# Patient Record
Sex: FEMALE | Race: WHITE | NOT HISPANIC OR LATINO | Employment: OTHER | URBAN - METROPOLITAN AREA
[De-identification: names, ages, dates, MRNs, and addresses within clinical notes are randomized per-mention and may not be internally consistent; named-entity substitution may affect disease eponyms.]

---

## 2017-06-20 ENCOUNTER — OFFICE VISIT - HEALTHEAST (OUTPATIENT)
Dept: FAMILY MEDICINE | Facility: CLINIC | Age: 67
End: 2017-06-20

## 2017-06-20 DIAGNOSIS — K21.9 GASTROESOPHAGEAL REFLUX DISEASE, ESOPHAGITIS PRESENCE NOT SPECIFIED: ICD-10-CM

## 2017-06-20 ASSESSMENT — MIFFLIN-ST. JEOR: SCORE: 1498.1

## 2017-07-10 ENCOUNTER — COMMUNICATION - HEALTHEAST (OUTPATIENT)
Dept: FAMILY MEDICINE | Facility: CLINIC | Age: 67
End: 2017-07-10

## 2017-08-20 ENCOUNTER — RECORDS - HEALTHEAST (OUTPATIENT)
Dept: ADMINISTRATIVE | Facility: OTHER | Age: 67
End: 2017-08-20

## 2017-09-07 ENCOUNTER — RECORDS - HEALTHEAST (OUTPATIENT)
Dept: ADMINISTRATIVE | Facility: OTHER | Age: 67
End: 2017-09-07

## 2017-10-11 ENCOUNTER — AMBULATORY - HEALTHEAST (OUTPATIENT)
Dept: NURSING | Facility: CLINIC | Age: 67
End: 2017-10-11

## 2017-10-11 ENCOUNTER — RECORDS - HEALTHEAST (OUTPATIENT)
Dept: ADMINISTRATIVE | Facility: OTHER | Age: 67
End: 2017-10-11

## 2017-10-11 DIAGNOSIS — Z23 IMMUNIZATION DUE: ICD-10-CM

## 2017-10-22 ENCOUNTER — COMMUNICATION - HEALTHEAST (OUTPATIENT)
Dept: FAMILY MEDICINE | Facility: CLINIC | Age: 67
End: 2017-10-22

## 2017-10-22 DIAGNOSIS — K21.9 GASTROESOPHAGEAL REFLUX DISEASE, ESOPHAGITIS PRESENCE NOT SPECIFIED: ICD-10-CM

## 2017-11-01 ENCOUNTER — OFFICE VISIT - HEALTHEAST (OUTPATIENT)
Dept: FAMILY MEDICINE | Facility: CLINIC | Age: 67
End: 2017-11-01

## 2017-11-01 DIAGNOSIS — J01.90 ACUTE SINUSITIS: ICD-10-CM

## 2017-11-01 DIAGNOSIS — H10.33 ACUTE CONJUNCTIVITIS, BILATERAL: ICD-10-CM

## 2017-11-20 ENCOUNTER — COMMUNICATION - HEALTHEAST (OUTPATIENT)
Dept: FAMILY MEDICINE | Facility: CLINIC | Age: 67
End: 2017-11-20

## 2017-11-20 ENCOUNTER — OFFICE VISIT - HEALTHEAST (OUTPATIENT)
Dept: FAMILY MEDICINE | Facility: CLINIC | Age: 67
End: 2017-11-20

## 2017-11-20 DIAGNOSIS — Z12.11 SCREEN FOR COLON CANCER: ICD-10-CM

## 2017-11-20 DIAGNOSIS — G47.30 SLEEP APNEA: ICD-10-CM

## 2017-11-20 DIAGNOSIS — J32.9 SINUSITIS: ICD-10-CM

## 2017-11-20 ASSESSMENT — MIFFLIN-ST. JEOR: SCORE: 1510.57

## 2017-12-21 ENCOUNTER — COMMUNICATION - HEALTHEAST (OUTPATIENT)
Dept: FAMILY MEDICINE | Facility: CLINIC | Age: 67
End: 2017-12-21

## 2017-12-21 DIAGNOSIS — K21.9 GASTROESOPHAGEAL REFLUX DISEASE, ESOPHAGITIS PRESENCE NOT SPECIFIED: ICD-10-CM

## 2018-01-11 ENCOUNTER — OFFICE VISIT - HEALTHEAST (OUTPATIENT)
Dept: SLEEP MEDICINE | Facility: CLINIC | Age: 68
End: 2018-01-11

## 2018-01-11 DIAGNOSIS — E66.9 OBESITY: ICD-10-CM

## 2018-01-11 DIAGNOSIS — R06.83 SNORING: ICD-10-CM

## 2018-01-11 DIAGNOSIS — G47.8 SLEEP DYSFUNCTION WITH SLEEP STAGE DISTURBANCE: ICD-10-CM

## 2018-01-11 DIAGNOSIS — G47.10 HYPERSOMNIA: ICD-10-CM

## 2018-01-11 ASSESSMENT — MIFFLIN-ST. JEOR: SCORE: 1503.83

## 2018-01-15 ENCOUNTER — RECORDS - HEALTHEAST (OUTPATIENT)
Dept: SLEEP MEDICINE | Facility: CLINIC | Age: 68
End: 2018-01-15

## 2018-01-15 ENCOUNTER — RECORDS - HEALTHEAST (OUTPATIENT)
Dept: ADMINISTRATIVE | Facility: OTHER | Age: 68
End: 2018-01-15

## 2018-01-15 DIAGNOSIS — R06.83 SNORING: ICD-10-CM

## 2018-01-15 DIAGNOSIS — G47.10 HYPERSOMNIA, UNSPECIFIED: ICD-10-CM

## 2018-01-15 DIAGNOSIS — E66.9 OBESITY, UNSPECIFIED: ICD-10-CM

## 2018-01-15 DIAGNOSIS — G47.8 OTHER SLEEP DISORDERS: ICD-10-CM

## 2018-01-16 ENCOUNTER — COMMUNICATION - HEALTHEAST (OUTPATIENT)
Dept: SLEEP MEDICINE | Facility: CLINIC | Age: 68
End: 2018-01-16

## 2018-01-16 DIAGNOSIS — G47.33 OBSTRUCTIVE SLEEP APNEA: ICD-10-CM

## 2018-01-16 DIAGNOSIS — G47.10 HYPERSOMNIA: ICD-10-CM

## 2018-01-17 ENCOUNTER — AMBULATORY - HEALTHEAST (OUTPATIENT)
Dept: SLEEP MEDICINE | Facility: CLINIC | Age: 68
End: 2018-01-17

## 2018-01-25 ENCOUNTER — AMBULATORY - HEALTHEAST (OUTPATIENT)
Dept: SLEEP MEDICINE | Facility: CLINIC | Age: 68
End: 2018-01-25

## 2018-03-26 ENCOUNTER — COMMUNICATION - HEALTHEAST (OUTPATIENT)
Dept: FAMILY MEDICINE | Facility: CLINIC | Age: 68
End: 2018-03-26

## 2018-03-26 DIAGNOSIS — K21.9 GASTROESOPHAGEAL REFLUX DISEASE, ESOPHAGITIS PRESENCE NOT SPECIFIED: ICD-10-CM

## 2018-04-09 ENCOUNTER — OFFICE VISIT - HEALTHEAST (OUTPATIENT)
Dept: SLEEP MEDICINE | Facility: CLINIC | Age: 68
End: 2018-04-09

## 2018-04-09 DIAGNOSIS — G47.33 OSA ON CPAP: ICD-10-CM

## 2018-04-09 DIAGNOSIS — G47.8 SLEEP DYSFUNCTION WITH SLEEP STAGE DISTURBANCE: ICD-10-CM

## 2018-04-09 ASSESSMENT — MIFFLIN-ST. JEOR: SCORE: 1500.2

## 2018-04-11 ENCOUNTER — DOCUMENTATION ONLY (OUTPATIENT)
Dept: SLEEP MEDICINE | Facility: CLINIC | Age: 68
End: 2018-04-11
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
## 2018-05-08 ENCOUNTER — COMMUNICATION - HEALTHEAST (OUTPATIENT)
Dept: FAMILY MEDICINE | Facility: CLINIC | Age: 68
End: 2018-05-08

## 2018-05-08 DIAGNOSIS — K21.9 GASTROESOPHAGEAL REFLUX DISEASE, ESOPHAGITIS PRESENCE NOT SPECIFIED: ICD-10-CM

## 2018-10-10 ENCOUNTER — AMBULATORY - HEALTHEAST (OUTPATIENT)
Dept: NURSING | Facility: CLINIC | Age: 68
End: 2018-10-10

## 2018-10-10 DIAGNOSIS — Z23 FLU VACCINE NEED: ICD-10-CM

## 2019-01-16 ENCOUNTER — COMMUNICATION - HEALTHEAST (OUTPATIENT)
Dept: FAMILY MEDICINE | Facility: CLINIC | Age: 69
End: 2019-01-16

## 2019-01-16 DIAGNOSIS — K21.9 GASTROESOPHAGEAL REFLUX DISEASE, ESOPHAGITIS PRESENCE NOT SPECIFIED: ICD-10-CM

## 2019-02-21 ENCOUNTER — OFFICE VISIT - HEALTHEAST (OUTPATIENT)
Dept: FAMILY MEDICINE | Facility: CLINIC | Age: 69
End: 2019-02-21

## 2019-02-21 DIAGNOSIS — M54.9 BACK PAIN: ICD-10-CM

## 2019-02-21 DIAGNOSIS — Z12.11 COLON CANCER SCREENING: ICD-10-CM

## 2019-02-21 DIAGNOSIS — Z12.31 VISIT FOR SCREENING MAMMOGRAM: ICD-10-CM

## 2019-02-21 LAB
ALBUMIN UR-MCNC: NEGATIVE MG/DL
APPEARANCE UR: CLEAR
BILIRUB UR QL STRIP: NEGATIVE
COLOR UR AUTO: YELLOW
GLUCOSE UR STRIP-MCNC: NEGATIVE MG/DL
HGB UR QL STRIP: NEGATIVE
KETONES UR STRIP-MCNC: NEGATIVE MG/DL
LEUKOCYTE ESTERASE UR QL STRIP: NEGATIVE
NITRATE UR QL: NEGATIVE
PH UR STRIP: 7 [PH] (ref 5–8)
SP GR UR STRIP: 1.02 (ref 1–1.03)
UROBILINOGEN UR STRIP-ACNC: NORMAL

## 2019-02-21 ASSESSMENT — MIFFLIN-ST. JEOR: SCORE: 1530.14

## 2019-02-22 ENCOUNTER — COMMUNICATION - HEALTHEAST (OUTPATIENT)
Dept: FAMILY MEDICINE | Facility: CLINIC | Age: 69
End: 2019-02-22

## 2019-02-22 DIAGNOSIS — K21.9 GASTROESOPHAGEAL REFLUX DISEASE, ESOPHAGITIS PRESENCE NOT SPECIFIED: ICD-10-CM

## 2019-03-07 ENCOUNTER — HOSPITAL ENCOUNTER (OUTPATIENT)
Dept: MAMMOGRAPHY | Facility: CLINIC | Age: 69
Discharge: HOME | End: 2019-03-07
Attending: FAMILY MEDICINE

## 2019-03-07 DIAGNOSIS — Z12.31 VISIT FOR SCREENING MAMMOGRAM: ICD-10-CM

## 2019-04-15 ENCOUNTER — COMMUNICATION - HEALTHEAST (OUTPATIENT)
Dept: FAMILY MEDICINE | Facility: CLINIC | Age: 69
End: 2019-04-15

## 2019-04-18 ENCOUNTER — OFFICE VISIT - HEALTHEAST (OUTPATIENT)
Dept: SLEEP MEDICINE | Facility: CLINIC | Age: 69
End: 2019-04-18

## 2019-04-18 DIAGNOSIS — G47.33 OBSTRUCTIVE SLEEP APNEA: ICD-10-CM

## 2019-04-18 DIAGNOSIS — G47.8 SLEEP DYSFUNCTION WITH SLEEP STAGE DISTURBANCE: ICD-10-CM

## 2019-04-18 ASSESSMENT — MIFFLIN-ST. JEOR: SCORE: 1548.73

## 2019-06-26 ENCOUNTER — OFFICE VISIT - HEALTHEAST (OUTPATIENT)
Dept: FAMILY MEDICINE | Facility: CLINIC | Age: 69
End: 2019-06-26

## 2019-06-26 DIAGNOSIS — M79.661 PAIN OF RIGHT LOWER LEG: ICD-10-CM

## 2019-06-26 ASSESSMENT — MIFFLIN-ST. JEOR: SCORE: 1556.22

## 2019-07-03 ENCOUNTER — COMMUNICATION - HEALTHEAST (OUTPATIENT)
Dept: FAMILY MEDICINE | Facility: CLINIC | Age: 69
End: 2019-07-03

## 2019-07-03 DIAGNOSIS — M54.50 CHRONIC RIGHT-SIDED LOW BACK PAIN WITHOUT SCIATICA: ICD-10-CM

## 2019-07-03 DIAGNOSIS — G89.29 CHRONIC RIGHT-SIDED LOW BACK PAIN WITHOUT SCIATICA: ICD-10-CM

## 2019-07-03 DIAGNOSIS — M79.661 PAIN OF RIGHT LOWER LEG: ICD-10-CM

## 2019-07-23 ENCOUNTER — HOSPITAL ENCOUNTER (OUTPATIENT)
Dept: PHYSICAL MEDICINE AND REHAB | Facility: CLINIC | Age: 69
Discharge: HOME | End: 2019-07-23
Attending: FAMILY MEDICINE

## 2019-07-23 DIAGNOSIS — M54.16 LUMBAR RADICULAR PAIN: ICD-10-CM

## 2019-07-23 DIAGNOSIS — F40.240 CLAUSTROPHOBIA: ICD-10-CM

## 2019-07-23 DIAGNOSIS — M47.816 LUMBAR FACET ARTHROPATHY: ICD-10-CM

## 2019-07-23 DIAGNOSIS — M43.16 SPONDYLOLISTHESIS OF LUMBAR REGION: ICD-10-CM

## 2019-07-30 ENCOUNTER — COMMUNICATION - HEALTHEAST (OUTPATIENT)
Dept: PHYSICAL MEDICINE AND REHAB | Facility: CLINIC | Age: 69
End: 2019-07-30

## 2019-07-31 ENCOUNTER — HOSPITAL ENCOUNTER (OUTPATIENT)
Dept: MRI IMAGING | Facility: HOSPITAL | Age: 69
Discharge: HOME | End: 2019-07-31
Attending: PHYSICIAN ASSISTANT

## 2019-07-31 DIAGNOSIS — M47.816 LUMBAR FACET ARTHROPATHY: ICD-10-CM

## 2019-07-31 DIAGNOSIS — M43.16 SPONDYLOLISTHESIS OF LUMBAR REGION: ICD-10-CM

## 2019-07-31 DIAGNOSIS — M54.16 LUMBAR RADICULAR PAIN: ICD-10-CM

## 2019-08-01 ENCOUNTER — COMMUNICATION - HEALTHEAST (OUTPATIENT)
Dept: PHYSICAL MEDICINE AND REHAB | Facility: CLINIC | Age: 69
End: 2019-08-01

## 2019-08-05 ENCOUNTER — OFFICE VISIT - HEALTHEAST (OUTPATIENT)
Dept: PHYSICAL THERAPY | Facility: REHABILITATION | Age: 69
End: 2019-08-05

## 2019-08-05 DIAGNOSIS — M54.16 RIGHT LUMBAR RADICULOPATHY: ICD-10-CM

## 2019-08-05 DIAGNOSIS — M62.81 GENERALIZED MUSCLE WEAKNESS: ICD-10-CM

## 2019-08-05 DIAGNOSIS — M43.16 SPONDYLOLISTHESIS OF LUMBAR REGION: ICD-10-CM

## 2019-08-14 ENCOUNTER — OFFICE VISIT - HEALTHEAST (OUTPATIENT)
Dept: PHYSICAL THERAPY | Facility: REHABILITATION | Age: 69
End: 2019-08-14

## 2019-08-14 DIAGNOSIS — M62.81 GENERALIZED MUSCLE WEAKNESS: ICD-10-CM

## 2019-08-14 DIAGNOSIS — M54.16 RIGHT LUMBAR RADICULOPATHY: ICD-10-CM

## 2019-08-14 DIAGNOSIS — M43.16 SPONDYLOLISTHESIS OF LUMBAR REGION: ICD-10-CM

## 2019-08-15 ENCOUNTER — COMMUNICATION - HEALTHEAST (OUTPATIENT)
Dept: PHYSICAL MEDICINE AND REHAB | Facility: CLINIC | Age: 69
End: 2019-08-15

## 2019-08-15 DIAGNOSIS — M54.16 LUMBAR RADICULAR PAIN: ICD-10-CM

## 2019-08-21 ENCOUNTER — OFFICE VISIT - HEALTHEAST (OUTPATIENT)
Dept: PHYSICAL THERAPY | Facility: REHABILITATION | Age: 69
End: 2019-08-21

## 2019-08-21 DIAGNOSIS — M54.16 RIGHT LUMBAR RADICULOPATHY: ICD-10-CM

## 2019-08-21 DIAGNOSIS — M62.81 GENERALIZED MUSCLE WEAKNESS: ICD-10-CM

## 2019-08-21 DIAGNOSIS — M43.16 SPONDYLOLISTHESIS OF LUMBAR REGION: ICD-10-CM

## 2019-09-04 ENCOUNTER — OFFICE VISIT - HEALTHEAST (OUTPATIENT)
Dept: PHYSICAL THERAPY | Facility: REHABILITATION | Age: 69
End: 2019-09-04

## 2019-09-04 DIAGNOSIS — M62.81 GENERALIZED MUSCLE WEAKNESS: ICD-10-CM

## 2019-09-04 DIAGNOSIS — M54.16 RIGHT LUMBAR RADICULOPATHY: ICD-10-CM

## 2019-09-04 DIAGNOSIS — M43.16 SPONDYLOLISTHESIS OF LUMBAR REGION: ICD-10-CM

## 2019-09-09 ENCOUNTER — HOSPITAL ENCOUNTER (OUTPATIENT)
Dept: PHYSICAL MEDICINE AND REHAB | Facility: CLINIC | Age: 69
Discharge: HOME | End: 2019-09-09
Attending: PHYSICIAN ASSISTANT

## 2019-09-09 DIAGNOSIS — M54.16 LUMBAR RADICULAR PAIN: ICD-10-CM

## 2019-09-09 DIAGNOSIS — M47.816 LUMBAR FACET ARTHROPATHY: ICD-10-CM

## 2019-09-09 DIAGNOSIS — M43.16 SPONDYLOLISTHESIS OF LUMBAR REGION: ICD-10-CM

## 2019-09-09 DIAGNOSIS — M16.12 PRIMARY OSTEOARTHRITIS OF LEFT HIP: ICD-10-CM

## 2019-10-02 ENCOUNTER — RECORDS - HEALTHEAST (OUTPATIENT)
Dept: ADMINISTRATIVE | Facility: OTHER | Age: 69
End: 2019-10-02

## 2019-10-03 ENCOUNTER — AMBULATORY - HEALTHEAST (OUTPATIENT)
Dept: NURSING | Facility: CLINIC | Age: 69
End: 2019-10-03

## 2019-10-03 DIAGNOSIS — Z23 FLU VACCINE NEED: ICD-10-CM

## 2019-10-16 ENCOUNTER — COMMUNICATION - HEALTHEAST (OUTPATIENT)
Dept: PHYSICAL MEDICINE AND REHAB | Facility: CLINIC | Age: 69
End: 2019-10-16

## 2019-10-17 ENCOUNTER — HOSPITAL ENCOUNTER (OUTPATIENT)
Dept: PHYSICAL MEDICINE AND REHAB | Facility: CLINIC | Age: 69
Discharge: HOME | End: 2019-10-17
Attending: PHYSICIAN ASSISTANT

## 2019-10-17 DIAGNOSIS — M25.552 HIP PAIN, LEFT: ICD-10-CM

## 2019-10-17 DIAGNOSIS — M43.16 SPONDYLOLISTHESIS OF LUMBAR REGION: ICD-10-CM

## 2019-10-17 DIAGNOSIS — M54.16 LUMBAR RADICULAR PAIN: ICD-10-CM

## 2019-10-17 ASSESSMENT — MIFFLIN-ST. JEOR: SCORE: 1550.55

## 2019-10-21 ENCOUNTER — COMMUNICATION - HEALTHEAST (OUTPATIENT)
Dept: PHYSICAL MEDICINE AND REHAB | Facility: CLINIC | Age: 69
End: 2019-10-21

## 2019-10-21 ENCOUNTER — RECORDS - HEALTHEAST (OUTPATIENT)
Dept: GENERAL RADIOLOGY | Facility: CLINIC | Age: 69
End: 2019-10-21

## 2019-10-21 DIAGNOSIS — M25.552 PAIN IN LEFT HIP: ICD-10-CM

## 2019-10-31 ENCOUNTER — HOSPITAL ENCOUNTER (OUTPATIENT)
Dept: PHYSICAL MEDICINE AND REHAB | Facility: CLINIC | Age: 69
Discharge: HOME | End: 2019-10-31
Attending: PHYSICIAN ASSISTANT

## 2019-10-31 DIAGNOSIS — M16.12 PRIMARY OSTEOARTHRITIS OF LEFT HIP: ICD-10-CM

## 2019-10-31 ASSESSMENT — MIFFLIN-ST. JEOR: SCORE: 1550.55

## 2019-11-01 ENCOUNTER — OFFICE VISIT - HEALTHEAST (OUTPATIENT)
Dept: PHYSICAL THERAPY | Facility: REHABILITATION | Age: 69
End: 2019-11-01

## 2019-11-01 DIAGNOSIS — M62.81 MUSCLE WEAKNESS (GENERALIZED): ICD-10-CM

## 2019-11-01 DIAGNOSIS — M25.552 ACUTE HIP PAIN, LEFT: ICD-10-CM

## 2019-11-05 ENCOUNTER — OFFICE VISIT - HEALTHEAST (OUTPATIENT)
Dept: PHYSICAL THERAPY | Facility: REHABILITATION | Age: 69
End: 2019-11-05

## 2019-11-05 DIAGNOSIS — M25.552 ACUTE HIP PAIN, LEFT: ICD-10-CM

## 2019-11-05 DIAGNOSIS — M62.81 MUSCLE WEAKNESS (GENERALIZED): ICD-10-CM

## 2019-11-11 ENCOUNTER — OFFICE VISIT - HEALTHEAST (OUTPATIENT)
Dept: FAMILY MEDICINE | Facility: CLINIC | Age: 69
End: 2019-11-11

## 2019-11-11 DIAGNOSIS — K21.9 GASTROESOPHAGEAL REFLUX DISEASE WITHOUT ESOPHAGITIS: ICD-10-CM

## 2019-11-11 DIAGNOSIS — Z23 IMMUNIZATION DUE: ICD-10-CM

## 2019-11-11 DIAGNOSIS — Z01.818 PREOPERATIVE EXAMINATION: ICD-10-CM

## 2019-11-11 DIAGNOSIS — I10 BENIGN ESSENTIAL HYPERTENSION: ICD-10-CM

## 2019-11-11 DIAGNOSIS — H25.9 AGE-RELATED CATARACT OF BOTH EYES, UNSPECIFIED AGE-RELATED CATARACT TYPE: ICD-10-CM

## 2019-11-11 LAB
ANION GAP SERPL CALCULATED.3IONS-SCNC: 10 MMOL/L (ref 5–18)
BUN SERPL-MCNC: 12 MG/DL (ref 8–22)
CALCIUM SERPL-MCNC: 8.7 MG/DL (ref 8.5–10.5)
CHLORIDE BLD-SCNC: 105 MMOL/L (ref 98–107)
CO2 SERPL-SCNC: 28 MMOL/L (ref 22–31)
CREAT SERPL-MCNC: 0.81 MG/DL (ref 0.6–1.1)
GFR SERPL CREATININE-BSD FRML MDRD: >60 ML/MIN/1.73M2
GLUCOSE BLD-MCNC: 120 MG/DL (ref 70–125)
POTASSIUM BLD-SCNC: 4.1 MMOL/L (ref 3.5–5)
SODIUM SERPL-SCNC: 143 MMOL/L (ref 136–145)

## 2019-11-12 ENCOUNTER — OFFICE VISIT - HEALTHEAST (OUTPATIENT)
Dept: PHYSICAL THERAPY | Facility: REHABILITATION | Age: 69
End: 2019-11-12

## 2019-11-12 DIAGNOSIS — M25.552 ACUTE HIP PAIN, LEFT: ICD-10-CM

## 2019-11-12 DIAGNOSIS — M62.81 MUSCLE WEAKNESS (GENERALIZED): ICD-10-CM

## 2019-11-14 ENCOUNTER — OFFICE VISIT - HEALTHEAST (OUTPATIENT)
Dept: PHYSICAL THERAPY | Facility: REHABILITATION | Age: 69
End: 2019-11-14

## 2019-11-14 DIAGNOSIS — M25.552 ACUTE HIP PAIN, LEFT: ICD-10-CM

## 2019-11-14 DIAGNOSIS — M62.81 MUSCLE WEAKNESS (GENERALIZED): ICD-10-CM

## 2019-12-19 ENCOUNTER — RECORDS - HEALTHEAST (OUTPATIENT)
Dept: ADMINISTRATIVE | Facility: OTHER | Age: 69
End: 2019-12-19

## 2020-01-02 ENCOUNTER — RECORDS - HEALTHEAST (OUTPATIENT)
Dept: ADMINISTRATIVE | Facility: OTHER | Age: 70
End: 2020-01-02

## 2020-01-29 ENCOUNTER — COMMUNICATION - HEALTHEAST (OUTPATIENT)
Dept: FAMILY MEDICINE | Facility: CLINIC | Age: 70
End: 2020-01-29

## 2020-03-06 ENCOUNTER — OFFICE VISIT - HEALTHEAST (OUTPATIENT)
Dept: FAMILY MEDICINE | Facility: CLINIC | Age: 70
End: 2020-03-06

## 2020-03-06 DIAGNOSIS — H01.001 BLEPHARITIS OF UPPER EYELIDS OF BOTH EYES, UNSPECIFIED TYPE: ICD-10-CM

## 2020-03-06 DIAGNOSIS — Z01.818 PREOPERATIVE EXAMINATION: ICD-10-CM

## 2020-03-06 DIAGNOSIS — H01.004 BLEPHARITIS OF UPPER EYELIDS OF BOTH EYES, UNSPECIFIED TYPE: ICD-10-CM

## 2020-03-06 ASSESSMENT — MIFFLIN-ST. JEOR: SCORE: 1532.4

## 2020-03-10 ENCOUNTER — COMMUNICATION - HEALTHEAST (OUTPATIENT)
Dept: FAMILY MEDICINE | Facility: CLINIC | Age: 70
End: 2020-03-10

## 2020-03-10 DIAGNOSIS — I50.9 CONGESTIVE HEART FAILURE, UNSPECIFIED HF CHRONICITY, UNSPECIFIED HEART FAILURE TYPE (H): ICD-10-CM

## 2020-06-25 ENCOUNTER — RECORDS - HEALTHEAST (OUTPATIENT)
Dept: ADMINISTRATIVE | Facility: OTHER | Age: 70
End: 2020-06-25

## 2020-09-01 ENCOUNTER — COMMUNICATION - HEALTHEAST (OUTPATIENT)
Dept: FAMILY MEDICINE | Facility: CLINIC | Age: 70
End: 2020-09-01

## 2020-09-01 DIAGNOSIS — I50.9 CONGESTIVE HEART FAILURE, UNSPECIFIED HF CHRONICITY, UNSPECIFIED HEART FAILURE TYPE (H): ICD-10-CM

## 2020-09-02 ENCOUNTER — RECORDS - HEALTHEAST (OUTPATIENT)
Dept: ADMINISTRATIVE | Facility: OTHER | Age: 70
End: 2020-09-02

## 2021-02-26 ENCOUNTER — COMMUNICATION - HEALTHEAST (OUTPATIENT)
Dept: FAMILY MEDICINE | Facility: CLINIC | Age: 71
End: 2021-02-26

## 2021-02-26 DIAGNOSIS — I50.9 CONGESTIVE HEART FAILURE, UNSPECIFIED HF CHRONICITY, UNSPECIFIED HEART FAILURE TYPE (H): ICD-10-CM

## 2021-04-21 ENCOUNTER — RECORDS - HEALTHEAST (OUTPATIENT)
Dept: ADMINISTRATIVE | Facility: OTHER | Age: 71
End: 2021-04-21

## 2021-04-21 ENCOUNTER — TRANSFERRED RECORDS (OUTPATIENT)
Dept: HEALTH INFORMATION MANAGEMENT | Facility: CLINIC | Age: 71
End: 2021-04-21

## 2021-04-30 ENCOUNTER — OFFICE VISIT (OUTPATIENT)
Dept: FAMILY MEDICINE | Facility: CLINIC | Age: 71
End: 2021-04-30
Payer: COMMERCIAL

## 2021-04-30 VITALS
HEIGHT: 63 IN | TEMPERATURE: 96.5 F | SYSTOLIC BLOOD PRESSURE: 110 MMHG | DIASTOLIC BLOOD PRESSURE: 68 MMHG | RESPIRATION RATE: 12 BRPM | HEART RATE: 68 BPM | BODY MASS INDEX: 38.85 KG/M2 | WEIGHT: 219.25 LBS

## 2021-04-30 DIAGNOSIS — E66.01 MORBID OBESITY (H): ICD-10-CM

## 2021-04-30 DIAGNOSIS — I42.9 CARDIOMYOPATHY, UNSPECIFIED TYPE (H): ICD-10-CM

## 2021-04-30 DIAGNOSIS — Z12.31 VISIT FOR SCREENING MAMMOGRAM: ICD-10-CM

## 2021-04-30 DIAGNOSIS — Z13.1 SCREENING FOR DIABETES MELLITUS: ICD-10-CM

## 2021-04-30 DIAGNOSIS — Z00.00 HEALTHCARE MAINTENANCE: Primary | ICD-10-CM

## 2021-04-30 DIAGNOSIS — E78.5 HYPERLIPIDEMIA, UNSPECIFIED HYPERLIPIDEMIA TYPE: ICD-10-CM

## 2021-04-30 LAB
ANION GAP SERPL CALCULATED.3IONS-SCNC: 4 MMOL/L (ref 3–14)
BUN SERPL-MCNC: 17 MG/DL (ref 7–30)
CALCIUM SERPL-MCNC: 8.7 MG/DL (ref 8.5–10.1)
CHLORIDE SERPL-SCNC: 106 MMOL/L (ref 94–109)
CHOLEST SERPL-MCNC: 233 MG/DL
CO2 SERPL-SCNC: 30 MMOL/L (ref 20–32)
CREAT SERPL-MCNC: 0.87 MG/DL (ref 0.52–1.04)
GFR SERPL CREATININE-BSD FRML MDRD: 67 ML/MIN/{1.73_M2}
GLUCOSE SERPL-MCNC: 94 MG/DL (ref 70–99)
HBA1C MFR BLD: 5.8 % (ref 0–5.6)
HDLC SERPL-MCNC: 64 MG/DL
LDLC SERPL CALC-MCNC: 142 MG/DL
NONHDLC SERPL-MCNC: 169 MG/DL
POTASSIUM SERPL-SCNC: 4.4 MMOL/L (ref 3.4–5.3)
SODIUM SERPL-SCNC: 140 MMOL/L (ref 133–144)
TRIGL SERPL-MCNC: 137 MG/DL

## 2021-04-30 PROCEDURE — 83036 HEMOGLOBIN GLYCOSYLATED A1C: CPT | Performed by: FAMILY MEDICINE

## 2021-04-30 PROCEDURE — 99214 OFFICE O/P EST MOD 30 MIN: CPT | Performed by: FAMILY MEDICINE

## 2021-04-30 PROCEDURE — 80061 LIPID PANEL: CPT | Performed by: FAMILY MEDICINE

## 2021-04-30 PROCEDURE — 80048 BASIC METABOLIC PNL TOTAL CA: CPT | Performed by: FAMILY MEDICINE

## 2021-04-30 PROCEDURE — 36415 COLL VENOUS BLD VENIPUNCTURE: CPT | Performed by: FAMILY MEDICINE

## 2021-04-30 RX ORDER — LISINOPRIL 5 MG/1
5 TABLET ORAL DAILY
Qty: 90 TABLET | Refills: 3 | Status: SHIPPED | OUTPATIENT
Start: 2021-04-30 | End: 2022-05-02

## 2021-04-30 RX ORDER — CARVEDILOL 3.12 MG/1
TABLET ORAL
Qty: 90 TABLET | Refills: 3 | Status: SHIPPED | OUTPATIENT
Start: 2021-04-30 | End: 2022-05-24

## 2021-04-30 RX ORDER — LISINOPRIL 5 MG/1
5 TABLET ORAL DAILY
COMMUNITY
Start: 2021-02-04 | End: 2021-04-30

## 2021-04-30 RX ORDER — CARVEDILOL 3.12 MG/1
TABLET ORAL
COMMUNITY
Start: 2021-02-26 | End: 2021-04-30

## 2021-04-30 ASSESSMENT — MIFFLIN-ST. JEOR: SCORE: 1483.64

## 2021-04-30 NOTE — PROGRESS NOTES
Assessment/Plan:    Heather Blakely is a 71 year old female presenting for:    Healthcare maintenance    Visit for screening mammogram  - *MA Screening Digital Bilateral    Cardiomyopathy, unspecified type (H)  Continue to follow with cardiology.  Will order labs here today.  Recent echocardiogram is stable.  - carvedilol (COREG) 3.125 MG tablet  Dispense: 90 tablet; Refill: 3  - lisinopril (ZESTRIL) 5 MG tablet  Dispense: 90 tablet; Refill: 3  - Basic metabolic panel    Screening for diabetes mellitus  - HEMOGLOBIN A1C    Hyperlipidemia, unspecified hyperlipidemia type  - Lipid panel reflex to direct LDL Fasting    Morbid obesity (H)  Discussed dietary and exercise.  Excited for the nicer weather.      Medications Discontinued During This Encounter   Medication Reason     LISINOPRIL PO      CARVEDILOL PO      SIMVASTATIN PO      lisinopril (ZESTRIL) 5 MG tablet Reorder     carvedilol (COREG) 3.125 MG tablet Reorder           Chief Complaint:  Recheck Medication, Refill Request, and Labs Only        Subjective:   Heather Blakely is a pleasant 71-year-old female with a past medical history significant for cardiomyopathy who follows with cardiology presenting to the clinic today for medication check.    Patient is currently on carvedilol 3.125 mg daily as well as lisinopril 5 mg daily.  She is doing well with the medications.  She had a phone visit with her cardiologist last May and an echocardiogram this past September which showed stability.    Otherwise, she is hopeful to get some fasting labs today.    She believes that she will need to have a left hip replacement at some point but thinks that this might not be until the fall.    She has had 2 doses of her COVID-19 vaccine    12 point review of systems completed and negative except for what has been described above    History   Smoking Status     Never Smoker   Smokeless Tobacco     Never Used         Current Outpatient Medications:      carvedilol (COREG) 3.125  "MG tablet, TAKE 1 TABLET (3.125 MG TOTAL) BY MOUTH DAILY., Disp: 90 tablet, Rfl: 3     Cholecalciferol (VITAMIN D3 PO), Take 2,000 Units by mouth daily, Disp: , Rfl:      lisinopril (ZESTRIL) 5 MG tablet, Take 1 tablet (5 mg) by mouth daily, Disp: 90 tablet, Rfl: 3      Objective:  Vitals:    04/30/21 0822   BP: 110/68   Pulse: 68   Resp: 12   Temp: 96.5  F (35.8  C)   TempSrc: Tympanic   Weight: 99.5 kg (219 lb 4 oz)   Height: 1.6 m (5' 3\")       Body mass index is 38.84 kg/m .    Vital signs reviewed and stable  General: No acute distress  Psych: Appropriate affect  HEENT: moist mucous membranes, pupils equal, round, reactive to light and accomodation, tympanic membranes are pearly grey bilaterally  Lymph: no cervical or supraclavicular lymphadenopathy  Cardiovascular: regular rate and rhythm with no murmur  Pulmonary: clear to auscultation bilaterally with no wheeze  Abdomen: soft, non tender, non distended with normo-active bowel sounds  Extremities: warm and well perfused with no edema  Skin: warm and dry with no rash         This note has been dictated and transcribed using voice recognition software.   Any errors in transcription are unintentional and inherent to the software.  "

## 2021-04-30 NOTE — PATIENT INSTRUCTIONS
I have ordered outside imaging for you.  You can call [phone] to get this scheduled.  This is the main scheduling number and they will be able to help you schedule anywhere within the [facility] system.

## 2021-05-03 ENCOUNTER — TELEPHONE (OUTPATIENT)
Dept: FAMILY MEDICINE | Facility: CLINIC | Age: 71
End: 2021-05-03

## 2021-05-03 NOTE — TELEPHONE ENCOUNTER
Can you please call over to the cologuard people and see if you can get her records for her Negative cologuard in early 2019 faxed?  Thanks!    EB

## 2021-05-03 NOTE — TELEPHONE ENCOUNTER
----- Message from Kristin Samayoa sent at 5/3/2021  9:29 AM CDT -----  Kwabena Butler    Thank you for your message below. We are unable to abstract Cologuard as patient reported. If we can get a copy of the paper report scanned into Epic, or get the test pulled on Care Everywhere which I am not finding right now, then we can abstract it for you. Please let us know if you have any questions about this.  Thank you,  Kristin ALEMAN, Abstracting Team     ----- Message -----  From: Carrie Delgado MD  Sent: 4/30/2021   8:42 AM CDT  To: Abstract Quality Initiatives    Please abstract the following tests found in Care Everywhere:    Cologuard - 3/18/19    Thank you,  Dr Delgado

## 2021-05-27 ENCOUNTER — RECORDS - HEALTHEAST (OUTPATIENT)
Dept: ADMINISTRATIVE | Facility: CLINIC | Age: 71
End: 2021-05-27

## 2021-05-27 NOTE — PROGRESS NOTES
"Dear Dr. Delgado, Carrie Beauchamp [place]  [id number]  [address] [address],    Thank you for the opportunity to participate in the care of Heather Blakely.     She is a 69 y.o. y/o female patient who comes to the sleep medicine clinic for follow up.  The patient states that since the last clinical visit with me she was able to increase her hours of usage with her CPAP more.  As a result her sleep quality and energy level have improved.  She is having some dry mouth use associated with CPAP.    Compliance Download data for 30 days:  Pressure setting: APAP 5.6-14 CWP  Residual AHI: 0.1 events per hour  Leak: Minimal  Compliance: 77%  Mask Tolerance: Good  Skin irritation: None    No past medical history on file.    Past Surgical History:   Procedure Laterality Date     [place] ARTHROPLASTY TIBIAL PLATEAU      Description: Knee Replacement;  Recorded: 06/25/2013;  Comments: 1969 = \"reconstruction\" per pt     [place] LIGATE FALLOPIAN TUBE      Description: Tubal Ligation;  Recorded: 06/25/2013;     [place] REMOVAL GALLBLADDER      Description: Cholecystectomy;  Proc Date: 01/01/2000;       Social History     Socioeconomic History     Marital status:      Spouse name: Not on file     Number of children: Not on file     Years of education: Not on file     Highest education level: Not on file   Occupational History     Not on file   Social Needs     Financial resource strain: Not on file     Food insecurity:     Worry: Not on file     Inability: Not on file     Transportation needs:     Medical: Not on file     Non-medical: Not on file   Tobacco Use     Smoking status: Former Smoker     Smokeless tobacco: Never Used     Tobacco comment: Quit in 1975   Substance and Sexual Activity     Alcohol use: No     Drug use: No     Sexual activity: Not on file   Lifestyle     Physical activity:     Days per week: Not on file     Minutes per session: Not on file     Stress: Not on file   Relationships     Social connections:     Talks " "on phone: Not on file     Gets together: Not on file     Attends [employer] service: Not on file     Active member of club or organization: Not on file     Attends meetings of clubs or organizations: Not on file     Relationship status: Not on file     Intimate partner violence:     Fear of current or ex partner: Not on file     Emotionally abused: Not on file     Physically abused: Not on file     Forced sexual activity: Not on file   Other Topics Concern     Not on file   Social History Narrative     Not on file       Current Outpatient Medications   Medication Sig Dispense Refill     carvedilol (COREG) 3.125 MG tablet Take 3.125 mg by mouth daily.        cholecalciferol, vitamin D3, 2,000 unit Tab Take 4,000 Units by mouth.       lisinopril (PRINIVIL,ZESTRIL) 2.5 MG tablet Take 2.5 mg by mouth daily.       ranitidine (ZANTAC) 150 MG tablet Take 1 tablet (150 mg total) by mouth 2 (two) times a day. 60 tablet 11     No current facility-administered medications for this visit.        Allergies   Allergen Reactions     Sulfa (Sulfonamide Antibiotics)      Since a child       Epworths Sleepiness Scale 1/11/2018 4/9/2018 4/18/2019   Sitting and reading 1 1 2   Watching TV 2 1 2   Sitting, inactive in a public place (e.g. a theatre or a meeting) 2 0 1   As a passenger in a car for an hour without a break 0 1 0   Lying down to rest in the afternoon when circumstances permit 3 3 3   Sitting and talking to someone - 0 0   Sitting quietly after a lunch without alcohol 2 1 1   In a car, while stopped for a few minutes in traffic 0 0 0   Total score - 7 9       Physical Exam:  /70   Pulse 88   Ht 5' 3\" (1.6 m)   Wt (!) 233 lb 9.6 oz (106 kg)   SpO2 97%   BMI 41.38 kg/m    BMI:Body mass index is 41.38 kg/m .   GEN: NAD, obese  Psych: normal mood, normal affect    Labs/Studies:     I reviewed the efficacy and compliance report from his device. Data summarized on the HPI and the CPAP compliance flow sheet.     Lab " Results   Component Value Date    WBC 6.9 05/10/2015    HGB 13.7 05/10/2015    HCT 42.1 05/10/2015    MCV 86 05/10/2015     05/10/2015         Chemistry        Component Value Date/Time     05/10/2015 0915    K 4.4 05/10/2015 0915     05/10/2015 0915    CO2 24 05/10/2015 0915    BUN 13 05/10/2015 0915    CREATININE 0.83 05/10/2015 0915    GLU 94 05/10/2015 0915        Component Value Date/Time    CALCIUM 9.5 05/10/2015 0915    ALKPHOS 67 05/10/2015 0915    AST 17 05/10/2015 0915    ALT 18 05/10/2015 0915    BILITOT 0.4 05/10/2015 0915            No results found for: FERRITIN         Assessment and Plan:  In summary Heather Blakely is a 69 y.o. year old female who is here for follow-up.    1.  Obstructive sleep apnea on CPAP  I congratulated patient on her excellent CPAP usage.  I will keep her on the same pressure settings as per her request.  I will also will direct her to follow-up with our durable medical  during this clinic visit to get supplies including a chinstrap.  2.  Other sleep disturbance     Patient verbalized understanding of these issues, agrees with the plan and all questions were answered today. Patient was given an opportuntity to voice any other symptoms or concerns not listed above. Patient did not have any other symptoms or concerns.      Osmel Benitez DO  Board Certified in Internal Medicine and Sleep Medicine  [facility].    (Note created with Dragon voice recognition and unintended spelling errors and word substitutions may occur)

## 2021-05-28 ENCOUNTER — RECORDS - HEALTHEAST (OUTPATIENT)
Dept: ADMINISTRATIVE | Facility: CLINIC | Age: 71
End: 2021-05-28

## 2021-05-30 ENCOUNTER — RECORDS - HEALTHEAST (OUTPATIENT)
Dept: ADMINISTRATIVE | Facility: CLINIC | Age: 71
End: 2021-05-30

## 2021-05-30 NOTE — TELEPHONE ENCOUNTER
Call placed to pt to discuss. Pt states she feels drowsy and dizzy when taking the medication. She is currently up to 1-0-1. Advised her to take 0-0-1 tomorrow and then stop. She verbalized understanding. She will call if she has any questions or concerns.

## 2021-05-30 NOTE — PROGRESS NOTES
ASSESSMENT: Heather Blakely is a 69 y.o. female with past medical history significant for vitamin D deficiency, hyperlipidemia, congestive heart failure who presents today for new patient evaluation of chronic low back pain with a 6-month history of pain radiating into the right lower extremity.  Patient has not had any recent advanced imaging of the lumbar spine.  CT of abdomen and pelvis from 2014 showed mild degenerative anterolisthesis L4-5 as well as severe disc degeneration L5-S1.  Leg symptoms do follow an L4/L5 distribution.  I am concerned that she may have right L4 and/or L5 neural impingement.  On exam, patient demonstrated a diminished right patellar reflex compared with the left.  Strength and sensation remain intact.  -Patient also has chronic left hip pain.  Patient states that she has been seen by orthopedics regarding her left hip pain and they told her she has osteoarthritis and may need a hip replacement surgery in the future.  She would like to focus on her right-sided back and leg pain today.  I do not evaluate the left hip further.    DAVID: [id number]  Who 5:25    PLAN:  A shared decision making model was used.  The patient's values and choices were respected.  The following represents what was discussed and decided upon by the physician assistant and the patient.      1.  DIAGNOSTIC TESTS: I ordered an MRI lumbar spine for further evaluation.    2.  PHYSICAL THERAPY:  Discussed the importance of core strengthening, ROM, stretching exercises with the patient and how each of these entities is important in decreasing pain.  Explained to the patient that the purpose of physical therapy is to teach the patient a home exercise program.  These exercises need to be performed every day in order to decrease pain and prevent future occurrences of pain.  Entered a referral for the patient begin physical therapy.    3.  MEDICATIONS:   -Gabapentin 300 mg as prescribed.  She was given the dosage titration chart.   "She may increase her dose up to 300 mg 3 times daily.  -Patient can continue using Tylenol as needed.  -Patient completed a 5-day course of prednisone which provided 100% relief of her pain almost instantly but pain returned within 24 hours of stopping the prednisone.    4.  INTERVENTIONS:  No interventions were ordered.  Patient may benefit from interventional pain management if she fails to improve with conservative treatment, depending on the results of her MRI.    5.  PATIENT EDUCATION: Patient is in agreement the above plan.  All questions were answered.    6.  FOLLOW-UP:   A nurse to call the patient with the results of her MRI lumbar spine.  At that time I will likely recommend that the patient continue with physical therapy and follow-up with me in 4 weeks.  If she has any questions or concerns in the meantime, she should not hesitate to call.      SUBJECTIVE:  Heather Blakely  Is a 69 y.o. female who presents today in consultation at the request of Dr. Delgado for new patient evaluation of chronic low back pain with a 6-month history of pain in the right lower extremity.  Patient denies any injury or event to cause the pain.  She is referred to our clinic for further evaluation and treatment.    Patient complains of chronic right low back pain.  Patient states this is been present for years.  Pain is located in the right lumbar region and extends into the right buttock.  This area of pain is aggravated with walking and alleviated with rest and sitting in a massage chair.  She denies any significant pain in the thigh.  However, she complains of pain which can be severe which begins in the right medial calf just distal to the knee.  The pain extends down the medial aspect of the calf, into the dorsal foot, and ends in the right second toe.  She states that she had a couple episodes of \"sciatica\" in the past, but she does not recall the pain being in this area and it has never been this intense.  She describes " the lower leg pain as an aching, throbbing pain.  It is worse at night.  Patient reports that she wakes up at 2 in the morning because of the pain.  Most nights she cannot get back to sleep after this.  The leg pain is also aggravated with prolonged standing.  She notices this when she is ironing and standing at her quilting table.  She denies any severe pain with sitting, unless she is sitting in her recliner and her cat walks on her legs.  She states that the pain is alleviated with taking Tylenol.  She denies any numbness, tingling, or weakness in the leg.  She denies any loss of bowel or bladder control.  Denies any recent fevers, chills, sweats.  Denies any swelling in the leg.  Denies color changes in the leg.  She states that she has chronic left hip pain for which she has seen orthopedics.  They told her she has osteoarthritis and may need a hip replacement surgery and future.  Otherwise, she denies any left-sided pain.    Patient has not had any recent treatments for her lower back.  She has not had any recent physical therapy or chiropractic treatment.  She has never had spine surgery or spine injections.  Patient completed a 5-day course of prednisone last month.  Patient reports that she had almost instant relief of her pain with the prednisone.  However, the pain returned within 24 hours of her last dose of prednisone.  She has been using Tylenol 1000 mg daily.    Current Outpatient Medications on File Prior to Visit   Medication Sig Dispense Refill     carvedilol (COREG) 3.125 MG tablet Take 3.125 mg by mouth daily.        cholecalciferol, vitamin D3, 2,000 unit Tab Take 4,000 Units by mouth.       lisinopril (PRINIVIL,ZESTRIL) 2.5 MG tablet Take 2.5 mg by mouth daily.       ranitidine (ZANTAC) 150 MG tablet Take 1 tablet (150 mg total) by mouth 2 (two) times a day. 60 tablet 11       Allergies   Allergen Reactions     Sulfa (Sulfonamide Antibiotics)      Since a child       Patient Active Problem List  "  Diagnosis     Chest Tightness Or Heavy Pressure     Vitamin D Deficiency     Hyperlipidemia     Left Bundle Branch Block     Congestive Heart Failure     Esophageal Reflux     Diffuse Joint Pains (Arthralgias)     Fatigue         Past Surgical History:   Procedure Laterality Date     [place] ARTHROPLASTY TIBIAL PLATEAU      Description: Knee Replacement;  Recorded: 06/25/2013;  Comments: 1969 = \"reconstruction\" per pt     [place] LIGATE FALLOPIAN TUBE      Description: Tubal Ligation;  Recorded: 06/25/2013;     [place] REMOVAL GALLBLADDER      Description: Cholecystectomy;  Proc Date: 01/01/2000;       Family History   Problem Relation Age of Onset     Sleep apnea Child      Heart disease Mother      Hypertension Mother      Diabetes Father      Stroke Maternal Grandmother      Breast cancer Neg Hx        Social history: Patient is .  She is retired.  She works as a /.  She denies tobacco use.  She drinks alcohol occasionally.  She denies illicit drug use.    ROS: Positive for reflux, joint pain, sciatica.  Specifically negative for bowel/bladder dysfunction, fevers,chills, appetite changes, unexplained weight loss.   Otherwise 13 systems reviewed are negative.  Please see the patient's intake questionnaire from today for details.      OBJECTIVE:  PHYSICAL EXAMINATION:    CONSTITUTIONAL:  Vital signs as above.  No acute distress.  The patient is well nourished and well groomed.  PSYCHIATRIC:  The patient is awake, alert, oriented to person, place, time and answering questions appropriately with clear speech.    HEENT: Normocephalic, atraumatic.  Sclera clear.  Neck is supple.  SKIN:  Skin over the face, bilateral lower extremities, and posterior torso is clean, dry, intact without rashes.    GAIT:  Gait is non-antalgic.  The patient is able to heel and toe walk without significant difficulty.    STANDING EXAMINATION: Tender to palpation right lower lumbar paraspinous muscles at L4-5 and " L5-S1.  Tender to palpation right sacroiliac joint.  Lumbar flexion intact.  Lumbar extension mildly restricted with reproduction of right low back pain.  Lumbar facet loading maneuvers reproduce low back pain on the right.  MUSCLE STRENGTH:  The patient has 5/5 strength for the bilateral hip flexors, knee flexors/extensors, ankle dorsiflexors/plantar flexors, great toe extensors, ankle evertors/invertors.  NEUROLOGICAL: Reflexes are 2+ left and 1+ right patellar, 2+ bilaterally Achilles.  Negative Babinski's bilaterally.  No ankle clonus bilaterally. Sensation to light touch is intact in the bilateral L4, L5, and S1 dermatomes.  VASCULAR:  2/4 dorsalis pedis pulses bilaterally.  Bilateral lower extremities are warm.  There is no pitting edema of the bilateral lower extremities.  ABDOMINAL:  Soft, non-distended, non-tender throughout all quadrants.  No pulsatile mass palpated in the left lower quadrant.  LYMPH NODES:  No palpable or tender inguinal lymph nodes.  MUSCULOSKELETAL: Straight leg raise on the right reproduces right low back and buttock pain.  Straight leg raise negative on the left.    RESULTS: I reviewed the CT abdomen and pelvis from June 2015.  This shows anterolisthesis of L4 on L5.  There is also severe disc degeneration L5-S1.

## 2021-05-30 NOTE — PROGRESS NOTES
Assessment/Plan:    Heather Blakely is a 69 y.o. female presenting for:    1. Pain of right lower leg  Etiology of her lower leg pain is unclear.  This is in the L4 dermatome so potentially a nerve issue.  I think less likely this would represent any sort of blood clot given that there is really no redness or warmth or swelling as well as the intermittent nature of this pain.  If it does not seem muscular given that she is not having any muscle spasms.  I have opted to put her on a short course of prednisone therapy to see if this is beneficial.  If not we could consider further imaging with an ultrasound to rule out blood clots as well as potentially consider an EMG.  She does have a history of low back issues and I think getting a low back MRI would be reasonable as well.  She will contact me in 5 days and let me know how she is doing.  - predniSONE (DELTASONE) 20 MG tablet; Take 20 mg by mouth 2 (two) times a day for 5 days.  Dispense: 10 tablet; Refill: 0    Otherwise, the patient follows with a cardiologist who refills her blood pressure medications.    There are no discontinued medications.        Chief Complaint:  Chief Complaint   Patient presents with     Leg Pain     R leg x 6 months- worse this past week- denies any known injury       Subjective:   Heather Blakely is a very pleasant 69-year-old female with a past medical history significant for nonischemic cardiomyopathy who is following with cardiology presenting to the clinic today with concerns over right-sided lower leg pain.  She notes that for the past 6 months sometimes at night her inner right lower calf will be painful.  This is an somewhat of a specific location.  She generally sleeps on her right side and will notice that this wakes her up sometimes at night with an achy pain.  She does not notice any muscular spasms.  Over the last 2 weeks it has become a bit painful throughout the day as well.  Again, she describes the pain as an ache.  She  "has not noticed any swelling.  Usually when she is up and moving around the seems a bit better but when she sits down it sometimes gets a bit worse.  She has had issues with sciatica and she notes that the ache feels similar although in a different location.    She does note that she has a \"bad back\" and need to get back into see her spine specialist.  She is unsure of exactly what her back issues have been in the past.    12 point review of systems completed and negative except for what has been described above    Social History     Tobacco Use   Smoking Status Former Smoker   Smokeless Tobacco Never Used   Tobacco Comment    Quit in 1975       Current Outpatient Medications   Medication Sig     carvedilol (COREG) 3.125 MG tablet Take 3.125 mg by mouth daily.      cholecalciferol, vitamin D3, 2,000 unit Tab Take 4,000 Units by mouth.     lisinopril (PRINIVIL,ZESTRIL) 2.5 MG tablet Take 2.5 mg by mouth daily.     ranitidine (ZANTAC) 150 MG tablet Take 1 tablet (150 mg total) by mouth 2 (two) times a day.     predniSONE (DELTASONE) 20 MG tablet Take 20 mg by mouth 2 (two) times a day for 5 days.         Objective:  Vitals:    06/26/19 1518   BP: 118/70   Pulse: 72   Resp: 16   Temp: 98.1  F (36.7  C)   TempSrc: Oral   Weight: (!) 235 lb 4 oz (106.7 kg)   Height: 5' 3\" (1.6 m)       Body mass index is 41.67 kg/m .    Vital signs reviewed and stable  General: No acute distress  Psych: Appropriate affect  HEENT: moist mucous membranes  Lymph: no cervical or supraclavicular lymphadenopathy  Cardiovascular: regular rate and rhythm with no murmur  Pulmonary: clear to auscultation bilaterally with no wheeze  Abdomen: soft, non tender, non distended with normo-active bowel sounds  Extremities: warm and well perfused with no edema  Skin: warm and dry with no rash  Musculoskeletal: Patient does have some mild tenderness palpation of the right inner calf but very mild.  Well-healed scar from knee replacement on the right.  " Normal range of motion and leg.  No swelling or warmth or redness.  Straight leg raise is negative.  Sensation in the area of pain is slightly decreased.       This note has been dictated and transcribed using voice recognition software.   Any errors in transcription are unintentional and inherent to the software.

## 2021-05-30 NOTE — TELEPHONE ENCOUNTER
See [employer] message from patient, please reply via [employer]    Plan from visit on 6/26 states:    1. Pain of right lower leg  Etiology of her lower leg pain is unclear.  This is in the L4 dermatome so potentially a nerve issue.  I think less likely this would represent any sort of blood clot given that there is really no redness or warmth or swelling as well as the intermittent nature of this pain.  If it does not seem muscular given that she is not having any muscle spasms.  I have opted to put her on a short course of prednisone therapy to see if this is beneficial.  If not we could consider further imaging with an ultrasound to rule out blood clots as well as potentially consider an EMG.  She does have a history of low back issues and I think getting a low back MRI would be reasonable as well.  She will contact me in 5 days and let me know how she is doing.  - predniSONE (DELTASONE) 20 MG tablet; Take 20 mg by mouth 2 (two) times a day for 5 days.  Dispense: 10 tablet; Refill: 0

## 2021-05-30 NOTE — PATIENT INSTRUCTIONS - HE
Gabapentin 300mg Dosing Chart    DATE  MORNING AFTERNOON BEDTIME    Day 1 0 0 1    Day 2 0 0 1    Day 3 0 0 1    Day 4 1 0 1    Day 5 1 0 1    Day 6 1 0 1    Day 7 1 1 1    Day 8 1 1 1    Day 9 1 1 1     Continue medication, taking 1 capsules three times daily    Please call if you have any questions regarding how to take your medication  Clinic Phone # [phone]

## 2021-05-31 VITALS — HEIGHT: 63 IN | WEIGHT: 222.44 LBS | BODY MASS INDEX: 39.41 KG/M2

## 2021-05-31 VITALS — HEIGHT: 63 IN | WEIGHT: 223.7 LBS | BODY MASS INDEX: 39.64 KG/M2

## 2021-05-31 VITALS — BODY MASS INDEX: 39.86 KG/M2 | WEIGHT: 225 LBS

## 2021-05-31 VITALS — HEIGHT: 63 IN | BODY MASS INDEX: 39.9 KG/M2 | WEIGHT: 225.19 LBS

## 2021-05-31 NOTE — PROGRESS NOTES
"[facility] Daily Progress     Patient Name: Heather Blakely  Date: 2019  Visit #: 2  PTA visit #:  -  Referral Diagnosis: spondylolisthesis of lumbar region  Referring provider: Nathaly Linda  Visit Diagnosis:     ICD-10-CM    1. Spondylolisthesis of lumbar region M43.16    2. Right lumbar radiculopathy M54.16    3. Generalized muscle weakness M62.81        Assessment:   Patient comes in for first f/u appt today. She demonstrates improvements in pain and decreased neural tension.     HEP/POC compliance is  good .  Patient demonstrates understanding/independence with home program.  Patient is benefitting from skilled physical therapy and is making steady progress toward functional goals.  Patient is appropriate to continue with skilled physical therapy intervention, as indicated by initial plan of care.    Goal Status:  Pt. will demonstrate/verbalize independence in self-management of condition in : 12 weeks  Pt. will be independent with home exercise program in : 6 weeks  Pt. will have improved quality of sleep: with less pain;waking less times/night;in 4 weeks  Patient will stand : 30 minutes;with less pain;with less difficultty;in 12 weeks;for home chores    Patient will decrease : DAVID score;by _ points;for improved quality of function;for improved quality of life;in 6 weeks  by ___ points: 6      Plan / Patient Education:     Continue with initial plan of care.  Progress with home program as tolerated.    Exercises:  Exercise #1: SKTC and piriformis stretch w/ belt 30\" Holds x2-3 B  Comment #1: supine nerve glides x20 on R  Exercise #2: clamshell 5\" holds until fatigue B  Comment #2: abd set x20 with 5\" holds    Plan for next visit: how are exercises going? Did she get a heel lift? Nu' step, abd set+single leg ext, standing hip abd/ext exs    Subjective:     Pain Ratin/10 into R calf    Feeling better. Feels stretch with her SKTC. Was achy this morning when she woke up but not having pain like " she was before. Sleeping is better since adding nerve glides before bed.       Objective:     Lumbar AROM:    Flexion/ext: pain-free, WFL   R SB- increase in R lower back pain   L SB- WFL, pain-free   R rot- WFL, increase in pain   L rot- WFL, pain-free    Slump: negative B  L LE shorter in standing and supine.   Gait: lateral flexion to the L w/ WBing on L      Treatment Today     TREATMENT MINUTES COMMENTS   Evaluation     Self-care/ Home management     Manual therapy     Neuromuscular Re-education     Therapeutic Activity     Therapeutic Exercises 26 Discussed progress. Objective measures taken. Progressed HEP- see flow sheet for details. Nu' step 5' L6. Discussed heel lift   Gait training     Modality__________________     Performance Test           Total 26    Blank areas are intentional and mean the treatment did not include these items.       Fox Duque, PT, DPT  8/14/2019

## 2021-05-31 NOTE — PROGRESS NOTES
"[facility] Daily Progress     Patient Name: Heather Blakely  Date: 8/21/2019  Visit #: 3  PTA visit #:  -  Referral Diagnosis: spondylolisthesis of lumbar region  Referring provider: Nathaly Linda  Visit Diagnosis:     ICD-10-CM    1. Spondylolisthesis of lumbar region M43.16    2. Right lumbar radiculopathy M54.16    3. Generalized muscle weakness M62.81        Assessment:   Patient reports feeling about 75% better since beginning PT. She is going to work on her HEP and return in 2 weeks for a follow up.    HEP/POC compliance is  good .  Patient demonstrates understanding/independence with home program.  Patient is benefitting from skilled physical therapy and is making steady progress toward functional goals.  Patient is appropriate to continue with skilled physical therapy intervention, as indicated by initial plan of care.    Goal Status:  Pt. will demonstrate/verbalize independence in self-management of condition in : 12 weeks  Pt. will be independent with home exercise program in : 6 weeks  Pt. will have improved quality of sleep: with less pain;waking less times/night;in 4 weeks  Patient will stand : 30 minutes;with less pain;with less difficultty;in 12 weeks;for home chores    Patient will decrease : DAVID score;by _ points;for improved quality of function;for improved quality of life;in 6 weeks  by ___ points: 6      Plan / Patient Education:     Continue with initial plan of care.  Progress with home program as tolerated.    Exercises:  Exercise #1: SKTC and piriformis stretch w/ belt 30\" Holds x2-3 B  Comment #1: supine nerve glides x20 on R  Exercise #2: clamshell 5\" holds until fatigue B  Comment #2: abd set +single leg extension x20 with 5\" holds  Exercise #3: bridge hold for 5\" holds, x10  Comment #3: standing hip ext     Plan for next visit: Did she get a heel lift? Nu' step, how are her exs going? Goals? D/C?    Subjective:     Pain Rating: \"not really\"    Back does feel better. Cleaning " around the toilets and in bathroom can increase pain. Feeling at least 75% better since beginning PT      Objective:     No increase in pain w/ today's session      Treatment Today     TREATMENT MINUTES COMMENTS   Evaluation     Self-care/ Home management     Manual therapy     Neuromuscular Re-education     Therapeutic Activity     Therapeutic Exercises 23 Discussed progress. Objective measures taken. Progressed HEP- see flow sheet for details. Nu' step 5' L5.    Gait training     Modality__________________     Performance Test           Total 23    Blank areas are intentional and mean the treatment did not include these items.       Fox Duque, PT, DPT  8/21/2019

## 2021-05-31 NOTE — TELEPHONE ENCOUNTER
Phone call to patient to review results and provider's recommendations. Results given and explained. Discussed treatment plan recommendation to continue with PT and then follow up with provider in 4 weeks. States she will be starting PT on Monday. She will call back to make the follow up when she knows her schedule.

## 2021-05-31 NOTE — TELEPHONE ENCOUNTER
----- Message from Nathaly Linda PA-C sent at 7/31/2019  4:17 PM CDT -----  Please call patient let her know I reviewed her MRI lumbar spine.  This does show some narrowing around the nerves at L4-5 which correlates with her pain.  I recommended the patient continue with physical therapy and follow-up with me in 4 weeks.

## 2021-05-31 NOTE — TELEPHONE ENCOUNTER
Pt last seen and filled on 7/23/19.3 LM, if patient calls back please verify dosing she is taking.

## 2021-05-31 NOTE — PROGRESS NOTES
[facility] Certification Request    August 5, 2019      Patient: Heather Blakely  MR Number: [id number]  YOB: 1950  Date of Visit: 8/5/2019      Dear Nathaly Linda,    Thank you for this referral.   We are seeing Heather Blakely for Physical Therapy of spondylolisthesis of lumbar region.    Medicare and/or Medicaid requires physician review and approval of the treatment plan. Please review the plan of care and verify that you agree with the therapy plan of care by co-signing this note.      Plan of Care  Authorization / Certification Start Date: 08/05/19  Authorization / Certification End Date: 11/05/19  Authorization / Certification Number of Visits: 12  Communication with: Referral Source  Patient Related Instruction: Nature of Condition;Next steps;Expected outcome;Treatment plan and rationale;Self Care instruction;Basis of treatment;Body mechanics;Posture;Precautions  Times per Week: 1-2  Number of Weeks: 12  Number of Visits: 12  Discharge Planning: when PT goals are met  Therapeutic Exercise: ROM;Stretching;Strengthening  Neuromuscular Reeducation: core;balance/proprioception;kinesio tape  Manual Therapy: soft tissue mobilization;myofascial release;joint mobilization;muscle energy  Equipment: theraband      Goals:  Pt. will demonstrate/verbalize independence in self-management of condition in : 12 weeks  Pt. will be independent with home exercise program in : 6 weeks  Pt. will have improved quality of sleep: with less pain;waking less times/night;in 4 weeks  Patient will stand : 30 minutes;with less pain;with less difficultty;in 12 weeks;for home chores    Patient will decrease : DAVID score;by _ points;for improved quality of function;for improved quality of life;in 6 weeks  by ___ points: 6        If you have any questions or concerns, please don't hesitate to call.    Sincerely,      Fox Duque, PT, DPT        Physician recommendation:     ___ Follow therapist's recommendation         ___ Modify therapy      *Physician co-signature indicates they certify the need for these services furnished within this plan and while under their care.        [facility]   [facility] Initial Evaluation    Patient Name: Heather Blakely  Date of evaluation: 8/5/2019  Referral Diagnosis: spondylolisthesis of lumbar region  Referring provider: Nathaly Linda PA-C  Visit Diagnosis:     ICD-10-CM    1. Spondylolisthesis of lumbar region M43.16    2. Right lumbar radiculopathy M54.16    3. Generalized muscle weakness M62.81          Assessment:      Heather Blakely is a 69 y.o. female who presents to therapy today with chief complaints of chronic right lower back pain and pain radiating into her right calf. Most recent onset of symptoms began 6 months ago without known injury. Difficulty with housework/cleaning, sleeping lifting, walking, sitting, standing, traveling due to pain.  Pain symptoms are intermittent and not improving.  Patient demonstrates signs and sx consistent with right lumbar radiculopathy. PT POC and goals have been discussed with patient and She  is agreeable to these. Patient appears motivated for physical therapy and is appropriate for skilled therapy services.    The POC is dynamic and will be modified on an ongoing basis.  Barriers to achieving goals as noted in the assessment section may affect outcome.  Prognosis to achieve goals is  fair   Pt. is appropriate for skilled PT intervention as outlined in the Plan of Care (POC).  Pt. is a good candidate for skilled PT services to improve pain levels and function.    Goals:  Pt. will demonstrate/verbalize independence in self-management of condition in : 12 weeks  Pt. will be independent with home exercise program in : 6 weeks  Pt. will have improved quality of sleep: with less pain;waking less times/night;in 4 weeks  Patient will stand : 30 minutes;with less pain;with less difficultty;in 12 weeks;for home chores    Patient will  "decrease : DAVID score;by _ points;for improved quality of function;for improved quality of life;in 6 weeks  by ___ points: 6      Patient's expectations/goals are realistic.    Barriers to Learning or Achieving Goals:  Chronicity of the problem.  Co-morbidities or other medical factors.  see PM       Plan / Patient Instructions:        Plan of Care:   Authorization / Certification Start Date: 08/05/19  Authorization / Certification End Date: 11/05/19  Authorization / Certification Number of Visits: 12  Communication with: Referral Source  Patient Related Instruction: Nature of Condition;Next steps;Expected outcome;Treatment plan and rationale;Self Care instruction;Basis of treatment;Body mechanics;Posture;Precautions  Times per Week: 1-2  Number of Weeks: 12  Number of Visits: 12  Discharge Planning: when PT goals are met  Therapeutic Exercise: ROM;Stretching;Strengthening  Neuromuscular Reeducation: core;balance/proprioception;kinesio tape  Manual Therapy: soft tissue mobilization;myofascial release;joint mobilization;muscle energy  Equipment: theraband      POC and pathology of condition were reviewed with patient.  Pt. is in agreement with the Plan of Care  A Home Exercise Program (HEP) was initiated today.  Pt. was instructed in exercises by PT and patient was given a handout with detailed instructions.    Exercises:  Exercise #1: SKTC and piriformis stretch w/ belt 30\" Holds x2-3 B  Comment #1: supine nerve glides x20 on R      Plan for next visit: is sleeping any better? measure leg length again, lifting mechanics, re-test nerve (slump and sLR) and directional preference, clamshell,      Subjective:         Social information:   Occupation: retired programming   Hobbies: quilting/sewing daily, walking 3x/week for about 1 mile, housework    History of Present Illness:    Heather Blakely is a 69 y.o. female who presents to therapy today with complaints of chronic right lower back pain. Date of onset/duration of " symptoms is 6 months ago. Onset was gradual.  Symptoms are intermittent and not improving.  She describes their previous level of function as not limited. Her biggest complain is R calf pain while sleeping at night. She will wake up at least 1x/night with a burning sensation in her R calf and it will keep her up. It hurts to touch it. Sometimes she'll take some aspirin or tylenol but that doesn't take the pain away. Walking doesn't change her pain. There are periods of time where she doesn't have pain unless she pushes on her R calf. Hasn't tried icing/heating.     Pain Ratin  Pain rating at best: 0  Pain rating at worst: 8  Pain description: burning    Functional limitations are described as occurring with: housework/cleaning, sleeping lifting, walking, sitting, standing, traveling         Objective:      Note: Items left blank indicates the item was not performed or not indicated at the time of the evaluation.    Patient Outcome Measures :    Modified Oswestry Low Back Pain Disablity Questionnaire  in %: 38     Scores range from 0-100%, where a score of 0% represents minimal pain and maximal function. The minimal clinically important difference is a score reduction of 12%.    Examination  1. Spondylolisthesis of lumbar region     2. Right lumbar radiculopathy     3. Generalized muscle weakness         Involved side: Right    Gait: SB to the R w/ gait, no AD, stable    Lumbar ROM:    Date:      *Indicate scale AROM AROM AROM   Lumbar Flexion Distal tibia, increase in R sided LBP     Lumbar Extension WFL, increase in R-sided LBP      Right Left Right Left Right Left   Lumbar Sidebending Increase in R sided LBP, WFL WFL, pain-free       Lumbar Rotation WFL, increase in R sided LBP WFL, pain-free       Thoracic Flexion      Thoracic Extension      Thoracic Sidebending         Thoracic Rotation           Lower Extremity Strength:   WFL, pain-free  Date:      LE strength/5 Right Left Right Left Right Left   Hip  Flexion (L1-3)         Hip Extension (L5-S1)         Hip Abduction (L4-5)         Hip Adduction (L2-3)         Hip External Rotation         Hip Internal Rotation         Knee Extension (L3-4)         Knee Flexion         Ankle Dorsiflexion (L4-5)         Great Toe Extension (L5)         Ankle Plantar flexion (S1)         Abdominals        Sensation   Intact per subjective    Palpation: tender R glut, R greater troch, R of sacrum    L LE shorter than R      Lumbar Special Tests:     Lumbar Special Tests Right Left SI Tests Right  Left   Quadrant test   SI Compression     Straight leg raise +, 80 degrees 85- neg SI Distraction     Crossover response neg neg POSH Test - -   Slump neg neg Sacral Thrust     Sit-up test  NEHEMIAH - -   Trunk extensor endurance test  Resisted Abduction - -   Prone instability test  Other:     Pubic shotgun  Other:         LE Screen:  B hip PROM: WFL, pain-free    Treatment Today     TREATMENT MINUTES COMMENTS   Evaluation 16 Low back   Self-care/ Home management 8 Recommend patient ice daily and avoid painful activities. Recommend she avoid sitting for long periods of time and avoid BLT.   Manual therapy     Neuromuscular Re-education     Therapeutic Activity     Therapeutic Exercises 14 Discussed PT POC and pathology of condition. Answered patient questions. Began HEP-see flowsheet.    Gait training     Modality__________________                Total 38    Blank areas are intentional and mean the treatment did not include these items.            PT Evaluation Code: (Please list factors)  Patient History/Comorbidities: see PMH  Examination: chronic LBP  Clinical Presentation: stable  Clinical Decision Making: low    Patient History/  Comorbidities Examination  (body structures and functions, activity limitations, and/or participation restrictions) Clinical Presentation Clinical Decision Making (Complexity)   No documented Comorbidities or personal factors 1-2 Elements Stable and/or  uncomplicated Low   1-2 documented comorbidities or personal factor 3 Elements Evolving clinical presentation with changing characteristics Moderate   3-4 documented comorbidities or personal factors 4 or more Unstable and unpredictable High              Fox Duque, PT, DPT  8/5/2019  2:00 PM

## 2021-06-01 VITALS — HEIGHT: 63 IN | BODY MASS INDEX: 39.5 KG/M2 | WEIGHT: 222.9 LBS

## 2021-06-01 NOTE — PROGRESS NOTES
"Assessment:   Heather Blakely is a 69 y.o. y.o. female with past medical history significant for vitamin D deficiency, hyperlipidemia, congestive heart failure who presents today for follow-up regarding 2 areas of pain.  1.  Chronic right low back pain with radiation into the right lower extremity.  MRI lumbar spine shows degenerative spondylolisthesis at L4-5.  There also appears to be a synovial cyst on the right at L3-4.  Symptoms follow an L4 distribution.  Patient reports 80% improvement in this pain with physical therapy.  2.  Chronic left groin pain likely related to osteoarthritis of the left hip.  This is been evaluated by orthopedics several years ago and she was told that she would likely need a hip replacement.       Plan:     A shared decision making plan was used.  The patient's values and choices were respected.  The following represents what was discussed and decided upon by the physician assistant and the patient.      1.  DIAGNOSTIC TESTS: Reviewed the MRI lumbar spine.  No further diagnostic tests were ordered.    2.  PHYSICAL THERAPY: Patient is currently in physical therapy.  She has had 4 sessions of far.  I encouraged her to continue with physical therapy and the home exercises.    3.  MEDICATIONS: No changes are made to the patient's medications.  She will continue using Tylenol.  She tried gabapentin but did not tolerate side effects.  She states that she felt \"loopy.\"    4.  INTERVENTIONS: No interventions were ordered.  Patient does not feel that her current pain warrants any interventional pain management.  If pain were to worsen, we could consider interventional pain management in the future.  - If leg pain is a primary concern, I recommend a right L4-5 transforaminal epidural steroid injection.  -If axial low back pain is a primary concern, would recommend lumbar facet joint injections.  -If left groin pain were to worsen, we could try a left intra-articular hip joint injection under " "ultrasound guidance.    5.  PATIENT EDUCATION: Patient is in agreement the above plan.  All questions were answered.  -I also told the patient that if her left hip pain worsens, she can follow-up with Dr. Sauer at [facility] orthopedics.  This is who evaluated her hip previously.    6.  FOLLOW-UP: Patient to follow-up with me as needed.  If she has questions or concerns, she should not hesitate to call.    Subjective:     Heather Blakely is a 69 y.o. female who presents today for follow-up regarding right low back pain with radiation into the right lower extremity as well as left hip pain.  I saw the patient in consultation on July 23 from 2019.  At that time I referred the patient to physical therapy.  I ordered an MRI lumbar spine.  I also prescribed gabapentin.  Patient has had 4 sessions of physical therapy.  She reports 80% improvement in her back and leg pain.  She did not tolerate gabapentin.    Patient complains of only mild residual right low back pain.  Pain is located in the lower lumbar region and extends to the upper buttock.  She states that approximately every other day she feels a nerve pain sensation in the right medial shin.  She states this typically lasts for 15 to 20 minutes and resolves.  Nerve glides helped to resolve it more quickly.  She states this pain is no longer keeping her up at night.  She is very satisfied with her progress.  Patient also continues to complain of left groin pain.  Overall, she rates her pain today as a 2 out of 10.  At its worst it is a 5 out of 10.  At its best it is a 1 out of 10.  Pain is aggravated with bending and alleviated with Tylenol and rest.    Patient has had 4 sessions of physical therapy.  She does her home exercises.  She is using Tylenol as needed for pain.  As mentioned above, she did not tolerate gabapentin.  It made her feel \"loopy.\"    Past medical history is reviewed and is unchanged.    Review of Systems:  Positive for pain worse at night.  " Negative for numbness/Alesha, weakness, loss of bowel/bladder control, inability to urinate, headache, difficulty swallowing, difficulty with hand skills, fevers, unintentional weight loss.     Objective:   CONSTITUTIONAL:  Vital signs as above.  No acute distress.  The patient is well nourished and well groomed.    PSYCHIATRIC:  The patient is awake, alert, oriented to person, place and time.  The patient is answering questions appropriately with clear speech.  Normal affect.  HEENT: Normocephalic, atraumatic.  Sclera clear.    SKIN:  Skin over the face, posterior torso, bilateral upper and lower extremities is clean, dry, intact without rashes.  MUSCULOSKELETAL:  Gait is non-antalgic.  .  Mild tenderness over the neck right lower lumbar paraspinal muscles.  Mild tenderness palpation right sacroiliac joint.    The patient has 5/5 strength for the bilateral hip flexors, knee flexors/extensors, ankle dorsiflexors/plantar flexors, ankle evertors/invertors.  External rotation of left hip is within normal limits.  Internal rotation moderately restricted with reproduction of left groin pain.  NEUROLOGICAL: Reflexes are 2+ left and 1+ right patella, 2+ bilateral Achilles.  No ankle clonus bilaterally.  Sensation to light touch is intact in the bilateral L4, L5, and S1 dermatomes.       RESULTS: I reviewed the MRI lumbar spine from [facility] dated July 31, 2019.  This shows mild grade 1 degenerative spondylolisthesis L4 and L5 resulting in mild central canal stenosis and bilateral lateral recess stenosis.  There is also minimal central canal stenosis at L3-4.  Patient has facet arthropathy throughout the lumbar spine which is advanced at L4-5, moderate at L3-4, and mild elsewhere.  Please see report for further details.

## 2021-06-01 NOTE — PROGRESS NOTES
[facility] Daily Progress     Patient Name: Heather Blakely  Date: 9/4/2019  Visit #: 4  PTA visit #:  -  Referral Diagnosis: spondylolisthesis of lumbar region  Referring provider: Nathaly Linda  Visit Diagnosis:     ICD-10-CM    1. Spondylolisthesis of lumbar region M43.16    2. Right lumbar radiculopathy M54.16    3. Generalized muscle weakness M62.81        Assessment:   Patient reports feeling significantly better with her back pain with about 80% progress. However, she flared her left groin/hip [place] and it continues to be very sore, affecting activities that include stairs, putting socks/shoes on, getting out of a chair/car, and walking. After testing today, it appears that her left groin pain is coming from her hip rather than her back. I encouraged her to ice the side of her left hip and to rest until she returns next week. Patient does have a follow up with Nathaly Linda on Monday, September 9th.    HEP/POC compliance is  good .  Patient demonstrates understanding/independence with home program.  Patient is benefitting from skilled physical therapy and is making steady progress toward functional goals.  Patient is appropriate to continue with skilled physical therapy intervention, as indicated by initial plan of care.    Goal Status:  Pt. will demonstrate/verbalize independence in self-management of condition in : 12 weeks  Pt. will be independent with home exercise program in : 6 weeks  Pt. will have improved quality of sleep: with less pain;waking less times/night;in 4 weeks  Patient will stand : 30 minutes;with less pain;with less difficultty;in 12 weeks;for home chores    Patient will decrease : DAVID score;by _ points;for improved quality of function;for improved quality of life;in 6 weeks  by ___ points: 6      Plan / Patient Education:     Continue with initial plan of care.  Progress with home program as tolerated.    Exercises:  Exercise #1: SKTC and  "piriformis stretch w/ belt 30\" Holds x2-3 B  Comment #1: supine nerve glides x20 on R  Exercise #2: clamshell 5\" holds until fatigue B  Comment #2: abd set +single leg extension x20 with 5\" holds  Exercise #3: bridge hold for 5\" holds, x10  Comment #3: standing hip ext     Plan for next visit: how's the heel lift going? How's left hip? Nu' step, add band to clamshell? Goals? D/C?    Subjective:     Pain Rating: L groin pain- 3-4/10    Was doing really well until [place] the [place], then her left started to hurt. She then went back 3 days later and the left hip was still pretty bothersome. Hip has been really sore since. Hasn't iced. Used Tylenol which helped a little. Purchased heel lift but hasn't used it yet. Compliant with her HEP. Back is doing well and is about 80% better since beginning therapy.     L groin pain increases w/: transfers, walking, stairs, sleeping on it, putting shoes/socks on      Objective:     Slump: neg B  Lumbar AROM:    Flexion- WFL, pain-free   Extension- WFL, increase in L groin pain   R SB- WFL, pain-free   L SB- increase in L groin pain, WFL   L rot- increase in L groin pain, WFL   R rot- WFL, pain-free    L hip PROM: all increase L groin pain  L hip scour: +  L NEHEMIAH: +    Treatment Today     TREATMENT MINUTES COMMENTS   Evaluation     Self-care/ Home management     Manual therapy     Neuromuscular Re-education     Therapeutic Activity     Therapeutic Exercises 23 Discussed progress. Objective measures taken. Progressed HEP- see flow sheet for details. Nu' step 5' L5.    Gait training     Modality__________________     Performance Test           Total 23    Blank areas are intentional and mean the treatment did not include these items.       Fox Duque, PT, DPT  9/4/2019    "

## 2021-06-02 VITALS — HEIGHT: 63 IN | BODY MASS INDEX: 40.66 KG/M2 | WEIGHT: 229.5 LBS

## 2021-06-02 NOTE — TELEPHONE ENCOUNTER
"Call to pt with provider's results and recommendations. Pt stated understanding. Pt stated the prednisone is \"just barely helping but not much at all\". Pt has not yet scheduled PT or follow-up. She states she will do these things today.       "

## 2021-06-02 NOTE — PROGRESS NOTES
Assessment:   Heather Blakely is a 69 y.o. y.o. female with past medical history significant for vitamin D deficiency, hyperlipidemia, congestive heart failure who presents today for follow-up regarding a 4-week history of pain involving the left buttock, left groin, with radiation into the left anterior thigh.  I believe patient is primarily symptomatic from x-ray documented severe degenerative change left hip joint.  She had reproduction of groin pain with left hip flexion and internal greater than external rotation.  There may also be some radicular component to her pain.  She does have degenerative spondylolisthesis L4-5 with lateral recess stenosis.  SI joint provocative maneuvers did not reproduce pain localizing to the SI joint.       Plan:     A shared decision making plan was used.  The patient's values and choices were respected.  The following represents what was discussed and decided upon by the physician assistant and the patient.      1.  DIAGNOSTIC TESTS: Reviewed the MRI lumbar spine.  Also reviewed the x-ray left hip.  No further diagnostic tests were ordered.    2.  PHYSICAL THERAPY: Patient completed 4 sessions of physical therapy last month.  For right lower extremity radicular pain she was having at that time.  She is scheduled to begin physical therapy tomorrow for her left hip.    3.  MEDICATIONS:   -Patient will continue using aspirin as needed.  -Patient did not have any relief with oral steroids.    4.  INTERVENTIONS: I offered the patient a left intra-articular hip joint injection.  Patient indicates she would like to schedule this for mid December.  She is having bilateral cataract surgery over the next month.  She wants to wait until after she has had her surgeries before she has her injection.  The order was placed.  She will have this injection under ultrasound guidance with Dr. Frank.    5.  PATIENT EDUCATION:   -I told patient if she fails to improve with a left intra-articular  hip joint injection, recommend a referral to orthopedics.  -Patient is in agreement the above plan.  All questions were answered.    6.  FOLLOW-UP: I will see the patient back in the clinic for a 2-week post procedure follow-up visit after her left intra-articular hip joint injection.  If she has questions or concerns in the meantime, she should not hesitate to call.    Subjective:     Heather Blakely is a 69 y.o. female who presents today for follow-up regarding a 4-week history of left lower extremity pain.  I last saw the patient on October 17, 2019.  At that time she is complaining of pain in the left groin, lateral knee, anterior shin.  I prescribed oral steroids and referred her to physical therapy.  She states the steroids were not helpful.  She is scheduled to begin physical therapy tomorrow.  Patient reports that her pain is now only located in the left groin and left buttock.  She states that occasionally she feels it extend into the left anterior thigh.  She denies any pain at the left knee.  She denies any pain distal to the knee.    Patient complains of left buttock and left groin pain.  Pain extends into the proximal left anterior thigh.  She denies pain further down the leg.  She rates her pain today as a 4 out of 10.  At its worst it is a 7 out of 10.  At its best it is a 2 out of 10.  Pain is aggravated with walking.  Pain is aggravated with bending.  She has difficulty dressing because of the pain.  She states that she has increased pain when she tries to lower herself into a chair.  She also has increased pain with ascending stairs.  She states that she has to take the stairs one at a time, leading with her right foot.  She also is difficulty getting into her truck.  Pain is alleviated with rest.  She denies any new symptoms and she was last seen.  She denies any numbness, tingling, or weakness down the leg.    Patient has had 4 sessions of physical therapy for right lower extremity radicular  pain.  She is scheduled to return to physical therapy tomorrow for her left-sided pain.  She is using aspirin 325 mg 2 tabs twice daily.    Past medical history is reviewed and is unchanged.    Review of Systems:    Negative for numbness/[place], weakness, loss of bowel/bladder control, inability to urinate, headache, difficulty swallowing, difficulty with hand skills, fevers, unintentional weight loss, pain worse at night.     Objective:   CONSTITUTIONAL:  Vital signs as above.  No acute distress.  The patient is well nourished and well groomed.    PSYCHIATRIC:  The patient is awake, alert, oriented to person, place and time.  The patient is answering questions appropriately with clear speech.  Normal affect.  HEENT: Normocephalic, atraumatic.  Sclera clear.    SKIN:  Skin over the face, posterior torso, bilateral upper and lower extremities is clean, dry, intact without rashes.  MUSCULOSKELETAL:  Gait is mildly antalgic, favoring the left.  Mild tenderness over the left lower lumbar paraspinous muscles.  Mild tenderness palpation left sacroiliac joint.  Mild tenderness palpation left greater trochanter.  Mild tenderness palpation left groin..   The patient has 5/5 strength for the bilateral hip flexors, knee flexors/extensors, ankle dorsiflexors/plantar flexors, ankle evertors/invertors.  External rotation left hip moderately short with reproduction of groin pain.  Internal rotation left hip severely restricted with reproduction of left groin pain.  Flexion of the left hip reproduces left groin pain.  Negative pelvic distraction test.  Thigh thrust on the left causes groin pain, but no buttock pain.  Emiliano's test on left causes groin pain, no buttock pain.    NEUROLOGICAL: Reflexes are 2+ left and 1+ right patella, 2+ bilateral Achilles.  No ankle clonus bilaterally.  Sensation to light touch is intact in the bilateral L4, L5, and S1 dermatomes.       RESULTS: I reviewed the MRI lumbar spine from [facility]  dated July 31, 2019.  This shows mild grade 1 degenerative spondylolisthesis L4 and L5 resulting in mild central canal stenosis and bilateral lateral recess stenosis.  There is also minimal central canal stenosis at L3-4.  Patient has facet arthropathy throughout the lumbar spine which is advanced at L4-5, moderate at L3-4, and mild elsewhere.  Please see report for further details.    EXAM: XR HIP LEFT 2 OR MORE VWS  LOCATION: [facility]  DATE/TIME: 10/21/2019 9:08 AM     INDICATION: left hip pain  COMPARISON: Abdomen CT 6/29/2015     IMPRESSION:   Severe degenerative narrowing noted superiorly in the left hip with subchondral cystic changes. Degree of narrowing seems to have progressed in the interval. Minimal spurring along the lateral acetabular margin.     Visualized SI joint and sacrum are normal. No fracture.

## 2021-06-02 NOTE — PROGRESS NOTES
"Assessment:   Heather Blakely is a 69 y.o. y.o. female with past medical history significant for vitamin D deficiency, hyperlipidemia, congestive heart failure who presents today for follow-up regarding a 2-week history of left lower extremity pain.  Pain seems multifactorial.  Patient has known osteoarthritis of the left hip.  She does have pain involving the groin and reproduction of groin pain with internal and external rotation of the hip.  Pain then radiates down the anterolateral thigh to the lateral knee and extends into the anterior shin.  Some of this may be referred pain from the hip, but some of it sounds like it could be radicular in nature (extends past the knee, burning quality to pain).  MRI lumbar spine did show lateral recess stenosis at L4-5 related to degenerative spondylolisthesis.  Patient was neurologically intact on exam.       Plan:     A shared decision making plan was used.  The patient's values and choices were respected.  The following represents what was discussed and decided upon by the physician assistant and the patient.      1.  DIAGNOSTIC TESTS: Reviewed the MRI lumbar spine.  I ordered an x-ray of the left hip.  If pain persist, she may need a repeat MRI lumbar spine.    2.  PHYSICAL THERAPY: Patient completed 4 sessions of physical therapy last month.  I entered a referral for the patient to return to physical therapy for her new left-sided pain.    3.  MEDICATIONS:   -Provide a prescription for prednisone 20 mg twice daily for 5 days.  -Patient should hold aspirin while she takes the prednisone.  She may resume aspirin after she completes prednisone.  -She tried gabapentin but did not tolerate side effects.  She states that she felt \"loopy.\"    4.  INTERVENTIONS: No interventions were ordered.  Patient may benefit from interventional pain management if pain fails to improve.  -Of groin pain his biggest concern, we could try left intra-articular hip joint injection.  -If " left-sided radicular pain fails to improve, she may need an epidural.  I would likely obtain an updated MRI lumbar spine first.    5.  PATIENT EDUCATION: Patient is in agreement the above plan.  All questions were answered.    6.  FOLLOW-UP: I will see the patient back in the clinic in 2 weeks.  A nurse will call patient with results of her hip in the meantime.  If she has questions or concerns in the meantime, she should not hesitate to call.    Subjective:     Heather Blakely is a 69 y.o. female who presents today for follow-up regarding a 2-week history of left lower extremity pain.  I had previously been seeing this patient for right low back pain with radiation into the right lower extremity.  Right-sided pain resolved with physical therapy.  Unfortunately, left-sided pain began 2 weeks ago.  She states that she was added open house and doing some walking when the pain began, but it was nothing strenuous.  She did not have any falls.     Patient states when the pain first began she felt it in the left anterior thigh.  She states that now it seems to settle that the left knee, especially the lateral aspect of the left knee.  Occasionally she feels the pain extend on the left anterior shin.  It does not reach the foot.  She states that she also has some pain in the left groin.  She states that she is not currently having any significant back pain, but she thinks there was some left-sided back pain when the pain first began.  Patient describes the pain as feeling like nerve pain.  She states it feels similar to the pain that she had previously in the right leg.  Has a burning quality to it.  She rates her pain today as a 4 out of 10.  At its worst it is an 8 of 10.  At its best it is a 2 out of 10.  Pain is aggravated with walking and standing and alleviated with rest.    Patient has had 4 sessions of physical therapy.  She does her home exercises.  She is using aspirin 650 mg twice daily which is helpful.  She  "did not tolerate gabapentin.  It made her feel \"loopy.\"    Past medical history is reviewed and is unchanged.    Review of Systems:    Negative for numbness/[place], weakness, loss of bowel/bladder control, inability to urinate, headache, difficulty swallowing, difficulty with hand skills, fevers, unintentional weight loss, pain worse at night.     Objective:   CONSTITUTIONAL:  Vital signs as above.  No acute distress.  The patient is well nourished and well groomed.    PSYCHIATRIC:  The patient is awake, alert, oriented to person, place and time.  The patient is answering questions appropriately with clear speech.  Normal affect.  HEENT: Normocephalic, atraumatic.  Sclera clear.    SKIN:  Skin over the face, posterior torso, bilateral upper and lower extremities is clean, dry, intact without rashes.  MUSCULOSKELETAL:  Gait is mildly antalgic, favoring the left.  Mild tenderness over the left lower lumbar paraspinous muscles.  Mild tenderness palpation left sacroiliac joint.  No tenderness palpation left greater trochanter.  Mild tenderness palpation left lateral knee.  Mild tenderness palpation left medial groin.   The patient has 5/5 strength for the bilateral hip flexors, knee flexors/extensors, ankle dorsiflexors/plantar flexors, ankle evertors/invertors.  Internal and external rotation of left hip are both mildly restricted with reproduction of left groin pain.  Straight leg raise on the left causes groin pain, but not leg pain.  NEUROLOGICAL: Reflexes are 2+ left and 1+ right patella, 2+ bilateral Achilles.  No ankle clonus bilaterally.  Sensation to light touch is intact in the bilateral L4, L5, and S1 dermatomes.       RESULTS: I reviewed the MRI lumbar spine from [facility] dated July 31, 2019.  This shows mild grade 1 degenerative spondylolisthesis L4 and L5 resulting in mild central canal stenosis and bilateral lateral recess stenosis.  There is also minimal central canal stenosis at L3-4.  Patient has " facet arthropathy throughout the lumbar spine which is advanced at L4-5, moderate at L3-4, and mild elsewhere.  Please see report for further details.

## 2021-06-02 NOTE — PROGRESS NOTES
[facility] Certification Request    November 1, 2019      Patient: Heather Blakely  MR Number: [id number]  YOB: 1950  Date of Visit: 11/1/2019      Dear Dr. Linda, Nathaly Sen PA-C:    Thank you for this referral.   We are seeing Heather Blakely in Physical Therapy for hip pain.    Medicare and/or Medicaid requires physician review and approval of the treatment plan. Please review the plan of care and verify that you agree with the therapy plan of care by co-signing this note.      Plan of Care  Authorization / Certification Start Date: 11/01/19  Authorization / Certification End Date: 01/30/20  Authorization / Certification Number of Visits: 12  Communication with: Referral Source  Patient Related Instruction: Nature of Condition;Treatment plan and rationale;Self Care instruction;Basis of treatment;Body mechanics;Posture  Times per Week: 1-2  Number of Weeks: 6-12  Number of Visits: 6-12  Discharge Planning: Patient will be discharged when independent in self-management of condition or when goals are met.  Precautions / Restrictions : Spondylolisthesis?  Therapeutic Exercise: ROM;Stretching;Strengthening  Neuromuscular Reeducation: kinesio tape;posture;core  Manual Therapy: soft tissue mobilization;myofascial release;muscle energy;joint mobilization;strain counterstrain  Modalities: electrical stimulation;ultrasound      Goals:  Pt. will be independent with home exercise program in : 6 weeks  Pt will: be able to walk through the grocery store without pain; in 8 weeks  Pt will: be able to maneuver stairs without pain; in 8 weeks        If you have any questions or concerns, please don't hesitate to call.    Sincerely,      Mariam Freeman, PT, DPT        Physician recommendation:     ___ Follow therapist's recommendation        ___ Modify therapy      *Physician co-signature indicates they certify the need for these services furnished within this plan and while under their  [facility]   Hip Initial Evaluation    Patient Name: Heather Blakely  Date of evaluation: 11/1/2019  Referral Diagnosis:   M25.552 (ICD-10-CM) - Hip pain, left   M54.16 (ICD-10-CM) - Lumbar radicular pain   Referring provider: Nathaly Linda PA-C  Visit Diagnosis:     ICD-10-CM    1. Acute hip pain, left M25.552    2. Muscle weakness (generalized) M62.81        Assessment:        Heather Blakely is a 69 y.o. female who presents to therapy today with chief complaints of L hip pain. Onset date of sx was 08/2019.  Pt reported h/o hyperlipidemia, CHF, and R TKA.  Pain symptoms are sharp and radiate into the anterior thigh and knee.  Functional impairments include walking, sitting, stairs, lying on L side.  Pt demo's signs and sx consistent with L hip impingement syndrome.   Pt. is appropriate for skilled PT intervention as outlined in the Plan of Care (POC).  Pt. is a good candidate for skilled PT services to improve pain levels and function.    Goals:  Pt. will be independent with home exercise program in : 6 weeks  Pt will: be able to walk through the grocery store without pain; in 8 weeks  Pt will: be able to maneuver stairs without pain; in 8 weeks      Patient's expectations/goals are realistic.    Barriers to Learning or Achieving Goals:  No Barriers.       Plan / Patient Instructions:        Plan of Care:   Authorization / Certification Start Date: 11/01/19  Authorization / Certification End Date: 01/30/20  Authorization / Certification Number of Visits: 12  Communication with: Referral Source  Patient Related Instruction: Nature of Condition;Treatment plan and rationale;Self Care instruction;Basis of treatment;Body mechanics;Posture  Times per Week: 1-2  Number of Weeks: 6-12  Number of Visits: 6-12  Discharge Planning: Patient will be discharged when independent in self-management of condition or when goals are met.  Precautions / Restrictions :  Spondylolisthesis?  Therapeutic Exercise: ROM;Stretching;Strengthening  Neuromuscular Reeducation: kinesio tape;posture;core  Manual Therapy: soft tissue mobilization;myofascial release;muscle energy;joint mobilization;strain counterstrain  Modalities: electrical stimulation;ultrasound      POC and pathology of condition were reviewed with patient.  Pt. is in agreement with the Plan of Care  A Home Exercise Program (HEP) was initiated today.  Pt. was instructed in exercises by PT and patient was given a handout with detailed instructions.    Plan for next visit: Consider S/L lumbar mobilizations to L side if hip mobilizations not helpful.  Core and hip strengthening.     Subjective:      The patient reports that she has had hip issues for a while on and off.  [place] 5 [place] and it really started flaring up.      Social information:   Living Situation:[facility]   Occupation:retired   Work Status:NA   Equipment Available: SE cane    Pain Ratin  Pain rating at best: 2  Pain rating at worst: 8  Pain description: sharp, radiating into the thigh and knee    Functional limitations are described as occurring with:   walking, sitting, stairs, lying on L side    Patient reports benefit from:  rest  , pain medication       Objective:      Note: Items left blank indicates the item was not performed or not indicated at the time of the evaluation.    Patient Outcome Measures :    Lower Extremity Functional Scale (_/80): 29     Scores range from 0-80, where a score of 80 represents maximum function. The minimal clinically important difference is a positive change of 9 points.    Hip Examination  1. Acute hip pain, left     2. Muscle weakness (generalized)       Precautions/Restrictions: None  Involved Side: Left  Posture Observation:      General standing posture is fair.  Lumbopelvic complex: Moderately increased lumbar lordosis  Assistive Device: None  Gait Observation: Antalgic gait  L  Lumbar Clearing: Symptoms reproduced with  Lumbar Extension and Lumbar Side bending to L    Hip ROM:    Date: 11/01/19     Hip ROM( ) AROM in degrees AROM in degrees AROM in degrees    Right Left Right Left Right Left   Hip Flexion (0-120 ) WFL Min limited Pain       Hip Abduction (0-45 )         Hip External Rotation (0-50 ) WFL Mod limited Pain       Hip Internal Rotation (0-40 ) WFL Mod limited Pain       Hip Extension (0-15 )          PROM in degrees PROM in degrees PROM in degrees    Right Left Right Left Right Left   Hip Flexion (0-120 )         Hip Abduction (0-45 )         Hip External Rotation (0-50 )         Hip Internal Rotation (0-40 )         Hip Extension (0-15 )           Hip/Knee Strength   Tested in supine  Date: 11/01/19     Hip/Knee Strength (/5) MMT MMT MMT    Right Left Right Left Right Left   Hip Flexion 4+ 4       Hip Abduction 4+ 4       Hip Adduction 4+ 4+       Hip Extension         Hip External Rotation 4+ 4       Hip Internal Rotation 4+ 4       Knee Extension         Knee Flexion           Ankle/Foot Strength (/5) MMT MMT MMT    Right Left Right Left Right Left   Dorsiflexion            Flexibility: Fair  Palpation: Tenderness at femoral triangle L and hip abductor mm.    Hip Special Tests     OA Right (+/-) Left (+/-) Intra Articular Right (+/-) Left (+/-)   Hip Scour - - NEHEMIAH - +   Test Cluster  -Hip pain  -Hip IR <15   -Hip Flex <115    FADIR - +   Test Cluster  -Painful Hip IR  ->50 years old  -Morning Stiffness <60 min   Passive Supine Rotation Test     Misc. Right (+/-) Left (+/-) Stinchfield Test (SLR Against Resistance)     Cecelia fragoso   DIRI     Trendelenburg   Posterior Rim Impingement     SIJ Right (+/-) Left (+/-) Lateral Rim Impingement     SIJ Compression   Other     SIJ Distraction   Other     POSH Test   Other     Sacral Thrust   Other       Appt time: 10:03AM - 11:03AM    Treatment Today     TREATMENT MINUTES COMMENTS   Evaluation 33 -Low complexity hip  "evaluation  -Patient educated on pathology  -Discussed POC   Self-care/ Home management     Manual therapy 12 -Supine posterior hip mobilizations with patient in 90-90 position grades II-III x 30 x 5  -Supine longitudinal hip mobilizations grades II-III x 30 x 5   Neuromuscular Re-education     Therapeutic Activity     Therapeutic Exercises 15 -Demo/performance of HEP  Exercise #1: Femoral nn slider  Comment #1: x 10  Exercise #2: Supine hip abduction  Comment #2: L2 band x 10  Exercise #3: S/L hip abduction  Comment #3: x 10  Exercise #4: Bridging  Comment #4: x 10 with 5\" hold     Gait training     Modality__________________                Total 60    Blank areas are intentional and mean the treatment did not include these items.     PT Evaluation Code: (Please list factors)  Patient History/Comorbidities: hyperlipidemia, CHF  Examination: Impaired hip ROM  Clinical Presentation: Stable  Clinical Decision Making: Low complexity    Patient History/  Comorbidities Examination  (body structures and functions, activity limitations, and/or participation restrictions) Clinical Presentation Clinical Decision Making (Complexity)   No documented Comorbidities or personal factors 1-2 Elements Stable and/or uncomplicated Low   1-2 documented comorbidities or personal factor 3 Elements Evolving clinical presentation with changing characteristics Moderate   3-4 documented comorbidities or personal factors 4 or more Unstable and unpredictable High                Mariam Freeman, PT, DPT  11/1/2019  11:18 AM                  "

## 2021-06-02 NOTE — TELEPHONE ENCOUNTER
----- Message from Nathaly Linda PA-C sent at 10/21/2019 10:14 AM CDT -----  Please call the patient let her know that I reviewed her x-ray left hip.  This does show severe degenerative change of the left hip.  This is likely the source of her groin pain and could cause pain radiating down the thigh.  I do think it would be good for the patient to begin physical therapy.  Hopefully the prednisone has helped with her pain.  Patient should follow-up with me in about a week and a half, as discussed that her last visit.  If groin pain fails to improve, I would recommend a left intra-articular hip joint injection.  If that also fails to provide relief, recommend referral to orthopedics.

## 2021-06-03 VITALS — WEIGHT: 233.6 LBS | HEIGHT: 63 IN | BODY MASS INDEX: 41.39 KG/M2

## 2021-06-03 VITALS — WEIGHT: 234 LBS | BODY MASS INDEX: 41.46 KG/M2 | HEIGHT: 63 IN

## 2021-06-03 VITALS
TEMPERATURE: 98.2 F | DIASTOLIC BLOOD PRESSURE: 82 MMHG | WEIGHT: 241 LBS | RESPIRATION RATE: 12 BRPM | HEART RATE: 80 BPM | SYSTOLIC BLOOD PRESSURE: 126 MMHG | BODY MASS INDEX: 42.69 KG/M2

## 2021-06-03 VITALS — WEIGHT: 235.25 LBS | HEIGHT: 63 IN | BODY MASS INDEX: 41.68 KG/M2

## 2021-06-03 VITALS — BODY MASS INDEX: 41.45 KG/M2 | WEIGHT: 234 LBS

## 2021-06-03 VITALS — HEIGHT: 63 IN | WEIGHT: 234 LBS | BODY MASS INDEX: 41.46 KG/M2

## 2021-06-03 VITALS — WEIGHT: 234 LBS | BODY MASS INDEX: 41.45 KG/M2

## 2021-06-03 NOTE — PROGRESS NOTES
Preoperative Exam    Scheduled Procedure: cataract  Surgery Date:  12/2/19  Surgery Location: [facility] fax: [phone]    Surgeon:  Dr. Gambino    Assessment/Plan:     1. Preoperative examination    2. Age-related cataract of both eyes, unspecified age-related cataract type    3. Benign essential hypertension  - Basic Metabolic Panel    4. Immunization due  UTD with influenza  - Pneumococcal polysaccharide vaccine 23-valent 3 yo or older, subq/IM    5. Gastroesophageal reflux disease without esophagitis  - famotidine (PEPCID) 20 MG tablet; Take 1 tablet (20 mg total) by mouth 2 (two) times a day.  Dispense: 60 tablet; Refill: 3     Surgical Procedure Risk: Low (reported cardiac risk generally < 1%)  Have you had prior anesthesia?: Yes  Have you or any family members had a previous anesthesia reaction:  Yes: nausea  Do you or any family members have a history of a clotting or bleeding disorder?: No  Cardiac Risk Assessment: no increased risk for major cardiac complications    APPROVAL GIVEN to proceed with proposed procedure, without further diagnostic evaluation      Functional Status: Independent  Patient plans to recover at home with family.     Subjective:      Heather Blakely is a 69 y.o. female who presents for a preoperative consultation.  The patient has been dealing with bilateral cataracts for several years but there are to the point where she needs surgical intervention.    In addition to the indication for the surgery the patient also has a history of hypertension which is well controlled with a low-dose of lisinopril and carvedilol.    All other systems reviewed and are negative, other than those listed in the HPI.    Pertinent History  Do you have difficulty breathing or chest pain after walking up a flight of stairs: No  History of obstructive sleep apnea: Yes  Steroid use in the last 6 months: No  Frequent Aspirin/NSAID use: Yes    Prior Blood Transfusion: No  Prior Blood Transfusion  "Reaction: No  If for some reason prior to, during or after the procedure, if it is medically indicated, would you be willing to have a blood transfusion?:  There is no transfusion refusal.    Current Outpatient Medications   Medication Sig Dispense Refill     carvedilol (COREG) 3.125 MG tablet Take 3.125 mg by mouth daily.        cholecalciferol, vitamin D3, 2,000 unit Tab Take 4,000 Units by mouth.       ibuprofen 200 mg cap Take by mouth. 2 tabs 2-4 times a day       lisinopril (PRINIVIL,ZESTRIL) 2.5 MG tablet Take 2.5 mg by mouth daily.       ranitidine (ZANTAC) 150 MG tablet Take 1 tablet (150 mg total) by mouth 2 (two) times a day. 60 tablet 11     famotidine (PEPCID) 20 MG tablet Take 1 tablet (20 mg total) by mouth 2 (two) times a day. 60 tablet 3     No current facility-administered medications for this visit.         Allergies   Allergen Reactions     Sulfa (Sulfonamide Antibiotics)      Since a child       Patient Active Problem List   Diagnosis     Chest Tightness Or Heavy Pressure     Vitamin D Deficiency     Hyperlipidemia     Left Bundle Branch Block     Congestive Heart Failure     Esophageal Reflux     Diffuse Joint Pains (Arthralgias)     Fatigue       No past medical history on file.    Past Surgical History:   Procedure Laterality Date     KS ARTHROPLASTY TIBIAL PLATEAU      Description: Knee Replacement;  Recorded: 06/25/2013;  Comments: 1969 = \"reconstruction\" per pt     KS LIGATE FALLOPIAN TUBE      Description: Tubal Ligation;  Recorded: 06/25/2013;     KS REMOVAL GALLBLADDER      Description: Cholecystectomy;  Proc Date: 01/01/2000;       Social History     Socioeconomic History     Marital status:      Spouse name: Not on file     Number of children: Not on file     Years of education: Not on file     Highest education level: Not on file   Occupational History     Not on file   Social Needs     Financial resource strain: Not on file     Food insecurity:     Worry: Not on file     " Inability: Not on file     Transportation needs:     Medical: Not on file     Non-medical: Not on file   Tobacco Use     Smoking status: Former Smoker     Smokeless tobacco: Never Used     Tobacco comment: Quit in 1975   Substance and Sexual Activity     Alcohol use: No     Drug use: No     Sexual activity: Not on file   Lifestyle     Physical activity:     Days per week: Not on file     Minutes per session: Not on file     Stress: Not on file   Relationships     Social connections:     Talks on phone: Not on file     Gets together: Not on file     Attends Restorationism service: Not on file     Active member of club or organization: Not on file     Attends meetings of clubs or organizations: Not on file     Relationship status: Not on file     Intimate partner violence:     Fear of current or ex partner: Not on file     Emotionally abused: Not on file     Physically abused: Not on file     Forced sexual activity: Not on file   Other Topics Concern     Not on file   Social History Narrative     Not on file       Patient Care Team:  Carrie Delgado MD as PCP - General (Family Medicine)  Carrie Delgado MD as Assigned PCP          Objective:     Vitals:    11/11/19 1251   BP: 126/82   Pulse: 80   Resp: 12   Temp: 98.2  F (36.8  C)   TempSrc: Oral   Weight: (!) 241 lb (109.3 kg)         Physical Exam:  Objective:  Vital signs reviewed and stable  General: No acute distress  Psych: Appropriate affect  HEENT: moist mucous membranes  Lymph: no cervical or supraclavicular lymphadenopathy  Cardiovascular: regular rate and rhythm with no murmur  Pulmonary: clear to auscultation bilaterally with no wheeze  Abdomen: soft, non tender, non distended with normo-active bowel sounds  Extremities: warm and well perfused with no edema  Skin: warm and dry with no rash      There are no Patient Instructions on file for this visit.    Labs:  No labs were ordered during this visit    Immunization History   Administered Date(s)  Administered     Influenza high dose,seasonal,PF, 65+ yrs 10/12/2016, 10/11/2017, 10/10/2018, 10/03/2019     Influenza, seasonal,quad inj 6-35 mos 10/28/2010     Influenza,seasonal quad, PF 12/30/2013     Influenza,seasonal, Inj IIV3 10/27/2008, 09/08/2009     Pneumo Conj 13-V (2010&after) 11/28/2016     Pneumo Polysac 23-V 11/11/2019     Td, Adult, Absorbed 06/24/2000     Tdap 08/23/2010     ZOSTER, LIVE 06/25/2013           Electronically signed by Carrie Delgado MD 11/11/19 12:53 PM

## 2021-06-03 NOTE — PROGRESS NOTES
[facility] Daily Progress     Patient Name: Heather Blakely  Date: 2019  Visit #: 3  PTA visit #:  NA  Referral Diagnosis: Spondylolisthesis of lumbar region  Referring provider: Nathaly Linda PA-C  Visit Diagnosis:     ICD-10-CM    1. Acute hip pain, left M25.552    2. Muscle weakness (generalized) M62.81      Heather Blakely is a 69 y.o. female who presents to therapy today with chief complaints of L hip pain. Onset date of sx was 2019.  Pt reported h/o hyperlipidemia, CHF, and R TKA.  Pain symptoms are sharp and radiate into the anterior thigh and knee.  Functional impairments include walking, sitting, stairs, lying on L side.  Pt demo's signs and sx consistent with L hip impingement syndrome.     Precautions / Restrictions : Spondylolisthesis?      Assessment:     HEP/POC compliance is  good .  Response to Intervention Slight soreness after MT completed today- added lumbar mobilizations which reproduced pain.   Patient is benefitting from skilled physical therapy and is making steady progress toward functional goals.  Patient is appropriate to continue with skilled physical therapy intervention, as indicated by initial plan of care.    Goal Status:  Pt. will be independent with home exercise program in : 6 weeks    Pt will: be able to walk through the grocery store without pain; in 8 weeks  Pt will: be able to maneuver stairs without pain; in 8 weeks      Plan / Patient Education:     Continue with initial plan of care.  Progress with home program as tolerated.  Contine with hip mobilizations if helpful.  Assess response to gentle lumbar mobilizations.  Progress hip strengthening.    Subjective:     Pain Ratin L hip  Pt reports things are about the same. She is getting less cramping- previous was continuous. She is still getting a lot of nerve pain. She is getting to the exercises daily but finds it hard to get her leg into the anterior hip capsule stretch position.  "    Objective:     Pain with hip flexion, ER, and IR ROM L hip.  Tender with L posterior hip with lumbar mobilizations    Exercise #1: Femoral nn slider  Comment #1: x 10  Exercise #2: Supine hip abduction  Comment #2: L2 band x 10; cues to count to 5 on the way in and out  Exercise #3: S/L hip abduction  Comment #3: x 10; cues for leg positioning  Exercise #4: Bridging  Comment #4: x 10 with 5\" hold  Exercise #5: anterior hip capsule stretch  Comment #5: hold 30 sec, 2x, vc to get into position easier and use towel roll to reach knee       Treatment Today     TREATMENT MINUTES COMMENTS   Evaluation     Self-care/ Home management     Manual therapy 14 -Supine posterior hip mobilizations with patient in 90-90 position grades II-III x 30 x 5  -Supine longitudinal hip mobilizations grades II-III x 30 x 5  -L S/L R lumbar rotational mobilizations, grade I-II   Neuromuscular Re-education     Therapeutic Activity     Therapeutic Exercises 10 -NUSTEP x 5 minutes WL 5.0; subjective measures taken  -See flow sheet   Gait training     Modality__________________                Total 24    Blank areas are intentional and mean the treatment did not include these items.       Linda Storey, PT, DPT  11/12/2019    "

## 2021-06-03 NOTE — PROGRESS NOTES
[facility] Daily Progress     Patient Name: Heather Blakely  Date: 2019  Visit #: 2  PTA visit #:  NA  Referral Diagnosis: Spondylolisthesis of lumbar region  Referring provider: Nathaly Linda PA-C  Visit Diagnosis:     ICD-10-CM    1. Acute hip pain, left M25.552    2. Muscle weakness (generalized) M62.81      Heather Blakely is a 69 y.o. female who presents to therapy today with chief complaints of L hip pain. Onset date of sx was 2019.  Pt reported h/o hyperlipidemia, CHF, and R TKA.  Pain symptoms are sharp and radiate into the anterior thigh and knee.  Functional impairments include walking, sitting, stairs, lying on L side.  Pt demo's signs and sx consistent with L hip impingement syndrome.     Precautions / Restrictions : Spondylolisthesis?      Assessment:     HEP/POC compliance is  good .  The patient demonstrated improvements in her hip ROM after treatment today.  She has a good understanding of her HEP and is appropriate to continue with skilled PT services at this time.  She will be seen in PT two more sessions and then will be undergoing cataract surgery x 2.  Depending on recommendations by surgeon, she may need to take a break from PT at that time.  She will also be contacting Dr. Coker to determine if any additional intervention is needed for her hip.    Goal Status:  Pt. will be independent with home exercise program in : 6 weeks    Pt will: be able to walk through the grocery store without pain; in 8 weeks  Pt will: be able to maneuver stairs without pain; in 8 weeks      Plan / Patient Education:     Continue with initial plan of care.  Progress with home program as tolerated.  Contine with hip mobilizations if helpful.  Consider gentle lumbar mobilizations.  Progress hip strengthening.    Subjective:     Pain Ratin  The patient reports that the following day her pain was much better.  She was not sore from the treatment.  Her hip was the best it felt in a  "long time.  She was walking a lot yesterday and is having more hip pain today.    Objective:     Pain with hip flexion, ER, and IR ROM L hip.    Exercise #1: Femoral nn slider  Comment #1: x 10  Exercise #2: Supine hip abduction  Comment #2: L2 band x 10; cues to count to 5 on the way in and out  Exercise #3: S/L hip abduction  Comment #3: x 10; cues for leg positioning  Exercise #4: Bridging  Comment #4: x 10 with 5\" hold  Exercise #5: Anterior hip capsule stretch  Comment #5: Hold 30 seconds    Appt time: 7:00AM - 7:29AM    Treatment Today     TREATMENT MINUTES COMMENTS   Evaluation     Self-care/ Home management     Manual therapy 12 -Supine posterior hip mobilizations with patient in 90-90 position grades II-III x 30 x 5  -Supine longitudinal hip mobilizations grades II-III x 30 x 5   Neuromuscular Re-education     Therapeutic Activity     Therapeutic Exercises 17 -NUSTEP x 5 minutes WL 5.0; subjective measures taken  -See flow sheet   Gait training     Modality__________________                Total 29    Blank areas are intentional and mean the treatment did not include these items.       Mariam Freeman, PT, DPT  11/5/2019      "

## 2021-06-03 NOTE — PROGRESS NOTES
[facility] Rehabilitation Daily Progress     Patient Name: Heather Blakely  Date: 2019  Visit #: 4  PTA visit #:  NA  Referral Diagnosis: Spondylolisthesis of lumbar region  Referring provider: Nathaly Linda PA-C  Visit Diagnosis:     ICD-10-CM    1. Acute hip pain, left M25.552    2. Muscle weakness (generalized) M62.81      Heather Blakely is a 69 y.o. female who presents to therapy today with chief complaints of L hip pain. Onset date of sx was 2019.  Pt reported h/o hyperlipidemia, CHF, and R TKA.  Pain symptoms are sharp and radiate into the anterior thigh and knee.  Functional impairments include walking, sitting, stairs, lying on L side.  Pt demo's signs and sx consistent with L hip impingement syndrome.     Precautions / Restrictions : Spondylolisthesis?      Assessment:     HEP/POC compliance is  good .  Response to Intervention Increased pain today with limited tolerance to MT today.  Patient is benefitting from skilled physical therapy and is making steady progress toward functional goals.  Patient is appropriate to continue with skilled physical therapy intervention, as indicated by initial plan of care.    Goal Status:  Pt. will be independent with home exercise program in : 6 weeks    Pt will: be able to walk through the grocery store without pain; in 8 weeks  Pt will: be able to maneuver stairs without pain; in 8 weeks      Plan / Patient Education:     Continue with initial plan of care.  Progress with home program as tolerated.  Pt to follow up in December after surgeries and follow up with Dr. Coker if needed.    Subjective:     Pain Ratin L hip  Pt reports she was sore after her last PT appt. She was pushing around chairs/furniture last night which increased her pain.    Objective:     Pain with hip flexion, ER, and IR ROM L hip.  Tender and hypertonicity along L hip flexors and adductors      Exercise #1: Femoral nn slider  Comment #1: 2x 15 reps on L  Exercise #2:  "Supine hip abduction  Comment #2: L2 band x 10; cues to count to 5 on the way in and out  Exercise #3: S/L hip abduction  Comment #3: x 10; cues for leg positioning  Exercise #4: Bridging  Comment #4: x 10 with 5\" hold  Exercise #5: anterior hip capsule  Comment #5: DC as it is still painful at this time  Exercise #6: KEREN 3 x 10-15 sec holds  Comment #6: LTR to the R, 3 x 10 sec holds, did not tolerate hip adductors or supine hip flexor stretch due to pain      Treatment Today     TREATMENT MINUTES COMMENTS   Evaluation     Self-care/ Home management     Manual therapy 12 -Supine posterior hip mobilizations with patient in 90-90 position grades II-III - did not tolerate  -Supine longitudinal hip mobilizations grades II-III x 10-30 x 8  -supine STM to L hip flexors and adductors- limited tolerance due to pain   Neuromuscular Re-education     Therapeutic Activity     Therapeutic Exercises 14 -NUSTEP x 5 minutes WL 5.0- not today; subjective measures taken  -See flow sheet   Gait training     Modality__________________                Total 26    Blank areas are intentional and mean the treatment did not include these items.       Linda Storey, PT, DPT  11/14/2019     [facility] Rehabilitation Discharge Summary    Patient was seen for 4 visits from 11/1/19 to 11/14/19 with 0 missed appointments.  The patient attended therapy initially, but did not finish the therapy sessions prescribed.  Goals were not fully achieved. Explanation for goals not achieved: Goals not met as pt did not rerturn to PT.  Patient received a home program .  The patient discontinued therapy, did not return.    Therapy will be discontinued at this time.  The patient will need a new referral to resume.    Thank you for your referral.  Linda Storey, PT, DPT, CLT  1/29/2020  9:03 AM    "

## 2021-06-04 VITALS
RESPIRATION RATE: 16 BRPM | TEMPERATURE: 98 F | DIASTOLIC BLOOD PRESSURE: 62 MMHG | WEIGHT: 230 LBS | BODY MASS INDEX: 40.75 KG/M2 | SYSTOLIC BLOOD PRESSURE: 128 MMHG | HEART RATE: 72 BPM | HEIGHT: 63 IN

## 2021-06-06 NOTE — TELEPHONE ENCOUNTER
Spoke to pt and informed her of the message below per EB. Pt confirmed understanding that Rx's were sent to the pharmacy for P/U. Completing task.

## 2021-06-06 NOTE — PROGRESS NOTES
Preoperative Exam    Scheduled Procedure: Bilateral Eyelid Lift  Surgery Date:  3/23/20  Surgery Location: [facility], [place], fax [phone]    Surgeon:  Dr. Verona Gambino    Assessment/Plan:     1. Preoperative examination    2. Blepharitis of upper eyelids of both eyes, unspecified type     Will hold lisinopril the morning of surgery    Surgical Procedure Risk: Low (reported cardiac risk generally < 1%)  Have you had prior anesthesia?: Yes  Have you or any family members had a previous anesthesia reaction:  No  Do you or any family members have a history of a clotting or bleeding disorder?: No  Cardiac Risk Assessment: no increased risk for major cardiac complications    APPROVAL GIVEN to proceed with proposed procedure, without further diagnostic evaluation      Functional Status: Independent  Patient plans to recover at home with family.     Subjective:      Heather Blakely is a 70 y.o. female who presents for a preoperative consultation.  She will be having a levator advancement with blepharoplasty bilateral upper lids.    She has been having issues with her upper eyelids for the last 4 years.    In addition to the indication for surgery patient has a past medical history significant for hypertension currently taking lisinopril and carvedilol.    All other systems reviewed and are negative, other than those listed in the HPI.    Pertinent History  Do you have difficulty breathing or chest pain after walking up a flight of stairs: No  History of obstructive sleep apnea: Yes: Has mild sleep apnea  Steroid use in the last 6 months: No  Frequent Aspirin/NSAID use: No  Prior Blood Transfusion: No  Prior Blood Transfusion Reaction: No  If for some reason prior to, during or after the procedure, if it is medically indicated, would you be willing to have a blood transfusion?:  There is no transfusion refusal.    Current Outpatient Medications   Medication Sig Dispense Refill     carvedilol (COREG) 3.125  "MG tablet Take 3.125 mg by mouth daily.        cholecalciferol, vitamin D3, 2,000 unit Tab Take 4,000 Units by mouth.       ibuprofen 200 mg cap Take by mouth. 2 tabs 2-4 times a day       lisinopril (PRINIVIL,ZESTRIL) 2.5 MG tablet Take 2.5 mg by mouth daily.       No current facility-administered medications for this visit.         Allergies   Allergen Reactions     Sulfa (Sulfonamide Antibiotics)      Since a child       Patient Active Problem List   Diagnosis     Chest Tightness Or Heavy Pressure     Vitamin D Deficiency     Hyperlipidemia     Left Bundle Branch Block     Congestive Heart Failure     Esophageal Reflux     Diffuse Joint Pains (Arthralgias)     Fatigue       No past medical history on file.    Past Surgical History:   Procedure Laterality Date     CA ARTHROPLASTY TIBIAL PLATEAU      Description: Knee Replacement;  Recorded: 06/25/2013;  Comments: 1969 = \"reconstruction\" per pt     CA LIGATE FALLOPIAN TUBE      Description: Tubal Ligation;  Recorded: 06/25/2013;     CA REMOVAL GALLBLADDER      Description: Cholecystectomy;  Proc Date: 01/01/2000;       Social History     Socioeconomic History     Marital status:      Spouse name: Not on file     Number of children: Not on file     Years of education: Not on file     Highest education level: Not on file   Occupational History     Not on file   Social Needs     Financial resource strain: Not on file     Food insecurity:     Worry: Not on file     Inability: Not on file     Transportation needs:     Medical: Not on file     Non-medical: Not on file   Tobacco Use     Smoking status: Former Smoker     Smokeless tobacco: Never Used     Tobacco comment: Quit in 1975   Substance and Sexual Activity     Alcohol use: No     Drug use: No     Sexual activity: Not on file   Lifestyle     Physical activity:     Days per week: Not on file     Minutes per session: Not on file     Stress: Not on file   Relationships     Social connections:     Talks on " "phone: Not on file     Gets together: Not on file     Attends [employer] service: Not on file     Active member of club or organization: Not on file     Attends meetings of clubs or organizations: Not on file     Relationship status: Not on file     Intimate partner violence:     Fear of current or ex partner: Not on file     Emotionally abused: Not on file     Physically abused: Not on file     Forced sexual activity: Not on file   Other Topics Concern     Not on file   Social History Narrative     Not on file             Objective:     Vitals:    03/06/20 0911   BP: 128/62   Pulse: 72   Resp: 16   Temp: 98  F (36.7  C)   TempSrc: Oral   Weight: (!) 230 lb (104.3 kg)   Height: 5' 3\" (1.6 m)         Physical Exam:  Objective:  Vital signs reviewed and stable  General: No acute distress  Psych: Appropriate affect  HEENT: moist mucous membranes, pupils equal, round, reactive to light and accomodation, posterior oropharynx clear of erythema or exudate, tympanic membranes are pearly grey bilaterally  Lymph: no cervical or supraclavicular lymphadenopathy  Cardiovascular: regular rate and rhythm with no murmur  Pulmonary: clear to auscultation bilaterally with no wheeze  Abdomen: soft, non tender, non distended with normo-active bowel sounds  Extremities: warm and well perfused with no edema  Skin: warm and dry with no rash      There are no Patient Instructions on file for this visit.      Labs:  No labs were ordered during this visit    Immunization History   Administered Date(s) Administered     Influenza high dose,seasonal,PF, 65+ yrs 10/12/2016, 10/11/2017, 10/10/2018, 10/03/2019     Influenza, seasonal,quad inj 6-35 mos 10/28/2010     Influenza,seasonal quad, PF 12/30/2013     Influenza,seasonal, Inj IIV3 10/27/2008, 09/08/2009     Pneumo Conj 13-V (2010&after) 11/28/2016     Pneumo Polysac 23-V 11/11/2019     Td, Adult, Absorbed 06/24/2000     Tdap 08/23/2010     ZOSTER, LIVE 06/25/2013           Electronically " signed by Carrie Delgado MD 03/06/20 9:13 AM

## 2021-06-06 NOTE — TELEPHONE ENCOUNTER
EB- Please advise on pt's refill request during BB's absence from clinic. Rx's prepped for approval if ok. Thank you.

## 2021-06-06 NOTE — TELEPHONE ENCOUNTER
Medication Request  Medication name:    Disp  Refills  Start  End     carvedilol (COREG) 3.125 MG tablet         Sig - Route: Take 3.125 mg by mouth daily.  - Oral     Class: Historical Med        Disp  Refills  Start  End     lisinopril (PRINIVIL,ZESTRIL) 2.5 MG tablet         Sig - Route: Take 2.5 mg by mouth daily. - Oral     Class: Historical Med         Requested Pharmacy: [id number]  Reason for request: needing refills   When did you use medication last?:    Patient offered appointment:  yes  Okay to leave a detailed message: yes

## 2021-06-06 NOTE — PROGRESS NOTES
[facility] Discharge Summary  Patient Name: Heather Blakely  Date: 3/2/2020  Referral Diagnosis:Spondylolisthesis  Referring provider: Nathaly Linda  Visit Diagnosis:   1. Spondylolisthesis of lumbar region     2. Right lumbar radiculopathy     3. Generalized muscle weakness         Goals: was not reassessed for discharge    Patient was seen for 4 visits.  The patient discontinued therapy, did not return.  The patient was issued a HEP and instructed in proper usage.    Therapy will be discontinued at this time.  The patient will need a new referral to resume.    Thank you for your referral.  Fox Duque, PT, DPT  3/2/2020  8:32 AM

## 2021-06-06 NOTE — TELEPHONE ENCOUNTER
1. Congestive heart failure, unspecified HF chronicity, unspecified heart failure type (H)  - lisinopriL (PRINIVIL,ZESTRIL) 2.5 MG tablet; Take 1 tablet (2.5 mg total) by mouth daily.  Dispense: 90 tablet; Refill: 1  - carvediloL (COREG) 3.125 MG tablet; Take 1 tablet (3.125 mg total) by mouth daily.  Dispense: 90 tablet; Refill: 1     Refills sent to pharmacy, 6 month supply. Due for annual physical with PCP in September.     Tammy Stout DO

## 2021-06-11 NOTE — TELEPHONE ENCOUNTER
Refill Approved    Rx renewed per Medication Renewal Policy. Medication was last renewed on 3/10/20.    Verona Rinaldi, [facility] [place] Connection Triage/Med Refill 9/3/2020     Requested Prescriptions   Pending Prescriptions Disp Refills     carvediloL (COREG) 3.125 MG tablet [Pharmacy Med Name: CARVEDILOL 3.125 MG TABLET] 90 tablet 1     Sig: TAKE 1 TABLET (3.125 MG TOTAL) BY MOUTH DAILY.       Beta-Blockers Refill Protocol Passed - 9/1/2020 12:44 AM        Passed - PCP or prescribing provider visit in past 12 months or next 3 months     Last office visit with prescriber/PCP: 2/21/2019 Tammy Stout DO OR same dept: Visit date not found OR same specialty: 6/26/2019 Carrie Delgado MD  Last physical: Visit date not found Last MTM visit: Visit date not found   Next visit within 3 mo: Visit date not found  Next physical within 3 mo: Visit date not found  Prescriber OR PCP: Tammy Stout DO  Last diagnosis associated with med order: 1. Congestive heart failure, unspecified HF chronicity, unspecified heart failure type (H)  - carvediloL (COREG) 3.125 MG tablet [Pharmacy Med Name: CARVEDILOL 3.125 MG TABLET]; Take 1 tablet (3.125 mg total) by mouth daily.  Dispense: 90 tablet; Refill: 1    If protocol passes may refill for 12 months if within 3 months of last provider visit (or a total of 15 months).             Passed - Blood pressure filed in past 12 months     BP Readings from Last 1 Encounters:   03/06/20 128/62

## 2021-06-11 NOTE — PROGRESS NOTES
Assessment/Plan:    Heather Blakely is a 67 y.o. female presenting for:    1. Gastroesophageal reflux disease, esophagitis presence not specified  We will initiate her on Zantac which she can take twice daily.  One-month sent to the pharmacy.  Discussed risks and benefits of the medication.  I would prefer she took this over omeprazole if it is similarly efficacious for her.  We also discussed foods that would likely flare her symptoms and she will plan on avoiding those foods and not eating prior to bedtime.  - ranitidine (ZANTAC) 150 MG tablet; Take 1 tablet (150 mg total) by mouth 2 (two) times a day.  Dispense: 60 tablet; Refill: 3    2.  Allergies  I recommended that she take a Claritin daily.  She can do this for the next month or 2 until her allergy season has passed.    There are no discontinued medications.        Chief Complaint:  Chief Complaint   Patient presents with     Gastroesophageal Reflux     Medication Education Visit     Med check     Allergies     Seasonal- has been having a hard time with them and breathing at night       Subjective:   Heather Blakely is a very pleasant 67-year-old female with a past medical history significant for reflux presenting to the clinic today for concerns over the same.  The patient has a fairly long past medical history of reflux for the last several years.  This is on and off.  She was taking omeprazole on occasion which did seem to help however she states that it is very expensive when she just buys over-the-counter and was hopeful to get a prescription for it today.  She is otherwise doing well.  She notes that her symptoms are more severe at night but occur throughout the day as well.  She tries not to eat any food after dinner.  When she lays down at night she will notice the reflux coming up and down in her throat.  She has vomited on one occasion.  She is eating and drinking well otherwise.    She also mentioned that she is experiencing some seasonal  "allergies this year.  She is not taking any medication for them.    12 point review of systems completed and negative except for what has been described above    History   Smoking Status     Former Smoker   Smokeless Tobacco     Not on file     Comment: Quit in 1975       Current Outpatient Prescriptions   Medication Sig Note     carvedilol (COREG) 3.125 MG tablet Take 3.125 mg by mouth 2 (two) times a day with meals.      lisinopril (PRINIVIL,ZESTRIL) 2.5 MG tablet Take 2.5 mg by mouth daily.      simvastatin (ZOCOR) 10 MG tablet Take 1 tablet (10 mg total) by mouth 3 (three) times a week. 3x weekly      XIIDRA 5 % Dpet USE 1 DROP(S) IN BOTH EYES 2 TIMES A DAY, GIVE 1 MONTH SUPPLY 6/20/2017: Received from: External Pharmacy     omeprazole (PRILOSEC) 20 MG capsule Take 1 capsule (20 mg total) by mouth daily. (Patient taking differently: Take 20 mg by mouth as needed. )      ranitidine (ZANTAC) 150 MG tablet Take 1 tablet (150 mg total) by mouth 2 (two) times a day.          Objective:  Vitals:    06/20/17 1330   BP: 104/68   Pulse: 68   Resp: 16   Temp: 97.9  F (36.6  C)   TempSrc: Oral   Weight: (!) 222 lb 7 oz (100.9 kg)   Height: 5' 3\" (1.6 m)       Vital signs reviewed and stable  General: No acute distress  Psych: Appropriate affect  HEENT: moist mucous membranes, pupils equal, round, reactive to light and accomodation, posterior oropharynx clear of erythema or exudate, tympanic membranes are pearly grey bilaterally  Lymph: no cervical or supraclavicular lymphadenopathy  Cardiovascular: regular rate and rhythm with no murmur  Pulmonary: clear to auscultation bilaterally with no wheeze  Abdomen: soft, non tender, non distended with normo-active bowel sounds  Extremities: warm and well perfused with no edema  Skin: warm and dry with no rash         This note has been dictated and transcribed using voice recognition software.   Any errors in transcription are unintentional and inherent to the software.  "

## 2021-06-12 ENCOUNTER — HEALTH MAINTENANCE LETTER (OUTPATIENT)
Age: 71
End: 2021-06-12

## 2021-06-13 NOTE — PROGRESS NOTES
Chief Complaint   Patient presents with     Eye Drainage     Poss Sinus infection, red eyes       HPI    Patient is here for nasal discharge an congestion started over a week ago, followed by 3 days of bilateral eye redness with yellow discharges without pain nor itching. No photophobia, fever, chills, sore throat. No contact lenses.     ROS: Pertinent ROS noted in HPI.     Allergies   Allergen Reactions     Sulfa (Sulfonamide Antibiotics)      Since a child       Patient Active Problem List   Diagnosis     Chest Tightness Or Heavy Pressure     Vitamin D Deficiency     Hyperlipidemia     Left Bundle Branch Block     Congestive Heart Failure     Esophageal Reflux     Diffuse Joint Pains (Arthralgias)     Fatigue       No family history on file.    Social History     Social History     Marital status:      Spouse name: N/A     Number of children: N/A     Years of education: N/A     Occupational History     Not on file.     Social History Main Topics     Smoking status: Former Smoker     Smokeless tobacco: Not on file      Comment: Quit in 1975     Alcohol use No     Drug use: No     Sexual activity: Not on file     Other Topics Concern     Not on file     Social History Narrative             Objective:    Vitals:    11/01/17 0846   BP: 128/80   Pulse: 90   Resp: 18   Temp: 97.6  F (36.4  C)   SpO2: 97%       Gen: NAD  Oropharyyx: Normal throat, oral mucosa  Ears: normal TMs and canals  Nose: congested nasal mucosa with purulent discharges  Sinus: pain to percussion of maxillary sinuses bilaterally  Eyes: mild bilateral conjunctival erythema with yellow crusts on eyelids, corneas grossly clear, slight erythema of eyelids bilaterally, PERRLA, EOMI  Neck;NAD  CV:RRR, no M, R, G  Pulm: CTAB, normal effort      Acute sinusitis  -     amoxicillin-clavulanate (AUGMENTIN) 875-125 mg per tablet; Take 1 tablet by mouth 2 (two) times a day for 10 days.    Acute conjunctivitis, bilateral  -     tobramycin (TOBREX) 0.3 %  ophthalmic solution; Administer 1 drop to both eyes every 4 (four) hours for 10 days.

## 2021-06-14 NOTE — PROGRESS NOTES
Assessment/Plan:    Heather Blakely is a 67 y.o. female presenting for:    1. Screen for colon cancer  - Colnicholas    2. Sinusitis  Sounds as though this has been partially treated.  Because this is moved to her chest a bit and I do worry a little bit about a pneumonia I will start her on doxycycline.  Discussed the risks and benefits of the medication.  Encouraged her not to drink while on the medication.  She will take this with food.  - doxycycline (VIBRA-TABS) 100 MG tablet; Take 1 tablet (100 mg total) by mouth 2 (two) times a day for 10 days.  Dispense: 20 tablet; Refill: 0    3. Sleep apnea  Given that she states that she wakes up at night with a headache and a dry mouth as well as the fact that she has been told that she snores and stops breathing at night to think this at least warrants a referral to the sleep specialist.  Likely she will need a sleep study.  - Ambulatory referral to Sleep Medicine        Medications Discontinued During This Encounter   Medication Reason     omeprazole (PRILOSEC) 20 MG capsule Therapy completed     XIIDRA 5 % Dpet Therapy completed           Chief Complaint:  Chief Complaint   Patient presents with     Follow-up     Sinus infection and bilateral pink eye on 11/1/17- abx finished- now feeling more in her chest with fatigue     Cough       Subjective:   Heather Blakely is a pleasant 67-year-old female presenting to the clinic today for concerns over a continued sinus infection.  The patient actually became ill in late October.  About 7 days after she became ill she went to the walk-in clinic.  She states that her eyes were very swollen and her face was also swollen with such a severe sinus infection.  She was started on antibiotics and antibiotic eyedrops.  She took her Augmentin twice daily for 10 days.  She actually felt much better and was feeling better for about 3 days after finishing the course however now in the past week she has been feeling ill again.  She has not  "noticed the swelling in her face but feels low-grade fevers, fatigue, chills and excessive drainage.  She is also having some pressure in her sinuses.  She is fairly distraught regarding all of this given that she was actually feeling a bit better.    She returns to the clinic today to discuss plans for what to do from moving forward.  She states that she is coughing at night and it is keeping her awake.    Otherwise, she is doing fairly well.  She states that she has been told to get a sleep study for sleep apnea previously but has not done so.  She wonders if she could get that referral today.    12 point review of systems completed and negative except for what has been described above    History   Smoking Status     Former Smoker   Smokeless Tobacco     Not on file     Comment: Quit in 1975       Current Outpatient Prescriptions   Medication Sig     carvedilol (COREG) 3.125 MG tablet Take 3.125 mg by mouth daily.      lisinopril (PRINIVIL,ZESTRIL) 2.5 MG tablet Take 2.5 mg by mouth daily.     ranitidine (ZANTAC) 150 MG tablet TAKE 1 TABLET (150 MG TOTAL) BY MOUTH 2 (TWO) TIMES A DAY.     simvastatin (ZOCOR) 10 MG tablet Take 1 tablet (10 mg total) by mouth 3 (three) times a week. 3x weekly     doxycycline (VIBRA-TABS) 100 MG tablet Take 1 tablet (100 mg total) by mouth 2 (two) times a day for 10 days.         Objective:  Vitals:    11/20/17 1513   BP: 112/80   Pulse: 68   Resp: 16   Temp: 97.5  F (36.4  C)   TempSrc: Oral   SpO2: 98%   Weight: (!) 225 lb 3 oz (102.1 kg)   Height: 5' 3\" (1.6 m)       Vital signs reviewed and stable  General: No acute distress  Psych: Appropriate affect  HEENT: moist mucous membranes, pupils equal, round, reactive to light and accomodation, posterior oropharynx clear of erythema or exudate, tympanic membranes are pearly grey bilaterally  Lymph: no cervical or supraclavicular lymphadenopathy  Cardiovascular: regular rate and rhythm with no murmur  Pulmonary: clear to auscultation " bilaterally with no wheeze  Abdomen: soft, non tender, non distended with normo-active bowel sounds  Extremities: warm and well perfused with no edema  Skin: warm and dry with no rash         This note has been dictated and transcribed using voice recognition software.   Any errors in transcription are unintentional and inherent to the software.

## 2021-06-15 NOTE — TELEPHONE ENCOUNTER
Refill Approved    Rx renewed per Medication Renewal Policy. Medication was last renewed on 9/3/20.    Shlomo Quijano, [facility] Triage/Med Refill 2/26/2021     Requested Prescriptions   Pending Prescriptions Disp Refills     carvediloL (COREG) 3.125 MG tablet 90 tablet 1     Sig: Take 1 tablet (3.125 mg total) by mouth daily.       Beta-Blockers Refill Protocol Passed - 2/26/2021  9:05 AM        Passed - PCP or prescribing provider visit in past 12 months or next 3 months     Last office visit with prescriber/PCP: 6/26/2019 Carrie Delgado MD OR same dept: Visit date not found OR same specialty: 6/26/2019 Carrie Delgado MD  Last physical: 3/6/2020 Last MTM visit: Visit date not found   Next visit within 3 mo: Visit date not found  Next physical within 3 mo: Visit date not found  Prescriber OR PCP: Carrie Delgado MD  Last diagnosis associated with med order: 1. Congestive heart failure, unspecified HF chronicity, unspecified heart failure type (H)  - carvediloL (COREG) 3.125 MG tablet; Take 1 tablet (3.125 mg total) by mouth daily.  Dispense: 90 tablet; Refill: 1    If protocol passes may refill for 12 months if within 3 months of last provider visit (or a total of 15 months).             Passed - Blood pressure filed in past 12 months     BP Readings from Last 1 Encounters:   03/06/20 128/62

## 2021-06-15 NOTE — PROGRESS NOTES
"Dear Dr. Carrie Delgado Md  [id number]  [address] [address]    Thank you for the opportunity to participate in the care of . Heather Blakely.    She is a 68 y.o. female who comes to the clinic with a chief complaint of excessive daytime sleepiness that is been going on for at least 6 months.  While the patient denies any episodes of witnessed apnea, she has been told that she does snore loudly during sleep for more than 10 years.  The patient also complains of waking up with a morning headache as well as occasionally waking up choking.  The patient also complains of restless leg-like symptoms during sleep.  The patient's review of system is significant for bundle branch block of the heart as well as reflux.  All these medical issues have been addressed by the providers.     Past Medical History  History reviewed. No pertinent past medical history.     Past Surgical History  Past Surgical History:   Procedure Laterality Date     AR ARTHROPLASTY TIBIAL PLATEAU      Description: Knee Replacement;  Recorded: 06/25/2013;  Comments: 1969 = \"reconstruction\" per pt     AR LIGATE FALLOPIAN TUBE      Description: Tubal Ligation;  Recorded: 06/25/2013;     AR REMOVAL GALLBLADDER      Description: Cholecystectomy;  Proc Date: 01/01/2000;        Meds  Current Outpatient Prescriptions   Medication Sig Dispense Refill     carvedilol (COREG) 3.125 MG tablet Take 3.125 mg by mouth daily.        lisinopril (PRINIVIL,ZESTRIL) 2.5 MG tablet Take 2.5 mg by mouth daily.       ranitidine (ZANTAC) 150 MG tablet TAKE 1 TABLET (150 MG TOTAL) BY MOUTH 2 (TWO) TIMES A DAY. 90 tablet 1     simvastatin (ZOCOR) 10 MG tablet Take 1 tablet (10 mg total) by mouth 3 (three) times a week. 3x weekly 12 tablet 0     No current facility-administered medications for this visit.         Allergies  Sulfa (sulfonamide antibiotics)     Social History  Social History     Social History     Marital status:      Spouse name: N/A "     Number of children: N/A     Years of education: N/A     Occupational History     Not on file.     Social History Main Topics     Smoking status: Former Smoker     Smokeless tobacco: Never Used      Comment: Quit in 1975     Alcohol use No     Drug use: No     Sexual activity: Not on file     Other Topics Concern     Not on file     Social History Narrative        Family History  Family History   Problem Relation Age of Onset     Sleep apnea Child         Review of Systems:  Constitutional: Negative except as noted in HPI.   Eyes: Negative except as noted in HPI.   ENT: Negative except as noted in HPI.   Cardiovascular: Negative except as noted in HPI.   Respiratory: Negative except as noted in HPI.   Gastrointestinal: Negative except as noted in HPI.   Genitourinary: Negative except as noted in HPI.   Musculoskeletal: Negative except as noted in HPI.   Integumentary: Negative except as noted in HPI.   Neurological: Negative except as noted in HPI.   Psychiatric: Negative except as noted in HPI.   Endocrine: Negative except as noted in HPI.   Hematologic/Lymphatic: Negative except as noted in HPI.      STOP BANG 1/11/2018   Do you snore loudly (louder than talking or loud enough to be heard through closed doors)? 1   Do you often feel tired, fatigued, or sleepy during daytime? 1   Has anyone observed you stop breathing in your sleep? 0   Do you have or are you being treated for high blood pressure? 1   BMI more than 35 kg/m2 1   Age over 50 years old? 1   Neck circumference greater than 16 inches? 0   Gender male? 0   Total Score 5   Epworths Sleepiness Scale 1/11/2018   Sitting and reading 1   Watching TV 2   Sitting, inactive in a public place (e.g. a theatre or a meeting) 2   As a passenger in a car for an hour without a break 0   Lying down to rest in the afternoon when circumstances permit 3   Sitting quietly after a lunch without alcohol 2   In a car, while stopped for a few minutes in traffic 0   Rooming  "1/11/2018   Usual bedtime 1030   Sleep Latency 5 mn   Awakenings 2   Wake Up Time 430   Energy Drinks 0   Coffee y   Cola y   Difficulty falling asleep No   Difficulty staying asleep No   Excessive daytime tiredness No   Excessive daytime sleepiness No   Dozing off while driving No   Shift Worker No   Sleep Walking? No   Sleep Talking? No   Kicking or punching? No   Restless legs symptoms Yes       Physical Exam:  /70  Pulse 91  Ht 5' 3\" (1.6 m)  Wt (!) 223 lb 11.2 oz (101.5 kg)  SpO2 94%  BMI 39.63 kg/m2  BMI:Body mass index is 39.63 kg/(m^2).   GEN: NAD, obese  Head: Normocephalic.  EYES: PERRLA, EOMI  ENT: Oropharynx is clear, mallampatti class 4+ airway.   Nasal mucosa is moist without erythema  Neck : Thyroid is within normal limits. Neck circ 13.5\"  CV: Regular rate and rhythm, S1 & S2 positive.  LUNGS: Bilateral breathsounds heard.   ABDOMEN: Positive bowel sounds in all quadrants, soft, no rebound or guarding  MUSCULOSKELETAL: Bilateral trace leg swelling  SKIN: warm, dry, no rashes  Neurological: Alert, oriented to time, place, and person.  Psych: normal mood, normal affect     Labs/Studies:     Lab Results   Component Value Date    WBC 6.9 05/10/2015    HGB 13.7 05/10/2015    HCT 42.1 05/10/2015    MCV 86 05/10/2015     05/10/2015         Chemistry        Component Value Date/Time     05/10/2015 0915    K 4.4 05/10/2015 0915     05/10/2015 0915    CO2 24 05/10/2015 0915    BUN 13 05/10/2015 0915    CREATININE 0.83 05/10/2015 0915    GLU 94 05/10/2015 0915        Component Value Date/Time    CALCIUM 9.5 05/10/2015 0915    ALKPHOS 67 05/10/2015 0915    AST 17 05/10/2015 0915    ALT 18 05/10/2015 0915    BILITOT 0.4 05/10/2015 0915            No results found for: FERRITIN  Lab Results   Component Value Date    TSH 3.0 06/25/2013         Assessment and Plan:  In summary Heather Blakely is a 68 y.o. year old female here for sleep disturbance.  1.  Hypersomnia   Mrs. Heather MURGUIA" Reddy has high risk for obstructive sleep apnea based on the history of hypersomnia, snoring and a crowded airway. I educated the patient on the underlying pathophysiology of obstructive sleep apnea. We reviewed the risks associated with sleep apnea, including increased cardiovascular risk and overall death. We talked about treatments briefly. I recommend getting a Home sleep study. The patient should return to the clinic to discuss results and treatment option in a patient-centered approach.  2.  Snoring  3.  Other sleep disturbance  4.  Obesity    Patient verbalized understanding of these issues, agrees with the plan and all questions were answered today. Patient was given an opportuntity to voice any other symptoms or concerns not listed above. Patient did not have any other symptoms or concerns.      Patient told to return in one week after the sleep study is interpreted. Patient instructed to stop at  to schedule appointment before leaving today.     Osmel Benitez DO  Board Certified in Internal Medicine and Sleep Medicine  [facility].    (Note created with Dragon voice recognition and unintended spelling errors and word substitutions may occur)

## 2021-06-15 NOTE — TELEPHONE ENCOUNTER
Requested Prescriptions     Pending Prescriptions Disp Refills     carvediloL (COREG) 3.125 MG tablet 90 tablet 1     Sig: Take 1 tablet (3.125 mg total) by mouth daily.

## 2021-06-17 NOTE — PATIENT INSTRUCTIONS - HE
Patient Instructions by Mariam Freeman PT at 11/5/2019  7:00 AM     Author: Mariam Freeman PT Service: -- Author Type: Physical Therapist    Filed: 11/5/2019  7:26 AM Encounter Date: 11/5/2019 Status: Signed    : Mariam Freeman PT (Physical Therapist)            ANTERIOR HIP CAPSULE STRETCH    Lie in position pictured.  Gently press down on knee to feel a stretch.    Hold 30 seconds x 2

## 2021-06-17 NOTE — PATIENT INSTRUCTIONS - HE
Patient Instructions by Linda Storey PT at 11/14/2019  7:30 AM     Author: Linda Storey PT Service: -- Author Type: Physical Therapist    Filed: 11/14/2019  7:54 AM Encounter Date: 11/14/2019 Status: Signed    : Linda Storey PT (Physical Therapist)        LOWER TRUNK ROTATIONS - LTR    Lying on your back with your knees bent, gently move your knees side-to-side.    Start just moving knees to the right, GENTLE, 5-10 reps      PRONE ON ELBOWS - KEREN    Lying face down, slowly raise up and prop yourself up on your elbows.    Hold 10-30 sec, 2-3 times

## 2021-06-17 NOTE — PATIENT INSTRUCTIONS - HE
Patient Instructions by Fox Duque PT at 8/14/2019  9:00 AM     Author: Fox Duque PT Service: -- Author Type: Physical Therapist    Filed: 8/14/2019  9:20 AM Encounter Date: 8/14/2019 Status: Signed    : Fox Duque PT (Physical Therapist)            ABDOMINAL BRACING    While lying on your back, tighten your stomach muscles as you draw your navel down towards the floor. Hold for 5 seconds. Do 10 reps at a time. Perform throughout the day in sitting, standing, and lying down positions.       CLAM SHELLS    While lying on your side with your knees bent, draw up the top knee while keeping contact of your feet together.    Do not let your pelvis roll back during the lifting movement. Hold for 5 seconds. Do until muscle fatigue on each side, 1x/day

## 2021-06-17 NOTE — PATIENT INSTRUCTIONS - HE
"Patient Instructions by Mariam Freeman PT at 11/1/2019 10:00 AM     Author: Mariam Freeman PT Service: -- Author Type: Physical Therapist    Filed: 11/1/2019 10:57 AM Encounter Date: 11/1/2019 Status: Signed    : Mariam Freeman PT (Physical Therapist)          SUPINE HIP ABDUCTION - ELASTIC BAND CLAMS    Lie down on your back with your knees bent. Place an elastic band around your knees and then draw your knees apart.    x10-15 repetitions  1-2 sets  Count to 5 on the way out and in        HIP ABDUCTION - SIDELYING    While lying on your side, slowly raise up your top leg to the side. Keep your knee straight and maintain your toes pointed forward the entire time.     The bottom leg can be bent to stabilize your body.    x10-15 repetitions  1-2 sets        BRIDGING    While lying on your back, tighten your lower abdominals, squeeze your buttocks and then raise your buttocks off the floor/bed as creating a \"Bridge\" with your body.    Hold 5 seconds  x10-15 repetitions  1-2 sets     Femoral Nerve Sliders      INSTRUCTIONS:  ? Lie on your stomach and prop up on your elbows  ? Flex one knee toward the ceiling and also tip your head slightly back  ? One you reach the furthest you can go, slowly bring the leg down and also flex your head down  ? Repeat this motion without holding, just gently move up and down  ? Repeat _10-15_ times  ? Repeat _1-2__ sets  Repeat _6-8__ times per day           "

## 2021-06-17 NOTE — PATIENT INSTRUCTIONS - HE
"Patient Instructions by Fox Duque PT at 8/21/2019  9:00 AM     Author: Fox Duque PT Service: -- Author Type: Physical Therapist    Filed: 8/21/2019  9:25 AM Encounter Date: 8/21/2019 Status: Signed    : Fox Duque PT (Physical Therapist)         Standing hip extension: while standing at your counter top, slowly lift your leg backwards and then back to the starting position. Do until fatigue on each side. Perform 1x/day or every other day     BRIDGING    While lying on your back, tighten your lower abdominals, squeeze your buttocks and then raise your buttocks off the floor/bed as creating a \"Bridge\" with your body. Hold for 5-10 seconds. Do 10-15 reps, 1x/day or every other day      BRACE - SINGLE KNEE EXTENSION    While lying on your back with knees bent, straighten out one knee while keeping the leg off the ground. Hold as indicated, then return to original position. Next, perform on the other leg.    Use your stomach muscles to keep your spine from moving the entire time. Do 10-15 reps on each side, 1x/day or every other day            "

## 2021-06-17 NOTE — PROGRESS NOTES
"Dear Dr. Carrie Delgado MD  [address] [address]  [address] [address],    Thank you for the opportunity to participate in the care of Heather Blakely.     She is a 68 y.o.  female patient who comes to the sleep medicine clinic for review of her home sleep study. The study was completed on 01/15/18 which showed the the patient had mild obstructive sleep apnea with an apnea hypopnea index of 8 events per hour with the lowest O2 sat of 77%.  The patient was informed of the results and offered the option of getting started on CPAP therapy which she agreed to.  She states that since starting CPAP therapy her sleep quality has improved.  She has not noticed a dramatic change in her energy level.  She rates her overall CPAP usage experience as a positive one.    Compliance Download data for 30 days:  Pressure settin-15 CWP  Residual AHI: 0.2 events per hour  Leak: Minimal  Compliance: 80%  Mask Tolerance: Good  Skin irritation: None      Current Outpatient Prescriptions   Medication Sig Dispense Refill     carvedilol (COREG) 3.125 MG tablet Take 3.125 mg by mouth daily.        lisinopril (PRINIVIL,ZESTRIL) 2.5 MG tablet Take 2.5 mg by mouth daily.       ranitidine (ZANTAC) 150 MG tablet Take 1 tablet (150 mg total) by mouth 2 (two) times a day. 90 tablet 0     simvastatin (ZOCOR) 10 MG tablet Take 1 tablet (10 mg total) by mouth 3 (three) times a week. 3x weekly 12 tablet 0     No current facility-administered medications for this visit.        Allergies   Allergen Reactions     Sulfa (Sulfonamide Antibiotics)      Since a child       Physical Exam:  /68  Pulse 93  Ht 5' 3\" (1.6 m)  Wt (!) 222 lb 14.4 oz (101.1 kg)  SpO2 98%  BMI 39.48 kg/m2  BMI:Body mass index is 39.48 kg/(m^2).   GEN: NAD, obese  Psych: normal mood, normal affect     Labs/Studies:  - We reviewed the results of the overnight PSG as described on the HPI.     Lab Results   Component Value Date    WBC 6.9 05/10/2015    HGB 13.7 " 05/10/2015    HCT 42.1 05/10/2015    MCV 86 05/10/2015     05/10/2015         Chemistry        Component Value Date/Time     05/10/2015 0915    K 4.4 05/10/2015 0915     05/10/2015 0915    CO2 24 05/10/2015 0915    BUN 13 05/10/2015 0915    CREATININE 0.83 05/10/2015 0915    GLU 94 05/10/2015 0915        Component Value Date/Time    CALCIUM 9.5 05/10/2015 0915    ALKPHOS 67 05/10/2015 0915    AST 17 05/10/2015 0915    ALT 18 05/10/2015 0915    BILITOT 0.4 05/10/2015 0915            No results found for: FERRITIN        Assessment and Plan:  In summary Heather Blakely is a 68 y.o. year old female here for review of her sleep study and compliance download review.  1. Obstructive Sleep Apnea  I reviewed the results of sleep study with the patient in detail.  And I congratulated her on her excellent usage.  I will narrow her pressure range to five-point 5.6-14 CWP.  2.  Other sleep disturbance    Patient verbalized understanding of these issues, agrees with the plan and all questions were answered today. Patient was given an opportuntity to voice any other symptoms or concerns not listed above. Patient did not have any other symptoms or concerns.        Osmel Benitez DO  Board Certified in Internal Medicine and Sleep Medicine  [facility].    We spent a total of 15 minutes of face-to-face encounter and more than 50% of the encounter was used for counseling or coordination of care.    (Note created with Dragon voice recognition and unintended spelling errors and word substitutions may occur)

## 2021-06-17 NOTE — PATIENT INSTRUCTIONS - HE
"Patient Instructions by Osmel Benitez DO at 4/18/2019  1:00 PM     Author: Osmel Benitez DO Service: -- Author Type: Physician    Filed: 4/18/2019  1:12 PM Encounter Date: 4/18/2019 Status: Signed    : Osmel Benitez DO (Physician)         SLEEP HYGIENE    Sleep only as much as you need to feel rested and then get out of bed   Keep a regular sleep schedule   Avoid forcing sleep   Exercise regularly for at least 20 minutes, preferably 4 to 5 hours before bedtime   Avoid caffeinated beverages after lunch   Avoid alcohol near bedtime: no \"night cap\"   Avoid smoking, especially in the evening   Do not go to bed hungry   Adjust bedroom environment   Avoid prolonged use of light-emitting screens before bedtime    Deal with your worries before bedtime              "

## 2021-06-17 NOTE — PATIENT INSTRUCTIONS - HE
Patient Instructions by Fox Duque PT at 8/5/2019  2:00 PM     Author: Fox Duque PT Service: -- Author Type: Physical Therapist    Filed: 8/5/2019  2:42 PM Encounter Date: 8/5/2019 Status: Signed    : Fox Duque PT (Physical Therapist)       It is recommended that you ice 2-3x/day for 20 minutes at a time. Remember to not apply ice directly on skin.    Avoid sitting for more than 30 minutes at a time. Try to get up and move around after 30-45 minutes    Avoid bending, lifting, and twisting motions, or combinations of.        Lie on your back   - Grab behind your knee slightly pulling your knee to your chest (you can use a towel if needed)   - Straighten the leg as far as you can. Get a nice easy stretch. Do not hold   - Bring the leg down   - do 20 reps on your right leg only. Perform 5x/day. Do a set before bed and whenever you have night pain     SINGLE KNEE TO CHEST STRETCH - SKTC    While Lying on your back,  hold your knee and gently pull it up towards your chest. Hold for 30 seconds. Do 2-3 reps on each side. Perform 1-2x/day      PIRIFORMIS STRETCH - MODIFIED    While lying on your back, hold your knee with your opposite hand and draw your knee up and over towards your opposite shoulder. Hold for 30 seconds. Do 2-3 reps on each side. Perform 1-2x/day

## 2021-06-18 ENCOUNTER — HOSPITAL ENCOUNTER (OUTPATIENT)
Dept: MAMMOGRAPHY | Facility: CLINIC | Age: 71
Discharge: HOME | End: 2021-06-18
Attending: FAMILY MEDICINE | Admitting: FAMILY MEDICINE
Payer: COMMERCIAL

## 2021-06-18 DIAGNOSIS — Z12.31 VISIT FOR SCREENING MAMMOGRAM: ICD-10-CM

## 2021-06-18 PROCEDURE — 77067 SCR MAMMO BI INCL CAD: CPT

## 2021-06-23 NOTE — TELEPHONE ENCOUNTER
RN cannot approve Refill Request    RN can NOT refill this medication Protocol failed and NO refill given. Last office visit: 11/20/2017 Carrie Delgado MD Last Physical: Visit date not found Last MTM visit: Visit date not found Last visit same specialty: 11/20/2017 Carrie Delgado MD.  Next visit within 3 mo: Visit date not found  Next physical within 3 mo: Visit date not found      Amanda Javed, [facility] Triage/Med Refill 1/16/2019    Requested Prescriptions   Pending Prescriptions Disp Refills     ranitidine (ZANTAC) 150 MG tablet [Pharmacy Med Name: RANITIDINE 150 MG TABLET] 90 tablet 3     Sig: TAKE 1 TABLET (150 MG TOTAL) BY MOUTH 2 (TWO) TIMES A DAY.    GI Medications Refill Protocol Failed - 1/16/2019  1:52 AM       Failed - PCP or prescribing provider visit in last 12 or next 3 months.    Last office visit with prescriber/PCP: 11/20/2017 Carrie Delgado MD OR same dept: Visit date not found OR same specialty: 11/20/2017 Carrie Delgado MD  Last physical: Visit date not found Last MTM visit: Visit date not found   Next visit within 3 mo: Visit date not found  Next physical within 3 mo: Visit date not found  Prescriber OR PCP: Carrie Delgado MD  Last diagnosis associated with med order: 1. Gastroesophageal reflux disease, esophagitis presence not specified  - ranitidine (ZANTAC) 150 MG tablet [Pharmacy Med Name: RANITIDINE 150 MG TABLET]; Take 1 tablet (150 mg total) by mouth 2 (two) times a day.  Dispense: 90 tablet; Refill: 3    If protocol passes may refill for 12 months if within 3 months of last provider visit (or a total of 15 months).

## 2021-06-24 NOTE — TELEPHONE ENCOUNTER
Refill Approved    Rx renewed per Medication Renewal Policy. Medication was last renewed on 1/16/2019.  Last office visit: .2/21/2019 with Dr ZITA Stout @ [facility] clinic.     Yue Hopper, [facility] Triage/Med Refill 2/23/2019     Requested Prescriptions   Pending Prescriptions Disp Refills     ranitidine (ZANTAC) 150 MG tablet [Pharmacy Med Name: RANITIDINE 150 MG TABLET] 60 tablet 0     Sig: TAKE 1 TABLET (150 MG TOTAL) BY MOUTH 2 (TWO) TIMES A DAY.    GI Medications Refill Protocol Passed - 2/22/2019  1:56 AM       Passed - PCP or prescribing provider visit in last 12 or next 3 months.    Last office visit with prescriber/PCP: 11/20/2017 Carrie Delgado MD OR same dept: 2/21/2019 Tammy Stout DO OR same specialty: 2/21/2019 Tammy Stout DO  Last physical: Visit date not found Last MTM visit: Visit date not found   Next visit within 3 mo: Visit date not found  Next physical within 3 mo: Visit date not found  Prescriber OR PCP: Carrie Delgado MD  Last diagnosis associated with med order: 1. Gastroesophageal reflux disease, esophagitis presence not specified  - ranitidine (ZANTAC) 150 MG tablet [Pharmacy Med Name: RANITIDINE 150 MG TABLET]; TAKE 1 TABLET (150 MG TOTAL) BY MOUTH 2 (TWO) TIMES A DAY.  Dispense: 60 tablet; Refill: 0    If protocol passes may refill for 12 months if within 3 months of last provider visit (or a total of 15 months).

## 2021-06-24 NOTE — PROGRESS NOTES
"[facility] Clinic Note    Heather Blakely  1950   [id number]    Heather Blakely is a 69 y.o. female presenting to discuss the following:     CC:   Chief Complaint   Patient presents with     Back Pain     Abdominal Pain     HPI:  Has had a lot of left lower back pain, radiates into stomach area. Always has pain on the right lower quadrant. Pain has worsened off and on in the day. Urine is bright yellow and frequent. Symptoms are better today. Symptoms started on Sunday. No hematuria. A little burning with urination. No concerns with vaginal discharge. No fevers or chills. Pain pain rated 4-5. Pain is achy. Has never had before. No radicular symptoms. No numbness or weakness. No bowel or bladder incontinence. Has been shoveling snow more, more physically active. Not lifting snow up, just pushing it. Normal bowel movements, denies constipation, no blood in stools.     Not taking simvastatin: cardiologist said not to take anymore. Was in clinic in August, told didn't need to come back for 2 years.     The ASCVD Risk score ([place] KRISTIN Jr., et al., 2013) failed to calculate for the following reasons:    Cannot find a previous HDL lab    Cannot find a previous total cholesterol lab     HEALTH MAINTENANCE:   - mammogram: will get scheduled.   - cologuard: mailed in sample, but was not evaluated.      ROS:   See HPI above.     PMH:   Patient Active Problem List   Diagnosis     Chest Tightness Or Heavy Pressure     Vitamin D Deficiency     Hyperlipidemia     Left Bundle Branch Block     Congestive Heart Failure     Esophageal Reflux     Diffuse Joint Pains (Arthralgias)     Fatigue     PSH:   Past Surgical History:   Procedure Laterality Date     AK ARTHROPLASTY TIBIAL PLATEAU      Description: Knee Replacement;  Recorded: 06/25/2013;  Comments: 1969 = \"reconstruction\" per pt     AK LIGATE FALLOPIAN TUBE      Description: Tubal Ligation;  Recorded: 06/25/2013;     AK REMOVAL GALLBLADDER      Description: " "Cholecystectomy;  Proc Date: 01/01/2000;     MEDICATIONS:   Current Outpatient Medications on File Prior to Visit   Medication Sig Dispense Refill     carvedilol (COREG) 3.125 MG tablet Take 3.125 mg by mouth daily.        cholecalciferol, vitamin D3, 2,000 unit Tab Take 4,000 Units by mouth.       lisinopril (PRINIVIL,ZESTRIL) 2.5 MG tablet Take 2.5 mg by mouth daily.       ranitidine (ZANTAC) 150 MG tablet Take 1 tablet (150 mg total) by mouth 2 (two) times a day. Due for an office visit prior to further refills (noted 01/16/19) 60 tablet 0     [DISCONTINUED] simvastatin (ZOCOR) 10 MG tablet Take 1 tablet (10 mg total) by mouth 3 (three) times a week. 3x weekly 12 tablet 0     No current facility-administered medications on file prior to visit.      ALLERGIES:  Allergies   Allergen Reactions     Sulfa (Sulfonamide Antibiotics)      Since a child     PHYSICAL EXAM:   /78   Pulse 88   Temp 97.7  F (36.5  C) (Oral)   Resp 20   Ht 5' 3\" (1.6 m)   Wt (!) 229 lb 8 oz (104.1 kg)   BMI 40.65 kg/m     GENERAL: Heather is a pleasant, obese female, in no acute distress.   HEART: Regular rate and rhythm, no murmurs.  LUNGS: Clear to auscultation bilaterally, unlabored.   ABDOMEN: Soft, obese, mildly tender to palpation LLQ, no guarding, no rigidity, no rebound tenderness, no palpable masses.   : No costovertebral angle tenderness, no suprapubic tenderness.   MSK: Left paraspinal musculature mildly tight in texture and tender to palpation.   NEURO: Normal gait.     LABS:   Recent Results (from the past 24 hour(s))   Urinalysis-UC if Indicated    Collection Time: 02/21/19  8:06 AM   Result Value Ref Range    Color, UA Yellow Colorless, Yellow, Straw, Light Yellow    Clarity, UA Clear Clear    Glucose, UA Negative Negative    Bilirubin, UA Negative Negative    Ketones, UA Negative Negative    Specific Gravity, UA 1.020 1.005 - 1.030    Blood, UA Negative Negative    pH, UA 7.0 5.0 - 8.0    Protein, UA Negative " Negative mg/dL    Urobilinogen, UA 1.0 E.U./dL 0.2 E.U./dL, 1.0 E.U./dL    Nitrite, UA Negative Negative    Leukocytes, UA Negative Negative     ASSESSMENT & PLAN:   Heather Blakely is a 69 y.o. female presenting today with concerns for UTI.    1. Back pain  - Urinalysis-UC if Indicated    Sita presented today with concerns for urinary tract infection.  UA is completely normal.  I do not suspect UTI.  We did discuss differential diagnosis including kidney stones, ovarian cyst/mass, appendicitis, constipation, gastroenteritis, and musculoskeletal strain.  I favor diagnosis of lumbar muscle spasm in setting of increased activity with shoveling snow.      We discussed option of further workup including pelvic ultrasound and possible blood work. Sita is reassured with normal UA.  Sita is comfortable with just further observation.  She will let me know if symptoms are worsening.  Plan to follow-up on symptoms and for annual wellness visit with Dr. Mcelroy in approximately 2-4 weeks.    2. Visit for screening mammogram  - Mammo Screening Bilateral; Future    3. Colon cancer screening  - Cologuard     HM: Reviewed mammogram and colonoscopy today.  She will follow-up for annual wellness exam to discuss further health maintenance.    RTC: 2-4 weeks - annual wellness exam     Tammy Stout DO

## 2021-06-30 ENCOUNTER — RECORDS - HEALTHEAST (OUTPATIENT)
Dept: ADMINISTRATIVE | Facility: OTHER | Age: 71
End: 2021-06-30

## 2021-07-03 NOTE — ADDENDUM NOTE
Addendum Note by Carrie Delgado MD at 7/11/2019 10:05 AM     Author: Carrie Delgado MD Service: -- Author Type: Physician    Filed: 7/11/2019 10:05 AM Encounter Date: 7/3/2019 Status: Signed    : Carrie Delgado MD (Physician)    Addended by: CARRIE DELGADO on: 7/11/2019 10:05 AM        Modules accepted: Orders

## 2021-07-21 ENCOUNTER — RECORDS - HEALTHEAST (OUTPATIENT)
Dept: ADMINISTRATIVE | Facility: CLINIC | Age: 71
End: 2021-07-21

## 2021-07-29 ENCOUNTER — HOSPITAL ENCOUNTER (OUTPATIENT)
Facility: CLINIC | Age: 71
End: 2021-07-29
Attending: ORTHOPAEDIC SURGERY | Admitting: ORTHOPAEDIC SURGERY
Payer: COMMERCIAL

## 2021-07-30 DIAGNOSIS — Z11.59 ENCOUNTER FOR SCREENING FOR OTHER VIRAL DISEASES: ICD-10-CM

## 2021-08-27 NOTE — PROGRESS NOTES
Order for Durable Medical Equipment was processed and equipment ordered.   DME provider: [facility]  Date Faxed: 01/17/2018  Ordering Provider: Dr. Benitez  Equipment ordered: Cpap      2/10/20 7/29/21

## 2021-08-30 RX ORDER — TRANEXAMIC ACID 650 MG/1
1950 TABLET ORAL ONCE
Status: CANCELLED | OUTPATIENT
Start: 2021-08-30 | End: 2021-08-30

## 2021-08-30 RX ORDER — CEFAZOLIN SODIUM 2 G/100ML
2 INJECTION, SOLUTION INTRAVENOUS
Status: CANCELLED | OUTPATIENT
Start: 2021-09-14

## 2021-08-30 RX ORDER — CEFAZOLIN SODIUM 2 G/100ML
2 INJECTION, SOLUTION INTRAVENOUS SEE ADMIN INSTRUCTIONS
Status: CANCELLED | OUTPATIENT
Start: 2021-09-14

## 2021-08-31 ENCOUNTER — OFFICE VISIT (OUTPATIENT)
Dept: FAMILY MEDICINE | Facility: CLINIC | Age: 71
End: 2021-08-31
Payer: COMMERCIAL

## 2021-08-31 VITALS
HEIGHT: 63 IN | WEIGHT: 219 LBS | HEART RATE: 89 BPM | SYSTOLIC BLOOD PRESSURE: 112 MMHG | DIASTOLIC BLOOD PRESSURE: 70 MMHG | TEMPERATURE: 98.1 F | BODY MASS INDEX: 38.8 KG/M2 | OXYGEN SATURATION: 98 %

## 2021-08-31 DIAGNOSIS — R73.9 ELEVATED BLOOD SUGAR: ICD-10-CM

## 2021-08-31 DIAGNOSIS — M16.12 PRIMARY OSTEOARTHRITIS OF LEFT HIP: ICD-10-CM

## 2021-08-31 DIAGNOSIS — I44.7 LEFT BUNDLE BRANCH BLOCK (LBBB): ICD-10-CM

## 2021-08-31 DIAGNOSIS — B33.24 VIRAL CARDIOMYOPATHY (H): ICD-10-CM

## 2021-08-31 DIAGNOSIS — Z01.818 PREOP GENERAL PHYSICAL EXAM: Primary | ICD-10-CM

## 2021-08-31 DIAGNOSIS — R00.2 PALPITATIONS: ICD-10-CM

## 2021-08-31 LAB
ANION GAP SERPL CALCULATED.3IONS-SCNC: 5 MMOL/L (ref 3–14)
BUN SERPL-MCNC: 16 MG/DL (ref 7–30)
CALCIUM SERPL-MCNC: 8.5 MG/DL (ref 8.5–10.1)
CHLORIDE BLD-SCNC: 104 MMOL/L (ref 94–109)
CO2 SERPL-SCNC: 28 MMOL/L (ref 20–32)
CREAT SERPL-MCNC: 0.92 MG/DL (ref 0.52–1.04)
ERYTHROCYTE [DISTWIDTH] IN BLOOD BY AUTOMATED COUNT: 13.7 % (ref 10–15)
GFR SERPL CREATININE-BSD FRML MDRD: 63 ML/MIN/1.73M2
GLUCOSE BLD-MCNC: 98 MG/DL (ref 70–99)
HBA1C MFR BLD: 5.7 % (ref 0–5.6)
HCT VFR BLD AUTO: 44.7 % (ref 35–47)
HGB BLD-MCNC: 14.7 G/DL (ref 11.7–15.7)
MCH RBC QN AUTO: 29.1 PG (ref 26.5–33)
MCHC RBC AUTO-ENTMCNC: 32.9 G/DL (ref 31.5–36.5)
MCV RBC AUTO: 89 FL (ref 78–100)
PLATELET # BLD AUTO: 294 10E3/UL (ref 150–450)
POTASSIUM BLD-SCNC: 4.3 MMOL/L (ref 3.4–5.3)
RBC # BLD AUTO: 5.05 10E6/UL (ref 3.8–5.2)
SODIUM SERPL-SCNC: 137 MMOL/L (ref 133–144)
TSH SERPL DL<=0.005 MIU/L-ACNC: 3.61 MU/L (ref 0.4–4)
WBC # BLD AUTO: 7.9 10E3/UL (ref 4–11)

## 2021-08-31 PROCEDURE — 84443 ASSAY THYROID STIM HORMONE: CPT | Performed by: FAMILY MEDICINE

## 2021-08-31 PROCEDURE — 80048 BASIC METABOLIC PNL TOTAL CA: CPT | Performed by: FAMILY MEDICINE

## 2021-08-31 PROCEDURE — 99214 OFFICE O/P EST MOD 30 MIN: CPT | Performed by: FAMILY MEDICINE

## 2021-08-31 PROCEDURE — 36415 COLL VENOUS BLD VENIPUNCTURE: CPT | Performed by: FAMILY MEDICINE

## 2021-08-31 PROCEDURE — 83036 HEMOGLOBIN GLYCOSYLATED A1C: CPT | Performed by: FAMILY MEDICINE

## 2021-08-31 PROCEDURE — 85027 COMPLETE CBC AUTOMATED: CPT | Performed by: FAMILY MEDICINE

## 2021-08-31 ASSESSMENT — MIFFLIN-ST. JEOR: SCORE: 1482.51

## 2021-08-31 NOTE — PATIENT INSTRUCTIONS

## 2021-08-31 NOTE — PROGRESS NOTES
[facility]  [address] [address] [address]  Phone: [phone]  Primary Provider: Carrie Delgado  Pre-op Performing Provider: NOELLE SORIANO      PREOPERATIVE EVALUATION:  Today's date: 8/31/2021    Heather Blakely is a 71 year old female who presents for a preoperative evaluation.    Surgical Information:  Surgery/Procedure: LEFT TOTAL HIP ARTHROPLASTY  Surgery Location: [facility]  Surgeon: Aryan Coker MD  Surgery Date: 9/14/21  Time of Surgery: 7:10am  Where patient plans to recover: At home with family  Fax number for surgical facility: Note does not need to be faxed, will be available electronically in Epic. [phone]    Type of Anesthesia Anticipated: Spinal    Assessment & Plan     The proposed surgical procedure is considered INTERMEDIATE risk.    Preop general physical exam      Palpitations  For cardiology  - TSH with free T4 reflex; Future    Left bundle branch block (LBBB)  Stable     Viral cardiomyopathy (H)  Stable     Elevated blood sugar  Recheck [id number]    Primary osteoarthritis of left hip  Needs replacing      Possible Sleep Apnea: diagnosed in 2018 does not have cpap  {       Risks and Recommendations:  The patient has the following additional risks and recommendations for perioperative complications:  Cardiovascular:   - Cardiology consulted cleared for surgery  Obstructive Sleep Apnea:   Not treated at present     Medication Instructions:   - ACE/ARB: HOLD due to exceptional risk of hypotension during surgery.    - ibuprofen (Advil, Motrin): HOLD 1 day before surgery.     RECOMMENDATION:  APPROVAL GIVEN to proceed with proposed procedure, without further diagnostic evaluation.                Subjective     HPI related to upcoming procedure: progressive left hip pain now needing to be replaced     Preop Questions 8/31/2021   1. Have you ever had a heart attack or stroke? No   2. Have you ever had surgery on your heart or blood  vessels, such as a stent placement, a coronary artery bypass, or surgery on an artery in your head, neck, heart, or legs? No   3. Do you have chest pain with activity? No   4. Do you have a history of  heart failure? YES - preserved ef cleared by cardiology for surgery    5. Do you currently have a cold, bronchitis or symptoms of other infection? No   6. Do you have a cough, shortness of breath, or wheezing? No   7. Do you or anyone in your family have previous history of blood clots? No   8. Do you or does anyone in your family have a serious bleeding problem such as prolonged bleeding following surgeries or cuts? No   9. Have you ever had problems with anemia or been told to take iron pills? No   10. Have you had any abnormal blood loss such as black, tarry or bloody stools, or abnormal vaginal bleeding? No   11. Have you ever had a blood transfusion? No   12. Are you willing to have a blood transfusion if it is medically needed before, during, or after your surgery? Yes   13. Have you or any of your relatives ever had problems with anesthesia? No   14. Do you have sleep apnea, excessive snoring or daytime drowsiness? YES - no cpap   14a. Do you have a CPAP machine? No   15. Do you have any artifical heart valves or other implanted medical devices like a pacemaker, defibrillator, or continuous glucose monitor? No   16. Do you have artificial joints? YES - knee replacement   17. Are you allergic to latex? No       Health Care Directive:  Patient does not have a Health Care Directive or Living Will: Discussed advance care planning with patient; information given to patient to review.    Preoperative Review of :   reviewed - no record of controlled substances prescribed.      Status of Chronic Conditions:  CHF - Patient has a longstanding history of moderate-severe CHF. Exacerbating conditions include viral . Currently the patient's condition is improving. Current treatment regimen includes ACE inhibitor and  beta blocker. The patient denies chest pain, edema, orthopnea, SOB or recent weight gain. Last Echocardiogram next week, EKG today .     SLEEP PROBLEM - Patient has a longstanding history of danyell.. Patient has tried OTC medications with limited success. Did not tolerate cpap      Review of Systems  Constitutional, neuro, ENT, endocrine, pulmonary, cardiac, gastrointestinal, genitourinary, musculoskeletal, integument and psychiatric systems are negative, except as otherwise noted.    Patient Active Problem List    Diagnosis Date Noted     Morbid obesity (H) 04/30/2021     Priority: Medium     Hyperlipidemia 01/21/2015     Priority: Medium     Formatting of this note might be different from the original.  Created by Conversion       Left bundle branch block (LBBB) 02/14/2013     Priority: Medium     Formatting of this note might be different from the original.  lbbb  Formatting of this note might be different from the original.  Created by Conversion  [facility] Annotation: Jun 25 2013  8:35Cris Martino: follows with   Cardiology Dr. Christian @ [facility]       Cardiomyopathy (H) 11/25/2009     Priority: Medium     Formatting of this note might be different from the original.  Non-ischemic  EJ 25-30%  LBBB  Normal angiogram 2005  Dr. Christian       Esophageal reflux 06/08/2007     Priority: Medium     Formatting of this note might be different from the original.  Created by Conversion        Past Medical History:   Diagnosis Date     Arthritis 1980     Past Surgical History:   Procedure Laterality Date     C LIGATE FALLOPIAN TUBE      Description: Tubal Ligation;  Recorded: 06/25/2013;     EYE SURGERY  Nov/Dec 2019 and May 2020    Cataracts and eye lift     HC REMOVAL GALLBLADDER      Description: Cholecystectomy;  Proc Date: 01/01/2000;     ORTHOPEDIC SURGERY  June 2007    knee replacement     CT ARTHROPLASTY TIBIAL PLATEAU      Description: Knee Replacement;  Recorded: 06/25/2013;  Comments: 1969 =  "\"reconstruction\" per pt     Current Outpatient Medications   Medication Sig Dispense Refill     carvedilol (COREG) 3.125 MG tablet TAKE 1 TABLET (3.125 MG TOTAL) BY MOUTH DAILY. 90 tablet 3     Cholecalciferol (VITAMIN D3 PO) Take 2,000 Units by mouth daily       lisinopril (ZESTRIL) 5 MG tablet Take 1 tablet (5 mg) by mouth daily 90 tablet 3       Allergies   Allergen Reactions     Sulfa Drugs         Social History     Tobacco Use     Smoking status: Never Smoker     Smokeless tobacco: Never Used   Substance Use Topics     Alcohol use: No       History   Drug Use No         Objective     /70 (BP Location: Right arm, Patient Position: Sitting, Cuff Size: Adult Large)   Pulse 89   Temp 98.1  F (36.7  C) (Tympanic)   Ht 1.6 m (5' 3\")   Wt 99.3 kg (219 lb)   SpO2 98%   BMI 38.79 kg/m      Physical Exam    GENERAL APPEARANCE: healthy, alert and no distress     EYES: EOMI, PERRL     HENT: ear canals and TM's normal and nose and mouth without ulcers or lesions     NECK: no adenopathy, no asymmetry, masses, or scars and thyroid normal to palpation     RESP: lungs clear to auscultation - no rales, rhonchi or wheezes     CV: regular rates and rhythm, normal S1 S2, no S3 or S4 and no murmur, click or rub     ABDOMEN:  soft, nontender, no HSM or masses and bowel sounds normal     MS: extremities normal- no gross deformities noted, no evidence of inflammation in joints, FROM in all extremities.     NEURO: Normal strength and tone, sensory exam grossly normal, mentation intact and speech normal     PSYCH: mentation appears normal. and affect normal/bright     LYMPHATICS: No cervical adenopathy    Recent Labs   Lab Test 04/30/21  0838 11/11/19  1322    143   POTASSIUM 4.4 4.1   CR 0.87 0.81   A1C 5.8*  --      Results for orders placed or performed in visit on 08/31/21   Hemoglobin A1c     Status: Abnormal   Result Value Ref Range    Hemoglobin A1C 5.7 (H) 0.0 - 5.6 %   CBC with platelets     Status: Normal "   Result Value Ref Range    WBC Count 7.9 4.0 - 11.0 10e3/uL    RBC Count 5.05 3.80 - 5.20 10e6/uL    Hemoglobin 14.7 11.7 - 15.7 g/dL    Hematocrit 44.7 35.0 - 47.0 %    MCV 89 78 - 100 fL    MCH 29.1 26.5 - 33.0 pg    MCHC 32.9 31.5 - 36.5 g/dL    RDW 13.7 10.0 - 15.0 %    Platelet Count 294 150 - 450 10e3/uL          Diagnostics:  Labs pending at this time.  Results will be reviewed when available.   No EKG this visit, completed in the last 90 days.    Revised Cardiac Risk Index (RCRI):  The patient has the following serious cardiovascular risks for perioperative complications:   - Congestive Heart Failure (pulmonary edema, PND, s3 priti, CXR with pulmonary congestion, basilar rales) = 1 point     RCRI Interpretation: 1 point: Class II (low risk - 0.9% complication rate)           Signed Electronically by: Caryn Canales MD  Copy of this evaluation report is provided to requesting physician.

## 2021-09-01 NOTE — RESULT ENCOUNTER NOTE
Heather,  Your lab results were normal/stable. Please feel free to my chart or call the office with questions. Caryn Canales M.D.

## 2021-09-08 RX ORDER — IBUPROFEN 200 MG
400 TABLET ORAL EVERY 4 HOURS PRN
COMMUNITY
End: 2021-09-30 | Stop reason: HOSPADM

## 2021-09-08 RX ORDER — CARBOXYMETHYLCELLULOSE SODIUM 5 MG/ML
1 SOLUTION/ DROPS OPHTHALMIC 3 TIMES DAILY PRN
COMMUNITY
End: 2021-09-30 | Stop reason: HOSPADM

## 2021-09-08 RX ORDER — SIMVASTATIN 10 MG
10 TABLET ORAL
COMMUNITY
End: 2021-09-08

## 2021-09-08 NOTE — PROVIDER NOTIFICATION
Plans to discharge on POD 1 in the morning with her .     09/08/21 0960   Discharge Planning   Patient/Family Anticipates Transition to home with family   Living Arrangements   People in home significant other;child(nathaly), adult   Type of Residence Private Residence   Is your private residence a single family home or apartment? Single family home   Number of Stairs, Within Home, Primary 5   Stair Railings, Within Home, Primary railing on left side (ascending)   Once home, are you able to live on one level? Yes   Which level? Main Level   Bathroom Shower/Tub Walk-in shower   Equipment Currently Used at Home grab bar, tub/shower  (has a walker at home)   Support System   Support Systems Spouse/Significant Other;Children  (, Jaguar, will be home with her and daughter, Audra, can help, too.)   Do you have someone available to stay with you one or two nights once you are home? Yes   Medical Clearance   It is recommended that you call and check with any specialty providers before surgery to see if you need surgical clearance.  Do you see any specialty providers outside of your primary care provider? Yes   What specialty providers does the patient see? Cardiologist   Blood   Known bleeding disorder or coagulopathy? No   Does the patient have any Voodoo/cultural preferences related to blood products? No   Education   Patient attended total joint pre-op class/received pre-op teaching  email/phone call

## 2021-09-10 ENCOUNTER — LAB (OUTPATIENT)
Dept: LAB | Facility: CLINIC | Age: 71
End: 2021-09-10
Attending: ORTHOPAEDIC SURGERY
Payer: COMMERCIAL

## 2021-09-10 DIAGNOSIS — Z11.59 ENCOUNTER FOR SCREENING FOR OTHER VIRAL DISEASES: ICD-10-CM

## 2021-09-10 PROCEDURE — U0005 INFEC AGEN DETEC AMPLI PROBE: HCPCS

## 2021-09-10 PROCEDURE — U0003 INFECTIOUS AGENT DETECTION BY NUCLEIC ACID (DNA OR RNA); SEVERE ACUTE RESPIRATORY SYNDROME CORONAVIRUS 2 (SARS-COV-2) (CORONAVIRUS DISEASE [COVID-19]), AMPLIFIED PROBE TECHNIQUE, MAKING USE OF HIGH THROUGHPUT TECHNOLOGIES AS DESCRIBED BY CMS-2020-01-R: HCPCS

## 2021-09-11 LAB — SARS-COV-2 RNA RESP QL NAA+PROBE: POSITIVE

## 2021-09-12 ENCOUNTER — TELEPHONE (OUTPATIENT)
Dept: SURGERY | Facility: CLINIC | Age: 71
End: 2021-09-12

## 2021-09-12 NOTE — SIGNIFICANT EVENT
I am evaluating this patient for upcoming Left Total Hip Arthroplasty with Dr. Coker at [facility] on 9/14/21:    - Patient was cleared to proceed with surgery by PCP and Cardiology however tested positive for Covid-19 on testing performed on 9/10/2021. Patient has already been notified per note in Epic. Surgery will need to cancelled for 9/14/21. Will notify surgery scheduling and Dr. Coker's office.     CARMEN Salvador, CNP   Advanced Practice Nurse Navigator- Orthopedics  [facility]   Office Phone: [phone]  Direct Fax: [phone]

## 2021-09-12 NOTE — TELEPHONE ENCOUNTER
"-Coronavirus (COVID-19) Notification    Caller Name (Patient, parent, daughter/son, grandparent, etc)  Patient    Reason for call  Notify of Positive Coronavirus (COVID-19) lab results, assess symptoms,  review  [employer] recommendations    Lab Result    Lab test:  2019-nCoV rRt-PCR or SARS-CoV-2 PCR    Oropharyngeal AND/OR nasopharyngeal swabs is POSITIVE for 2019-nCoV RNA/SARS-COV-2 PCR (COVID-19 virus)    RN Recommendations/Instructions per [facility] Coronavirus COVID-19 recommendations    Brief introduction script  Introduce self then review script:  \"I am calling on behalf of [employer].  We were notified that your Coronavirus test (COVID-19) for was POSITIVE for the virus.  I have some information to relay to you but first I wanted to mention that the [place] Dept of Health will be contacting you shortly [it's possible MD already called Patient] to talk to you more about how you are feeling and other people you have had contact with who might now also have the virus.  Also,  [employer] is Partnering with the [facility] for Covid-19 research, you may be contacted directly by research staff.\"    Assessment (Inquire about Patient's current symptoms)   Assessment   Current Symptoms at time of phone call: (if no symptoms, document No symptoms] None   Symptoms onset (if applicable) NA     If at time of call, Patients symptoms hare worsened, the Patient should contact 911 or have someone drive them to Emergency Dept promptly:      If Patient calling 911, inform 911 personal that you have tested positive for the Coronavirus (COVID-19).  Place mask on and await 911 to arrive.    If Emergency Dept, If possible, please have another adult drive you to the Emergency Dept but you need to wear mask when in contact with other people.      Monoclonal Antibody Administration    You may be eligible to receive a new treatment with a monoclonal antibody for preventing hospitalization in patients at " "high risk for complications from COVID-19.   This medication is still experimental and available on a limited basis; it is given through an IV and must be given at an infusion center. Please note that not all people who are eligible will receive the medication since it is in limited supply.     Are you interested in being considered for this medication?  No.   Does the patient fit the criteria: No    If patient qualifies based on above criteria:  \"You will be contacted if you are selected to receive this treatment in the next 1-2 business days.   This is time sensitive and if you are not selected in the next 1-2 business days, you will not receive the medication.  If you do not receive a call to schedule, you have not been selected.\"      Review information with Patient    Your result was positive. This means you have COVID-19 (coronavirus).  We have sent you a letter that reviews the information that I'll be reviewing with you now.    How can I protect others?    If you have symptoms: stay home and away from others (self-isolate) until:    You've had no fever--and no medicine that reduces fever--for 1 full day (24 hours). And       Your other symptoms have gotten better. For example, your cough or breathing has improved. And     At least 10 days have passed since your symptoms started. (If you've been told by a doctor that you have a weak immune system, wait 20 days.)     If you don't have symptoms: Stay home and away from others (self-isolate) until at least 10 days have passed since your first positive COVID-19 test. (Date test collected)    During this time:    Stay in your own room, including for meals. Use your own bathroom if you can.    Stay away from others in your home. No hugging, kissing or shaking hands. No visitors.     Don't go to work, school or anywhere else.     Clean  high touch  surfaces often (doorknobs, counters, handles, etc.). Use a household cleaning spray or wipes. You'll find a full list " on the EPA website at www.epa.gov/pesticide-registration/list-n-disinfectants-use-against-sars-cov-2.     Cover your mouth and nose with a mask, tissue or other face covering to avoid spreading germs.    Wash your hands and face often with soap and water.    Make a list of people you have been in close contact with recently, even if either of you wore a face covering.   ; Start your list from 2 days before you became ill or had a positive test.  ; Include anyone that was within 6 feet of you for a cumulative total of 15 minutes or more in 24 hours. (Example: if you sat next to Joe for 5 minutes in the morning and 10 minutes in the afternoon, then you were in close contact for 15 minutes total that day. Joe would be added to your list.)    A public health worker will call or text you. It is important that you answer. They will ask you questions about possible exposures to COVID-19, such as people you have been in direct contact with and places you have visited.    Tell the people on your list that you have COVID-19; they should stay away from others for 14 days starting from the last time they were in contact with you (unless you are told something different from a public health worker).     Caregivers in these groups are at risk for severe illness due to COVID-19:  o People 65 years and older  o People who live in a nursing home or long-term care facility  o People with chronic disease (lung, heart, cancer, diabetes, kidney, liver, immunologic)  o People who have a weakened immune system, including those who:  - Are in cancer treatment  - Take medicine that weakens the immune system, such as corticosteroids  - Had a bone marrow or organ transplant  - Have an immune deficiency  - Have poorly controlled HIV or AIDS  - Are obese (body mass index of 40 or higher)  - Smoke regularly    Caregivers should wear gloves while washing dishes, handling laundry and cleaning bedrooms and bathrooms.    Wash and dry laundry with  special caution. Don't shake dirty laundry, and use the warmest water setting you can.    If you have a weakened immune system, ask your doctor about other actions you should take.    For more tips, go to www.cdc.gov/coronavirus/2019-ncov/downloads/10Things.pdf.    You should not go back to work until you meet the guidelines above for ending your home isolation. You don't need to be retested for COVID-19 before going back to work--studies show that you won't spread the virus if it's been at least 10 days since your symptoms started (or 20 days, if you have a weak immune system).    Employers: This document serves as formal notice of your employee's medical guidelines for going back to work. They must meet the above guidelines before going back to work in person.    How can I take care of myself?    1. Get lots of rest. Drink extra fluids (unless a doctor has told you not to).    2. Take Tylenol (acetaminophen) for fever or pain. If you have liver or kidney problems, ask your family doctor if it's okay to take Tylenol.     Take either:     650 mg (two 325 mg pills) every 4 to 6 hours, or     1,000 mg (two 500 mg pills) every 8 hours as needed.     Note: Don't take more than 3,000 mg in one day. Acetaminophen is found in many medicines (both prescribed and over-the-counter medicines). Read all labels to be sure you don't take too much.    For children, check the Tylenol bottle for the right dose (based on their age or weight).    3. If you have other health problems (like cancer, heart failure, an organ transplant or severe kidney disease): Call your specialty clinic if you don't feel better in the next 2 days.    4. Know when to call 911: Emergency warning signs include:    Trouble breathing or shortness of breath    Pain or pressure in the chest that doesn't go away    Feeling confused like you haven't felt before, or not being able to wake up    Bluish-colored lips or face    5. Sign up for [employer]. We know  it's scary to hear that you have COVID-19. We want to track your symptoms to make sure you're okay over the next 2 weeks. Please look for an email from [employer]--this is a free, online program that we'll use to keep in touch. To sign up, follow the link in the email. Learn more at [email or web address].    Where can I get more information?    [facility]: [email or web address]    Coronavirus Basics: www.health.[facility].[place]./diseases/coronavirus/basics.html    What to Do If You're Sick: www.cdc.gov/coronavirus/2019-ncov/about/steps-when-sick.html    Ending Home Isolation: www.cdc.gov/coronavirus/2019-ncov/hcp/disposition-in-home-patients.html     Caring for Someone with COVID-19: www.cdc.gov/coronavirus/2019-ncov/if-you-are-sick/care-for-someone.html     [facility] clinical trials (COVID-19 research studies): clinicalaffairs.[email or web address]-clinical-trials     A Positive COVID-19 letter will be sent via [employer] or the mail. (Exception, no letters sent to Presurgerical/Preprocedure Patients)  Patient instructed to notify surgeon office, and discuss rescheduling of procedure.  Samantha Benz LPN

## 2021-09-26 ENCOUNTER — TRANSFERRED RECORDS (OUTPATIENT)
Dept: HEALTH INFORMATION MANAGEMENT | Facility: CLINIC | Age: 71
End: 2021-09-26

## 2021-09-29 ENCOUNTER — OFFICE VISIT (OUTPATIENT)
Dept: FAMILY MEDICINE | Facility: CLINIC | Age: 71
End: 2021-09-29
Payer: COMMERCIAL

## 2021-09-29 VITALS
TEMPERATURE: 97.9 F | SYSTOLIC BLOOD PRESSURE: 118 MMHG | HEIGHT: 63 IN | HEART RATE: 87 BPM | WEIGHT: 220 LBS | OXYGEN SATURATION: 98 % | DIASTOLIC BLOOD PRESSURE: 76 MMHG | BODY MASS INDEX: 38.98 KG/M2

## 2021-09-29 DIAGNOSIS — Z01.818 PREOP GENERAL PHYSICAL EXAM: Primary | ICD-10-CM

## 2021-09-29 DIAGNOSIS — B33.24 VIRAL CARDIOMYOPATHY (H): ICD-10-CM

## 2021-09-29 DIAGNOSIS — M16.12 ARTHRITIS OF LEFT HIP: ICD-10-CM

## 2021-09-29 DIAGNOSIS — K21.9 GASTROESOPHAGEAL REFLUX DISEASE WITHOUT ESOPHAGITIS: ICD-10-CM

## 2021-09-29 LAB
ANION GAP SERPL CALCULATED.3IONS-SCNC: 7 MMOL/L (ref 3–14)
BUN SERPL-MCNC: 16 MG/DL (ref 7–30)
CALCIUM SERPL-MCNC: 8.4 MG/DL (ref 8.5–10.1)
CHLORIDE BLD-SCNC: 104 MMOL/L (ref 94–109)
CO2 SERPL-SCNC: 27 MMOL/L (ref 20–32)
CREAT SERPL-MCNC: 0.85 MG/DL (ref 0.52–1.04)
ERYTHROCYTE [DISTWIDTH] IN BLOOD BY AUTOMATED COUNT: 13.7 % (ref 10–15)
GFR SERPL CREATININE-BSD FRML MDRD: 70 ML/MIN/1.73M2
GLUCOSE BLD-MCNC: 102 MG/DL (ref 70–99)
HCT VFR BLD AUTO: 42.7 % (ref 35–47)
HGB BLD-MCNC: 14.2 G/DL (ref 11.7–15.7)
MCH RBC QN AUTO: 29.1 PG (ref 26.5–33)
MCHC RBC AUTO-ENTMCNC: 33.3 G/DL (ref 31.5–36.5)
MCV RBC AUTO: 88 FL (ref 78–100)
PLATELET # BLD AUTO: 326 10E3/UL (ref 150–450)
POTASSIUM BLD-SCNC: 3.9 MMOL/L (ref 3.4–5.3)
RBC # BLD AUTO: 4.88 10E6/UL (ref 3.8–5.2)
SODIUM SERPL-SCNC: 138 MMOL/L (ref 133–144)
WBC # BLD AUTO: 7.7 10E3/UL (ref 4–11)

## 2021-09-29 PROCEDURE — 36415 COLL VENOUS BLD VENIPUNCTURE: CPT | Performed by: FAMILY MEDICINE

## 2021-09-29 PROCEDURE — 99214 OFFICE O/P EST MOD 30 MIN: CPT | Performed by: FAMILY MEDICINE

## 2021-09-29 PROCEDURE — 85027 COMPLETE CBC AUTOMATED: CPT | Performed by: FAMILY MEDICINE

## 2021-09-29 PROCEDURE — 80048 BASIC METABOLIC PNL TOTAL CA: CPT | Performed by: FAMILY MEDICINE

## 2021-09-29 RX ORDER — MULTIVITAMIN WITH IRON
1 TABLET ORAL DAILY
COMMUNITY

## 2021-09-29 ASSESSMENT — MIFFLIN-ST. JEOR: SCORE: 1487.04

## 2021-09-29 NOTE — PROGRESS NOTES
[facility]  [address] [address] [address]  Phone: [phone]  Primary Provider: Carrie Delgado  Pre-op Performing Provider: NOELLE SORIANO    PREOPERATIVE EVALUATION:  Today's date: 9/29/2021    Heather Blakely is a 71 year old female who presents for a preoperative evaluation.    Surgical Information:  Surgery/Procedure: LEFT TOTAL HIP ARTHROPLASTY  Surgery Location: [facility]  Surgeon: Aryan Coker MD  Surgery Date: 10/4/21  Time of Surgery: 9:30am  Where patient plans to recover: At home with family  Fax number for surgical facility: [phone]    Type of Anesthesia Anticipated: to be determined probably spinal     Assessment & Plan     The proposed surgical procedure is considered INTERMEDIATE risk.    Preop general physical exam      Arthritis of left hip      Gastroesophageal reflux disease without esophagitis    - CBC with platelets; Future    Viral cardiomyopathy (H)    - Basic metabolic panel  (Ca, Cl, CO2, Creat, Gluc, K, Na, BUN); Future    Possible Sleep Apnea:  Has danyell does not use any treatment          Risks and Recommendations:  The patient has the following additional risks and recommendations for perioperative complications:  Obstructive Sleep Apnea:      - ibuprofen (Advil, Motrin): HOLD 1 day before surgery. Medication Instructions:   - ACE/ARB: HOLD due to exceptional risk of hypotension during surgery.    - Beta Blockers: Continue taking on the day of surgery.   - ibuprofen (Advil, Motrin): HOLD 1 week  before surgery.     RECOMMENDATION:  APPROVAL GIVEN to proceed with proposed procedure, without further diagnostic evaluation.                Subjective     HPI related to upcoming procedure: chronic left hip pain     Preop Questions 9/29/2021   1. Have you ever had a heart attack or stroke? No   2. Have you ever had surgery on your heart or blood vessels, such as a stent placement, a coronary artery bypass, or surgery on  an artery in your head, neck, heart, or legs? No   3. Do you have chest pain with activity? No   4. Do you have a history of  heart failure? No   5. Do you currently have a cold, bronchitis or symptoms of other infection? No   6. Do you have a cough, shortness of breath, or wheezing? No   7. Do you or anyone in your family have previous history of blood clots? No   8. Do you or does anyone in your family have a serious bleeding problem such as prolonged bleeding following surgeries or cuts? No   9. Have you ever had problems with anemia or been told to take iron pills? No   10. Have you had any abnormal blood loss such as black, tarry or bloody stools, or abnormal vaginal bleeding? No   11. Have you ever had a blood transfusion? No   12. Are you willing to have a blood transfusion if it is medically needed before, during, or after your surgery? NO -    13. Have you or any of your relatives ever had problems with anesthesia? No   14. Do you have sleep apnea, excessive snoring or daytime drowsiness? YES -    14a. Do you have a CPAP machine? Yes but not using    15. Do you have any artifical heart valves or other implanted medical devices like a pacemaker, defibrillator, or continuous glucose monitor? No   16. Do you have artificial joints? YES - right knee    17. Are you allergic to latex? No       Health Care Directive:  Patient does not have a Health Care Directive or Living Will: Advance Directive received and scanned. Click on Code in the patient header to view.    Preoperative Review of :   reviewed - no record of controlled substances prescribed.      Status of Chronic Conditions:  See problem list for active medical problems.  Problems all longstanding and stable, except as noted/documented.  See ROS for pertinent symptoms related to these conditions.  Just saw cardiology     Review of Systems  Constitutional, neuro, ENT, endocrine, pulmonary, cardiac, gastrointestinal, genitourinary, musculoskeletal,  "integument and psychiatric systems are negative, except as otherwise noted.    Patient Active Problem List    Diagnosis Date Noted     Morbid obesity (H) 04/30/2021     Priority: Medium     Hyperlipidemia 01/21/2015     Priority: Medium     Formatting of this note might be different from the original.  Created by Conversion       Left bundle branch block (LBBB) 02/14/2013     Priority: Medium     Formatting of this note might be different from the original.  lbbb  Formatting of this note might be different from the original.  Created by Conversion  [facility] Annotation: Jun 25 2013  8:35Cris Martino: follows with   Cardiology Dr. Christian @ [facility]       Cardiomyopathy (H) 11/25/2009     Priority: Medium     Formatting of this note might be different from the original.  Non-ischemic  EJ 25-30%  LBBB  Normal angiogram 2005  Dr. Christian       Esophageal reflux 06/08/2007     Priority: Medium     Formatting of this note might be different from the original.  Created by Conversion        Past Medical History:   Diagnosis Date     Arthritis 1980     Blood glucose elevated      Cardiomyopathy (H)      Congestive heart failure (H)      Gastroesophageal reflux disease      Hyperlipidemia      LBBB (left bundle branch block)      Palpitations      PONV (postoperative nausea and vomiting)      Sleep apnea      Past Surgical History:   Procedure Laterality Date     C LIGATE FALLOPIAN TUBE      Description: Tubal Ligation;  Recorded: 06/25/2013;     EYE SURGERY  Nov/Dec 2019 and May 2020    Cataracts and eye lift     HC REMOVAL GALLBLADDER      Description: Cholecystectomy;  Proc Date: 01/01/2000;     ORTHOPEDIC SURGERY  June 2007    knee replacement     [place] ARTHROPLASTY TIBIAL PLATEAU      Description: Knee Replacement;  Recorded: 06/25/2013;  Comments: 1969 = \"reconstruction\" per pt     Current Outpatient Medications   Medication Sig Dispense Refill     carboxymethylcellulose PF (REFRESH PLUS) 0.5 % ophthalmic " "solution 1 drop 3 times daily as needed for dry eyes       carvedilol (COREG) 3.125 MG tablet TAKE 1 TABLET (3.125 MG TOTAL) BY MOUTH DAILY. 90 tablet 3     Cholecalciferol (VITAMIN D3 PO) Take 2,000 Units by mouth daily       ibuprofen (ADVIL/MOTRIN) 200 MG tablet Take 400 mg by mouth every 4 hours as needed for mild pain Stopped 21       lisinopril (ZESTRIL) 5 MG tablet Take 1 tablet (5 mg) by mouth daily 90 tablet 3     magnesium 250 MG tablet Take 1 tablet by mouth daily       MAGNESIUM GLYCINATE PO Take 200 mg by mouth daily        Probiotic Product (PROBIOTIC DAILY PO)          Allergies   Allergen Reactions     Sulfa Drugs Unknown     Reaction as a child        Social History     Tobacco Use     Smoking status: Former Smoker     Packs/day: 0.00     Years: 0.00     Pack years: 0.00     Quit date:      Years since quittin.7     Smokeless tobacco: Never Used   Substance Use Topics     Alcohol use: No       History   Drug Use No         Objective     /76 (BP Location: Right arm, Patient Position: Sitting, Cuff Size: Adult Large)   Pulse 87   Temp 97.9  F (36.6  C) (Tympanic)   Ht 1.6 m (5' 3\")   Wt 99.8 kg (220 lb)   SpO2 98%   BMI 38.97 kg/m      Physical Exam    GENERAL APPEARANCE: healthy, alert and no distress     EYES: EOMI, PERRL     NECK: no adenopathy, no asymmetry, masses, or scars and thyroid normal to palpation     RESP: lungs clear to auscultation - no rales, rhonchi or wheezes     CV: regular rates and rhythm, normal S1 S2, no S3 or S4 and no murmur, click or rub     NEURO: Normal strength and tone, sensory exam grossly normal, mentation intact and speech normal     PSYCH: mentation appears normal. and affect normal/bright    Recent Labs   Lab Test 21  1357 21  0838   HGB 14.7  --      --     140   POTASSIUM 4.3 4.4   CR 0.92 0.87   A1C 5.7* 5.8*      Results for orders placed or performed in visit on 21   Basic metabolic panel  (Ca, Cl, CO2, " Creat, Gluc, K, Na, BUN)     Status: Abnormal   Result Value Ref Range    Sodium 138 133 - 144 mmol/L    Potassium 3.9 3.4 - 5.3 mmol/L    Chloride 104 94 - 109 mmol/L    Carbon Dioxide (CO2) 27 20 - 32 mmol/L    Anion Gap 7 3 - 14 mmol/L    Urea Nitrogen 16 7 - 30 mg/dL    Creatinine 0.85 0.52 - 1.04 mg/dL    Calcium 8.4 (L) 8.5 - 10.1 mg/dL    Glucose 102 (H) 70 - 99 mg/dL    GFR Estimate 70 >60 mL/min/1.73m2   CBC with platelets     Status: Normal   Result Value Ref Range    WBC Count 7.7 4.0 - 11.0 10e3/uL    RBC Count 4.88 3.80 - 5.20 10e6/uL    Hemoglobin 14.2 11.7 - 15.7 g/dL    Hematocrit 42.7 35.0 - 47.0 %    MCV 88 78 - 100 fL    MCH 29.1 26.5 - 33.0 pg    MCHC 33.3 31.5 - 36.5 g/dL    RDW 13.7 10.0 - 15.0 %    Platelet Count 326 150 - 450 10e3/uL       Diagnostics:  Labs pending at this time.  Results will be reviewed when available.   No EKG this visit, completed in the last 90 days.    Revised Cardiac Risk Index (RCRI):  The patient has the following serious cardiovascular risks for perioperative complications:   - No serious cardiac risks = 0 points     RCRI Interpretation: 0 points: Class I (very low risk - 0.4% complication rate)           Signed Electronically by: Caryn Canales MD  Copy of this evaluation report is provided to requesting physician.

## 2021-09-29 NOTE — PATIENT INSTRUCTIONS

## 2021-10-02 ENCOUNTER — HEALTH MAINTENANCE LETTER (OUTPATIENT)
Age: 71
End: 2021-10-02

## 2021-10-04 ENCOUNTER — TRANSFERRED RECORDS (OUTPATIENT)
Dept: HEALTH INFORMATION MANAGEMENT | Facility: CLINIC | Age: 71
End: 2021-10-04

## 2021-10-18 ENCOUNTER — TRANSFERRED RECORDS (OUTPATIENT)
Dept: HEALTH INFORMATION MANAGEMENT | Facility: CLINIC | Age: 71
End: 2021-10-18

## 2021-11-17 ENCOUNTER — TRANSFERRED RECORDS (OUTPATIENT)
Dept: HEALTH INFORMATION MANAGEMENT | Facility: CLINIC | Age: 71
End: 2021-11-17
Payer: COMMERCIAL

## 2022-05-01 DIAGNOSIS — I42.9 CARDIOMYOPATHY, UNSPECIFIED TYPE (H): ICD-10-CM

## 2022-05-02 RX ORDER — LISINOPRIL 5 MG/1
TABLET ORAL
Qty: 90 TABLET | Refills: 0 | Status: SHIPPED | OUTPATIENT
Start: 2022-05-02 | End: 2022-07-28

## 2022-05-24 DIAGNOSIS — I42.9 CARDIOMYOPATHY, UNSPECIFIED TYPE (H): ICD-10-CM

## 2022-05-24 RX ORDER — CARVEDILOL 3.12 MG/1
TABLET ORAL
Qty: 90 TABLET | Refills: 0 | Status: SHIPPED | OUTPATIENT
Start: 2022-05-24 | End: 2022-08-22

## 2022-06-28 PROBLEM — Z79.890 NEED FOR PROPHYLACTIC HORMONE REPLACEMENT THERAPY (POSTMENOPAUSAL): Status: ACTIVE | Noted: 2022-06-28

## 2022-06-28 PROBLEM — E55.9 VITAMIN D DEFICIENCY: Status: ACTIVE | Noted: 2022-06-28

## 2022-06-28 PROBLEM — R07.89 OTHER CHEST PAIN: Status: ACTIVE | Noted: 2022-06-28

## 2022-06-28 PROBLEM — J30.9 ALLERGIC RHINITIS: Status: ACTIVE | Noted: 2022-06-28

## 2022-06-28 PROBLEM — R53.81 OTHER MALAISE AND FATIGUE: Status: ACTIVE | Noted: 2022-06-28

## 2022-06-28 PROBLEM — M25.50 PAIN IN JOINT: Status: ACTIVE | Noted: 2022-06-28

## 2022-06-28 PROBLEM — R53.83 OTHER MALAISE AND FATIGUE: Status: ACTIVE | Noted: 2022-06-28

## 2022-06-28 RX ORDER — ASPIRIN 81 MG/1
TABLET, COATED ORAL
COMMUNITY
Start: 2021-10-05 | End: 2022-12-23

## 2022-06-28 RX ORDER — IBUPROFEN 800 MG/1
TABLET, FILM COATED ORAL
COMMUNITY
Start: 2021-10-05 | End: 2024-02-07

## 2022-06-28 RX ORDER — HYDROXYZINE PAMOATE 25 MG/1
CAPSULE ORAL
COMMUNITY
Start: 2021-10-05 | End: 2022-12-23

## 2022-06-28 RX ORDER — OXYCODONE HYDROCHLORIDE 5 MG/1
TABLET ORAL
COMMUNITY
Start: 2021-10-05 | End: 2022-12-23

## 2022-06-29 ENCOUNTER — OFFICE VISIT (OUTPATIENT)
Dept: FAMILY MEDICINE | Facility: CLINIC | Age: 72
End: 2022-06-29
Payer: COMMERCIAL

## 2022-06-29 VITALS
OXYGEN SATURATION: 96 % | DIASTOLIC BLOOD PRESSURE: 72 MMHG | BODY MASS INDEX: 38.09 KG/M2 | TEMPERATURE: 97.5 F | HEIGHT: 63 IN | HEART RATE: 84 BPM | SYSTOLIC BLOOD PRESSURE: 112 MMHG | WEIGHT: 215 LBS

## 2022-06-29 DIAGNOSIS — E66.01 MORBID OBESITY (H): ICD-10-CM

## 2022-06-29 DIAGNOSIS — I50.9 CONGESTIVE HEART FAILURE, UNSPECIFIED HF CHRONICITY, UNSPECIFIED HEART FAILURE TYPE (H): ICD-10-CM

## 2022-06-29 DIAGNOSIS — Z00.00 HEALTHCARE MAINTENANCE: ICD-10-CM

## 2022-06-29 DIAGNOSIS — Z13.220 SCREENING FOR HYPERLIPIDEMIA: ICD-10-CM

## 2022-06-29 DIAGNOSIS — R21 RASH OF FOOT: ICD-10-CM

## 2022-06-29 DIAGNOSIS — Z12.11 SCREEN FOR COLON CANCER: ICD-10-CM

## 2022-06-29 DIAGNOSIS — R73.09 OTHER ABNORMAL GLUCOSE: ICD-10-CM

## 2022-06-29 DIAGNOSIS — Z12.11 COLON CANCER SCREENING: Primary | ICD-10-CM

## 2022-06-29 DIAGNOSIS — Z13.1 SCREENING FOR DIABETES MELLITUS: ICD-10-CM

## 2022-06-29 LAB
ALBUMIN SERPL-MCNC: 3.5 G/DL (ref 3.4–5)
ALP SERPL-CCNC: 66 U/L (ref 40–150)
ALT SERPL W P-5'-P-CCNC: 24 U/L (ref 0–50)
ANION GAP SERPL CALCULATED.3IONS-SCNC: 5 MMOL/L (ref 3–14)
AST SERPL W P-5'-P-CCNC: 13 U/L (ref 0–45)
BILIRUB SERPL-MCNC: 0.4 MG/DL (ref 0.2–1.3)
BUN SERPL-MCNC: 14 MG/DL (ref 7–30)
CALCIUM SERPL-MCNC: 9.1 MG/DL (ref 8.5–10.1)
CHLORIDE BLD-SCNC: 107 MMOL/L (ref 94–109)
CHOLEST SERPL-MCNC: 222 MG/DL
CO2 SERPL-SCNC: 28 MMOL/L (ref 20–32)
CREAT SERPL-MCNC: 0.8 MG/DL (ref 0.52–1.04)
FASTING STATUS PATIENT QL REPORTED: YES
GFR SERPL CREATININE-BSD FRML MDRD: 78 ML/MIN/1.73M2
GLUCOSE BLD-MCNC: 96 MG/DL (ref 70–99)
HBA1C MFR BLD: 5.6 % (ref 0–5.6)
HDLC SERPL-MCNC: 59 MG/DL
LDLC SERPL CALC-MCNC: 139 MG/DL
NONHDLC SERPL-MCNC: 163 MG/DL
POTASSIUM BLD-SCNC: 4.3 MMOL/L (ref 3.4–5.3)
PROT SERPL-MCNC: 7.5 G/DL (ref 6.8–8.8)
SODIUM SERPL-SCNC: 140 MMOL/L (ref 133–144)
TRIGL SERPL-MCNC: 122 MG/DL

## 2022-06-29 PROCEDURE — 83036 HEMOGLOBIN GLYCOSYLATED A1C: CPT | Performed by: FAMILY MEDICINE

## 2022-06-29 PROCEDURE — 80061 LIPID PANEL: CPT | Performed by: FAMILY MEDICINE

## 2022-06-29 PROCEDURE — 99214 OFFICE O/P EST MOD 30 MIN: CPT | Performed by: FAMILY MEDICINE

## 2022-06-29 PROCEDURE — 80053 COMPREHEN METABOLIC PANEL: CPT | Performed by: FAMILY MEDICINE

## 2022-06-29 PROCEDURE — 36415 COLL VENOUS BLD VENIPUNCTURE: CPT | Performed by: FAMILY MEDICINE

## 2022-06-29 RX ORDER — TRIAMCINOLONE ACETONIDE 1 MG/G
CREAM TOPICAL 2 TIMES DAILY
Qty: 45 G | Refills: 1 | Status: SHIPPED | OUTPATIENT
Start: 2022-06-29 | End: 2024-02-07

## 2022-06-29 NOTE — PROGRESS NOTES
Assessment/Plan:    Heather Blakely is a 72 year old female presenting for:    Rash of foot  Appears to be an irritant type rash given picture that she showed me on her phone.  Triamcinolone sent to the pharmacy.  - triamcinolone (KENALOG) 0.1 % external cream  Dispense: 45 g; Refill: 1    Screening for diabetes mellitus  Family history of diabetes.  - Hemoglobin A1c  - Hemoglobin A1c    Congestive heart failure, unspecified HF chronicity, unspecified heart failure type (H)  Follows with cardiology  - Comprehensive metabolic panel (BMP + Alb, Alk Phos, ALT, AST, Total. Bili, TP)  - Comprehensive metabolic panel (BMP + Alb, Alk Phos, ALT, AST, Total. Bili, TP)    Screen for colon cancer  Cologuard ordered    Screening for hyperlipidemia  - Lipid panel reflex to direct LDL Fasting  - Lipid panel reflex to direct LDL Fasting    Colon cancer screening  - COLOGUARD(EXACT SCIENCES)    Healthcare maintenance  - REVIEW OF HEALTH MAINTENANCE PROTOCOL ORDERS    Other abnormal glucose   - Hemoglobin A1c  - Hemoglobin A1c    Morbid obesity (H)  Patient has been working on weight loss.      There are no discontinued medications.        Chief Complaint:  Rash        Subjective:   Heather Blakely is a very pleasant 72-year-old female presenting to the clinic today for concerns over a rash on her right foot.    Onset/Duration: x off and on for a year -generally occurs once or twice a month and last 3 to 4 days.  She cannot think any exacerbating factors.  Foot becomes a bit bumpy and hurts.  No rash between toes.  She has tried over-the-counter hydrocortisone which has not seemed to help.  It is somewhat itchy but mostly just painful.  It is not associated with swelling.    Description  Location: feet  Character: painful, burning, red  Itching: no  Intensity:  moderate  Progression of Symptoms:  same and intermittent  Accompanying signs and symptoms:   Fever: no  Body aches or joint pain: no  Sore throat symptoms: no  Recent cold  "symptoms: no  History:           Previous episodes of similar rash: yes   New exposures:  None  Recent travel: no  Exposure to similar rash: YES  Precipitating or alleviating factors:   Therapies tried and outcome: hydrocortisone cream -  not effective    She is otherwise doing well.  She is fasting today and wondering if she can get some blood work done.      12 point review of systems completed and negative except for what has been described above    History   Smoking Status     Former Smoker     Packs/day: 0.00     Years: 0.00     Quit date: 1975   Smokeless Tobacco     Never Used         Current Outpatient Medications:      ASPIRIN LOW DOSE 81 MG EC tablet, TAKE 1 TABLET BY MOUTH TWICE A DAY, Disp: , Rfl:      carvedilol (COREG) 3.125 MG tablet, TAKE 1 TABLET (3.125 MG TOTAL) BY MOUTH DAILY., Disp: 90 tablet, Rfl: 0     Cholecalciferol (VITAMIN D3 PO), Take 2,000 Units by mouth daily, Disp: , Rfl:      esomeprazole (NEXIUM) 20 MG DR capsule, , Disp: , Rfl:      hydrOXYzine (VISTARIL) 25 MG capsule, TAKE 1 TO 2 CAPSULES BY MOUTH EVERY 4 TO 6 HOURS AS NEEDED FOR PAIN, Disp: , Rfl:      ibuprofen (ADVIL/MOTRIN) 800 MG tablet, TAKE 1 TABLET BY MOUTH THREE TIMES A DAY AS NEEDED FOR PAIN, Disp: , Rfl:      lisinopril (ZESTRIL) 5 MG tablet, TAKE 1 TABLET BY MOUTH EVERY DAY, Disp: 90 tablet, Rfl: 0     magnesium 250 MG tablet, Take 1 tablet by mouth daily, Disp: , Rfl:      oxyCODONE (ROXICODONE) 5 MG tablet, TAKE 1 TO 2 TABLETS BY MOUTH EVERY 4 TO 6 HOURS AS NEEDED FOR PAIN, Disp: , Rfl:      Probiotic Product (PROBIOTIC DAILY PO), , Disp: , Rfl:      triamcinolone (KENALOG) 0.1 % external cream, Apply topically 2 times daily, Disp: 45 g, Rfl: 1      Objective:  Vitals:    06/29/22 0702   BP: 112/72   BP Location: Right arm   Patient Position: Sitting   Cuff Size: Adult Large   Pulse: 84   Temp: 97.5  F (36.4  C)   TempSrc: Tympanic   SpO2: 96%   Weight: 97.5 kg (215 lb)   Height: 1.6 m (5' 3\")       Body mass index is " 38.09 kg/m .    Vital signs reviewed and stable  General: No acute distress  Psych: Appropriate affect  HEENT: moist mucous membranes, pupils equal, round, reactive to light and accomodation, tympanic membranes are pearly grey bilaterally  Lymph: no cervical or supraclavicular lymphadenopathy  Cardiovascular: regular rate and rhythm with no murmur  Pulmonary: clear to auscultation bilaterally with no wheeze  Abdomen: soft, non tender, non distended with normo-active bowel sounds  Extremities: warm and well perfused with no edema  Skin: warm and dry with no rash -she shows me a picture on her phone of her right foot with a papular erythematous type pinpoint rash.  This is located on the top of her foot.         This note has been dictated and transcribed using voice recognition software.   Any errors in transcription are unintentional and inherent to the software.    Rash  Onset/Duration: x off and on for a year - feet go numb all the time - sore at time of foot  Description  Location: feet  Character: painful, burning, red  Itching: no  Intensity:  moderate  Progression of Symptoms:  same and intermittent  Accompanying signs and symptoms:   Fever: no  Body aches or joint pain: no  Sore throat symptoms: no  Recent cold symptoms: no  History:           Previous episodes of similar rash: yes   New exposures:  None  Recent travel: no  Exposure to similar rash: YES  Precipitating or alleviating factors:   Therapies tried and outcome: hydrocortisone cream -  not effective        ..  Answers for HPI/ROS submitted by the patient on 6/25/2022  Nitroglycerin use:: never  Do you take an aspirin every day?: No  What is the reason for your visit today? : acid reflux and numbness/rash in feet  How many servings of fruits and vegetables do you eat daily?: 2-3  On average, how many sweetened beverages do you drink each day (Examples: soda, juice, sweet tea, etc.  Do NOT count diet or artificially sweetened beverages)?: 0  How many  minutes a day do you exercise enough to make your heart beat faster?: 20 to 29  How many days a week do you exercise enough to make your heart beat faster?: 4  How many days per week do you miss taking your medication?: 0

## 2022-07-08 ENCOUNTER — HOSPITAL ENCOUNTER (OUTPATIENT)
Dept: MAMMOGRAPHY | Facility: CLINIC | Age: 72
Discharge: HOME | End: 2022-07-08
Attending: FAMILY MEDICINE | Admitting: FAMILY MEDICINE
Payer: COMMERCIAL

## 2022-07-08 DIAGNOSIS — Z12.31 VISIT FOR SCREENING MAMMOGRAM: ICD-10-CM

## 2022-07-08 PROCEDURE — 77067 SCR MAMMO BI INCL CAD: CPT

## 2022-07-09 ENCOUNTER — HEALTH MAINTENANCE LETTER (OUTPATIENT)
Age: 72
End: 2022-07-09

## 2022-07-28 DIAGNOSIS — I42.9 CARDIOMYOPATHY, UNSPECIFIED TYPE (H): ICD-10-CM

## 2022-07-28 RX ORDER — LISINOPRIL 5 MG/1
TABLET ORAL
Qty: 90 TABLET | Refills: 0 | Status: SHIPPED | OUTPATIENT
Start: 2022-07-28 | End: 2022-12-21

## 2022-07-28 NOTE — TELEPHONE ENCOUNTER
Prescription approved per [facility] Refill Protocol.  Rosalinda Adler RN

## 2022-08-15 ENCOUNTER — TRANSFERRED RECORDS (OUTPATIENT)
Dept: HEALTH INFORMATION MANAGEMENT | Facility: CLINIC | Age: 72
End: 2022-08-15

## 2022-08-20 DIAGNOSIS — I42.9 CARDIOMYOPATHY, UNSPECIFIED TYPE (H): ICD-10-CM

## 2022-08-22 RX ORDER — CARVEDILOL 3.12 MG/1
TABLET ORAL
Qty: 90 TABLET | Refills: 0 | Status: SHIPPED | OUTPATIENT
Start: 2022-08-22 | End: 2022-11-25

## 2022-08-23 LAB — NONINV COLON CA DNA+OCC BLD SCRN STL QL: NEGATIVE

## 2022-09-03 ENCOUNTER — HEALTH MAINTENANCE LETTER (OUTPATIENT)
Age: 72
End: 2022-09-03

## 2022-10-04 PROBLEM — M17.10 UNILATERAL PRIMARY OSTEOARTHRITIS, UNSPECIFIED KNEE: Status: ACTIVE | Noted: 2022-06-28

## 2022-10-05 ENCOUNTER — TRANSFERRED RECORDS (OUTPATIENT)
Dept: HEALTH INFORMATION MANAGEMENT | Facility: CLINIC | Age: 72
End: 2022-10-05

## 2022-11-23 DIAGNOSIS — I42.9 CARDIOMYOPATHY, UNSPECIFIED TYPE (H): ICD-10-CM

## 2022-11-25 RX ORDER — CARVEDILOL 3.12 MG/1
TABLET ORAL
Qty: 90 TABLET | Refills: 1 | Status: SHIPPED | OUTPATIENT
Start: 2022-11-25 | End: 2023-05-26

## 2022-12-21 RX ORDER — LISINOPRIL 2.5 MG/1
2.5 TABLET ORAL DAILY
COMMUNITY
Start: 2022-10-31

## 2022-12-21 ASSESSMENT — ENCOUNTER SYMPTOMS
CHILLS: 0
DIARRHEA: 0
ABDOMINAL PAIN: 0
NAUSEA: 0
HEMATURIA: 0
PARESTHESIAS: 0
PALPITATIONS: 0
HEADACHES: 0
SORE THROAT: 0
WEAKNESS: 0
EYE PAIN: 0
FREQUENCY: 0
NERVOUS/ANXIOUS: 0
MYALGIAS: 0
SHORTNESS OF BREATH: 0
COUGH: 0
CONSTIPATION: 0
DIZZINESS: 0
FEVER: 0
ARTHRALGIAS: 0
HEARTBURN: 0
JOINT SWELLING: 0
HEMATOCHEZIA: 0
BREAST MASS: 0
DYSURIA: 0

## 2022-12-21 ASSESSMENT — ACTIVITIES OF DAILY LIVING (ADL): CURRENT_FUNCTION: NO ASSISTANCE NEEDED

## 2022-12-23 ENCOUNTER — OFFICE VISIT (OUTPATIENT)
Dept: FAMILY MEDICINE | Facility: CLINIC | Age: 72
End: 2022-12-23
Payer: COMMERCIAL

## 2022-12-23 VITALS
WEIGHT: 211.13 LBS | DIASTOLIC BLOOD PRESSURE: 70 MMHG | TEMPERATURE: 97.3 F | HEIGHT: 63 IN | HEART RATE: 74 BPM | RESPIRATION RATE: 16 BRPM | BODY MASS INDEX: 37.41 KG/M2 | SYSTOLIC BLOOD PRESSURE: 112 MMHG | OXYGEN SATURATION: 97 %

## 2022-12-23 DIAGNOSIS — I50.9 CONGESTIVE HEART FAILURE, UNSPECIFIED HF CHRONICITY, UNSPECIFIED HEART FAILURE TYPE (H): ICD-10-CM

## 2022-12-23 DIAGNOSIS — Z78.0 POST-MENOPAUSAL: ICD-10-CM

## 2022-12-23 DIAGNOSIS — E55.9 VITAMIN D DEFICIENCY: ICD-10-CM

## 2022-12-23 DIAGNOSIS — Z00.00 ENCOUNTER FOR MEDICARE ANNUAL WELLNESS EXAM: Primary | ICD-10-CM

## 2022-12-23 DIAGNOSIS — E66.01 MORBID OBESITY (H): ICD-10-CM

## 2022-12-23 DIAGNOSIS — Z13.220 SCREENING FOR HYPERLIPIDEMIA: ICD-10-CM

## 2022-12-23 LAB
ANION GAP SERPL CALCULATED.3IONS-SCNC: 7 MMOL/L (ref 7–15)
BUN SERPL-MCNC: 16.3 MG/DL (ref 8–23)
CALCIUM SERPL-MCNC: 9.6 MG/DL (ref 8.8–10.2)
CHLORIDE SERPL-SCNC: 105 MMOL/L (ref 98–107)
CHOLEST SERPL-MCNC: 214 MG/DL
CREAT SERPL-MCNC: 0.82 MG/DL (ref 0.51–0.95)
DEPRECATED HCO3 PLAS-SCNC: 29 MMOL/L (ref 22–29)
ERYTHROCYTE [DISTWIDTH] IN BLOOD BY AUTOMATED COUNT: 13.6 % (ref 10–15)
GFR SERPL CREATININE-BSD FRML MDRD: 76 ML/MIN/1.73M2
GLUCOSE SERPL-MCNC: 95 MG/DL (ref 70–99)
HCT VFR BLD AUTO: 42.3 % (ref 35–47)
HDLC SERPL-MCNC: 69 MG/DL
HGB BLD-MCNC: 13.3 G/DL (ref 11.7–15.7)
LDLC SERPL CALC-MCNC: 118 MG/DL
MCH RBC QN AUTO: 28.1 PG (ref 26.5–33)
MCHC RBC AUTO-ENTMCNC: 31.4 G/DL (ref 31.5–36.5)
MCV RBC AUTO: 89 FL (ref 78–100)
NONHDLC SERPL-MCNC: 145 MG/DL
PLATELET # BLD AUTO: 296 10E3/UL (ref 150–450)
POTASSIUM SERPL-SCNC: 4.5 MMOL/L (ref 3.4–5.3)
RBC # BLD AUTO: 4.74 10E6/UL (ref 3.8–5.2)
SODIUM SERPL-SCNC: 141 MMOL/L (ref 136–145)
TRIGL SERPL-MCNC: 135 MG/DL
WBC # BLD AUTO: 6.5 10E3/UL (ref 4–11)

## 2022-12-23 PROCEDURE — 36415 COLL VENOUS BLD VENIPUNCTURE: CPT | Performed by: FAMILY MEDICINE

## 2022-12-23 PROCEDURE — 99213 OFFICE O/P EST LOW 20 MIN: CPT | Mod: 25 | Performed by: FAMILY MEDICINE

## 2022-12-23 PROCEDURE — 80048 BASIC METABOLIC PNL TOTAL CA: CPT | Performed by: FAMILY MEDICINE

## 2022-12-23 PROCEDURE — 85027 COMPLETE CBC AUTOMATED: CPT | Performed by: FAMILY MEDICINE

## 2022-12-23 PROCEDURE — 82306 VITAMIN D 25 HYDROXY: CPT | Performed by: FAMILY MEDICINE

## 2022-12-23 PROCEDURE — 80061 LIPID PANEL: CPT | Performed by: FAMILY MEDICINE

## 2022-12-23 PROCEDURE — G0439 PPPS, SUBSEQ VISIT: HCPCS | Performed by: FAMILY MEDICINE

## 2022-12-23 RX ORDER — AMOXICILLIN 500 MG/1
CAPSULE ORAL
COMMUNITY
Start: 2022-12-06 | End: 2024-02-07

## 2022-12-23 ASSESSMENT — ENCOUNTER SYMPTOMS
BREAST MASS: 0
COUGH: 0
ABDOMINAL PAIN: 0
HEARTBURN: 0
PARESTHESIAS: 0
PALPITATIONS: 0
JOINT SWELLING: 0
SHORTNESS OF BREATH: 0
FREQUENCY: 0
MYALGIAS: 0
HEMATURIA: 0
HEMATOCHEZIA: 0
FEVER: 0
NAUSEA: 0
DYSURIA: 0
NERVOUS/ANXIOUS: 0
ARTHRALGIAS: 0
HEADACHES: 0
CHILLS: 0
SORE THROAT: 0
WEAKNESS: 0
DIZZINESS: 0
CONSTIPATION: 0
DIARRHEA: 0
EYE PAIN: 0

## 2022-12-23 ASSESSMENT — ACTIVITIES OF DAILY LIVING (ADL): CURRENT_FUNCTION: NO ASSISTANCE NEEDED

## 2022-12-23 NOTE — PROGRESS NOTES
"SUBJECTIVE:   Heather is a 72 year old who presents for Preventive Visit.    Patient has been advised of split billing requirements and indicates understanding: Yes  Are you in the first 12 months of your Medicare coverage?  No    Healthy Habits:     In general, how would you rate your overall health?  Good    Frequency of exercise:  2-3 days/week    Duration of exercise:  15-30 minutes    Do you usually eat at least 4 servings of fruit and vegetables a day, include whole grains    & fiber and avoid regularly eating high fat or \"junk\" foods?  Yes    Taking medications regularly:  Yes    Medication side effects:  Not applicable    Ability to successfully perform activities of daily living:  No assistance needed    Home Safety:  No safety concerns identified    Hearing Impairment:  No hearing concerns    In the past 6 months, have you been bothered by leaking of urine?  No    In general, how would you rate your overall mental or emotional health?  Excellent      PHQ-2 Total Score: 0    Additional concerns today:  Yes      Have you ever done Advance Care Planning? (For example, a Health Directive, POLST, or a discussion with a medical provider or your loved ones about your wishes): No, advance care planning information given to patient to review.  Patient plans to discuss their wishes with loved ones or provider.      Fall risk  Fallen 2 or more times in the past year?: No  Any fall with injury in the past year?: No    Cognitive Screening   1) Repeat 3 items (Leader, Season, Table)    2) Clock draw: NORMAL  3) 3 item recall: Recalls 3 objects  Results: 3 items recalled: COGNITIVE IMPAIRMENT LESS LIKELY    Mini-CogTM Copyright [employer]. Licensed by the author for use in [facility]; reprinted with permission (temo@.[facility]). All rights reserved.      Do you have sleep apnea, excessive snoring or daytime drowsiness?: yes    Reviewed and updated as needed this visit by clinical staff    Allergies  Meds       "        Reviewed and updated as needed this visit by Provider                 Social History     Tobacco Use     Smoking status: Former     Packs/day: 0.00     Years: 0.00     Pack years: 0.00     Types: Cigarettes     Quit date: 1975     Years since quittin.0     Smokeless tobacco: Never   Substance Use Topics     Alcohol use: No     If you drink alcohol do you typically have >3 drinks per day or >7 drinks per week? No    Alcohol Use 2022   Prescreen: >3 drinks/day or >7 drinks/week? Not Applicable   No flowsheet data found.    Doing well overall.  Was having some reflux but stopped drinking soda and this has mostly abated.  Was taking Nexium but is not needing it anymore.    Had some dental work on the left side of her mouth and had ear pain for a few weeks after but that is feeling better.    Current providers sharing in care for this patient include:   Patient Care Team:  Carrie Delgado MD as PCP - Caryn Oleary MD as Assigned PCP    The following health maintenance items are reviewed in Epic and correct as of today:  Health Maintenance   Topic Date Due     DEXA  Never done     DTAP/TDAP/TD IMMUNIZATION (3 - Td or Tdap) 2020     BMP  2022     ALT  2023     LIPID  2023     ANNUAL REVIEW OF HM ORDERS  2023     MEDICARE ANNUAL WELLNESS VISIT  2023     FALL RISK ASSESSMENT  2023     CBC  2023     MAMMO SCREENING  2024     HF ACTION PLAN  2024     COLORECTAL CANCER SCREENING  2025     ADVANCE CARE PLANNING  2027     TSH W/FREE T4 REFLEX  Completed     PHQ-2 (once per calendar year)  Completed     INFLUENZA VACCINE  Completed     Pneumococcal Vaccine: 65+ Years  Completed     ZOSTER IMMUNIZATION  Completed     COVID-19 Vaccine  Completed     IPV IMMUNIZATION  Aged Out     MENINGITIS IMMUNIZATION  Aged Out     HEPATITIS C SCREENING  Discontinued     Lab work is in process  Mammogram Screening: Mammogram Screening:  "Recommended mammography every 1-2 years with patient discussion and risk factor consideration  TDAP due - will get at pharmacy        Review of Systems   Constitutional: Negative for chills and fever.   HENT: Negative for congestion, ear pain, hearing loss and sore throat.    Eyes: Negative for pain and visual disturbance.   Respiratory: Negative for cough and shortness of breath.    Cardiovascular: Negative for chest pain, palpitations and peripheral edema.   Gastrointestinal: Negative for abdominal pain, constipation, diarrhea, heartburn, hematochezia and nausea.   Breasts:  Negative for tenderness, breast mass and discharge.   Genitourinary: Negative for dysuria, frequency, genital sores, hematuria, pelvic pain, urgency, vaginal bleeding and vaginal discharge.   Musculoskeletal: Negative for arthralgias, joint swelling and myalgias.   Skin: Negative for rash.   Neurological: Negative for dizziness, weakness, headaches and paresthesias.   Psychiatric/Behavioral: Negative for mood changes. The patient is not nervous/anxious.          OBJECTIVE:   /70   Pulse 74   Temp 97.3  F (36.3  C) (Tympanic)   Resp 16   Ht 1.6 m (5' 3\")   Wt 95.8 kg (211 lb 2 oz)   SpO2 97%   BMI 37.40 kg/m   Estimated body mass index is 37.4 kg/m  as calculated from the following:    Height as of this encounter: 1.6 m (5' 3\").    Weight as of this encounter: 95.8 kg (211 lb 2 oz).  Physical Exam    Physical Exam:  General Appearance: Alert, cooperative, no distress, appears stated age   Head: Normocephalic, without obvious abnormality, atraumatic  Eyes: PERRL, conjunctiva/corneas clear, EOM's intact   Ears: Normal TM's and external ear canals, both ears  Neck: Supple, symmetrical, trachea midline, no adenopathy;  thyroid: not enlarged, symmetric, no tenderness/mass/nodules  Back: Symmetric, no curvature, ROM normal,  Lungs: Clear to auscultation bilaterally, respirations unlabored  Breasts: No breast masses, tenderness, " "asymmetry, or nipple discharge.  Heart: Regular rate and rhythm, S1 and S2 normal, no murmur, rub, or gallop  Abdomen: Soft, non-tender, bowel sounds active all four quadrants,  no masses, no organomegaly  Extremities: Extremities normal, atraumatic, no cyanosis or edema  Skin: Skin color, texture, turgor normal, no rashes or lesions  Lymph nodes: Cervical, supraclavicular, and axillary nodes normal and   Neurologic: Normal        ASSESSMENT / PLAN:   1. Encounter for Medicare annual wellness exam  - CBC with Platelets; Future  - BASIC METABOLIC PANEL; Future  - CBC with Platelets  - BASIC METABOLIC PANEL    2. Vitamin D deficiency  - Vitamin D Deficiency; Future  - Vitamin D Deficiency    3. Morbid obesity (H)  BMI of over 35 with congestive heart failure.  Discussed diet and exercise.  Patient has been being mindful of diet and has lost about 8 pounds in the last year.    4. Congestive heart failure, unspecified HF chronicity, unspecified heart failure type (H)  - CBC with Platelets; Future  - BASIC METABOLIC PANEL; Future  - CBC with Platelets  - BASIC METABOLIC PANEL    5. Screening for hyperlipidemia  - Lipid panel reflex to direct LDL Fasting; Future  - Lipid panel reflex to direct LDL Fasting    6. Post-menopausal  - DEXA HIP/PELVIS/SPINE - Future; Future      Patient has been advised of split billing requirements and indicates understanding: Yes      COUNSELING:  Reviewed preventive health counseling, as reflected in patient instructions      BMI:   Estimated body mass index is 37.4 kg/m  as calculated from the following:    Height as of this encounter: 1.6 m (5' 3\").    Weight as of this encounter: 95.8 kg (211 lb 2 oz).   Weight management plan: Discussed healthy diet and exercise guidelines      She reports that she quit smoking about 48 years ago. She has never used smokeless tobacco.      Appropriate preventive services were discussed with this patient, including applicable screening as appropriate for " "cardiovascular disease, diabetes, osteopenia/osteoporosis, and glaucoma.  As appropriate for age/gender, discussed screening for colorectal cancer, prostate cancer, breast cancer, and cervical cancer. Checklist reviewing preventive services available has been given to the patient.    Reviewed patients plan of care and provided an AVS. The Basic Care Plan (routine screening as documented in Health Maintenance) for Heather meets the Care Plan requirement. This Care Plan has been established and reviewed with the Patient.          Carrie Delgado MD  [facility]    Identified Health Risks:Answers for HPI/ROS submitted by the patient on 12/21/2022  In general, how would you rate your overall physical health?: good  Frequency of exercise:: 2-3 days/week  Do you usually eat at least 4 servings of fruit and vegetables a day, include whole grains & fiber, and avoid regularly eating high fat or \"junk\" foods? : Yes  Taking medications regularly:: Yes  Medication side effects:: Not applicable  Activities of Daily Living: no assistance needed  Home safety: no safety concerns identified  Hearing Impairment:: no hearing concerns  In the past 6 months, have you been bothered by leaking of urine?: No  abdominal pain: No  Blood in stool: No  Blood in urine: No  chest pain: No  chills: No  congestion: No  constipation: No  cough: No  diarrhea: No  dizziness: No  ear pain: No  eye pain: No  nervous/anxious: No  fever: No  frequency: No  genital sores: No  headaches: No  hearing loss: No  heartburn: No  arthralgias: No  joint swelling: No  peripheral edema: No  mood changes: No  myalgias: No  nausea: No  dysuria: No  palpitations: No  Skin sensation changes: No  sore throat: No  urgency: No  rash: No  shortness of breath: No  visual disturbance: No  weakness: No  pelvic pain: No  vaginal bleeding: No  vaginal discharge: No  tenderness: No  breast mass: No  breast discharge: No  In general, how would you rate " your overall mental or emotional health?: excellent  Additional concerns today:: Yes  Duration of exercise:: 15-30 minutes

## 2022-12-23 NOTE — PATIENT INSTRUCTIONS
Patient Education   Personalized Prevention Plan  You are due for the preventive services outlined below.  Your care team is available to assist you in scheduling these services.  If you have already completed any of these items, please share that information with your care team to update in your medical record.  Health Maintenance Due   Topic Date Due     Osteoporosis Screening  Never done     Discuss Advance Care Planning  Never done     Hepatitis C Screening  Never done     Diptheria Tetanus Pertussis (DTAP/TDAP/TD) Vaccine (3 - Td or Tdap) 08/23/2020     Complete Blood Count  09/29/2022     Basic Metabolic Panel  12/29/2022

## 2022-12-24 LAB — DEPRECATED CALCIDIOL+CALCIFEROL SERPL-MC: 61 UG/L (ref 20–75)

## 2023-02-07 ENCOUNTER — HOSPITAL ENCOUNTER (OUTPATIENT)
Dept: BONE DENSITY | Facility: CLINIC | Age: 73
Discharge: HOME | End: 2023-02-07
Attending: FAMILY MEDICINE | Admitting: FAMILY MEDICINE
Payer: COMMERCIAL

## 2023-02-07 DIAGNOSIS — Z78.0 POST-MENOPAUSAL: ICD-10-CM

## 2023-02-07 PROCEDURE — 77080 DXA BONE DENSITY AXIAL: CPT

## 2023-05-25 DIAGNOSIS — I42.9 CARDIOMYOPATHY, UNSPECIFIED TYPE (H): ICD-10-CM

## 2023-05-26 RX ORDER — CARVEDILOL 3.12 MG/1
TABLET ORAL
Qty: 90 TABLET | Refills: 1 | Status: SHIPPED | OUTPATIENT
Start: 2023-05-26 | End: 2023-11-24

## 2023-05-26 NOTE — TELEPHONE ENCOUNTER
"Last Written Prescription Date: 11/25/2022  Last Fill Quantity: 90, # refills: 1  Last office visit provider: 12/23/2022  Prescription approved per [facility] Refill Protocol.        Requested Prescriptions   Pending Prescriptions Disp Refills     carvedilol (COREG) 3.125 MG tablet [Pharmacy Med Name: CARVEDILOL 3.125 MG TABLET] 90 tablet 1     Sig: TAKE 1 TABLET BY MOUTH EVERY DAY       Beta-Blockers Protocol Passed - 5/25/2023  1:43 AM        Passed - Blood pressure under 140/90 in past 12 months     BP Readings from Last 3 Encounters:   12/23/22 112/70   06/29/22 112/72   09/29/21 118/76                 Passed - Patient is age 6 or older        Passed - Recent (12 mo) or future (30 days) visit within the authorizing provider's specialty     Patient has had an office visit with the authorizing provider or a provider within the authorizing providers department within the previous 12 mos or has a future within next 30 days. See \"Patient Info\" tab in inbasket, or \"Choose Columns\" in Meds & Orders section of the refill encounter.              Passed - Medication is active on med list                 Yaritza Pearson RN 05/26/23 10:23 AM  "

## 2023-07-07 PROBLEM — M79.2 NERVE PAIN: Status: ACTIVE | Noted: 2022-11-30

## 2023-07-07 PROBLEM — Z79.890 NEED FOR PROPHYLACTIC HORMONE REPLACEMENT THERAPY (POSTMENOPAUSAL): Status: RESOLVED | Noted: 2022-06-28 | Resolved: 2023-07-07

## 2023-07-07 RX ORDER — FAMOTIDINE 20 MG/1
TABLET, FILM COATED ORAL
COMMUNITY
Start: 2023-06-11 | End: 2024-02-07

## 2023-07-10 ENCOUNTER — OFFICE VISIT (OUTPATIENT)
Dept: FAMILY MEDICINE | Facility: CLINIC | Age: 73
End: 2023-07-10
Payer: COMMERCIAL

## 2023-07-10 VITALS
BODY MASS INDEX: 36.72 KG/M2 | DIASTOLIC BLOOD PRESSURE: 72 MMHG | SYSTOLIC BLOOD PRESSURE: 110 MMHG | OXYGEN SATURATION: 96 % | HEART RATE: 80 BPM | TEMPERATURE: 97.6 F | RESPIRATION RATE: 16 BRPM | HEIGHT: 63 IN | WEIGHT: 207.25 LBS

## 2023-07-10 DIAGNOSIS — E66.01 MORBID OBESITY (H): ICD-10-CM

## 2023-07-10 DIAGNOSIS — K21.9 GASTROESOPHAGEAL REFLUX DISEASE, UNSPECIFIED WHETHER ESOPHAGITIS PRESENT: Primary | ICD-10-CM

## 2023-07-10 DIAGNOSIS — I50.9 CONGESTIVE HEART FAILURE, UNSPECIFIED HF CHRONICITY, UNSPECIFIED HEART FAILURE TYPE (H): ICD-10-CM

## 2023-07-10 PROCEDURE — 99214 OFFICE O/P EST MOD 30 MIN: CPT | Performed by: FAMILY MEDICINE

## 2023-07-10 NOTE — PROGRESS NOTES
"Assessment/Plan:    Heather Blakely is a 73 year old female presenting for:    Gastroesophageal reflux disease, unspecified whether esophagitis present  Discussed with the patient that I agree this sounds to be fairly typical for acid reflux.  She is already made some dietary modifications.  We will start her on omeprazole 40 mg daily for 2 weeks and then 20 mg daily for 1 month.  After that she can wean back to the Pepcid.  She will let me know how she is doing in a few weeks.    If this is not beneficial would recommend an endoscopy for visualization and to check for H. pylori.  - omeprazole (PRILOSEC) 20 MG DR capsule  Dispense: 58 capsule; Refill: 0    Morbid obesity (H)  Discussed weight loss a bit today.  She is trying to eat healthfully.  May try \"Noom\" to see if this is helpful as well.  Offered referral to one of our weight  which she declines at this time.    Congestive heart failure, unspecified HF chronicity, unspecified heart failure type (H)  Continue to follow-up with cardiology.      Medications Discontinued During This Encounter   Medication Reason     esomeprazole (NEXIUM) 20 MG DR capsule            Chief Complaint:  Gastrophageal Reflux        Subjective:   Heather Blakely is a very pleasant 73-year-old female who presents to the clinic today with concerns over acid reflux.    Patient has a past medical history significant for intermittent acid reflux.  She states that this has not been very severe until about 3 months ago.  She noted that slowly she started to get more acid reflux at night which was fairly uncomfortable.  She notes that she is having an acid taste in the back of her throat multiple times at night and needs to swallow it back down.    Sometimes she will spit up some acid the music mucousy discharge.    She does not have any epigastric abdominal pain per her report.  No big changes in diet.  No changes in medication.    12 point review of systems completed " "and negative except for what has been described above    History   Smoking Status     Former     Packs/day: 0.00     Years: 0.00     Quit date: 1975   Smokeless Tobacco     Never         Current Outpatient Medications:      amoxicillin (AMOXIL) 500 MG capsule, TAKE 4 CAPSULES BY MOUTH 1 HOUR BEFORE DENTAL APPOINTMENT, Disp: , Rfl:      carvedilol (COREG) 3.125 MG tablet, TAKE 1 TABLET BY MOUTH EVERY DAY, Disp: 90 tablet, Rfl: 1     Cholecalciferol (VITAMIN D3 PO), Take 2,000 Units by mouth daily, Disp: , Rfl:      famotidine (PEPCID) 20 MG tablet, , Disp: , Rfl:      ibuprofen (ADVIL/MOTRIN) 800 MG tablet, TAKE 1 TABLET BY MOUTH THREE TIMES A DAY AS NEEDED FOR PAIN, Disp: , Rfl:      lisinopril (ZESTRIL) 2.5 MG tablet, Take 2.5 mg by mouth daily, Disp: , Rfl:      magnesium 250 MG tablet, Take 1 tablet by mouth daily, Disp: , Rfl:      omeprazole (PRILOSEC) 20 MG DR capsule, Take 2 capsules (40 mg) by mouth daily for 14 days, THEN 1 capsule (20 mg) daily for 30 days., Disp: 58 capsule, Rfl: 0     Probiotic Product (PROBIOTIC DAILY PO), , Disp: , Rfl:      triamcinolone (KENALOG) 0.1 % external cream, Apply topically 2 times daily, Disp: 45 g, Rfl: 1      Objective:  Vitals:    07/10/23 0839   BP: 110/72   Pulse: 80   Resp: 16   Temp: 97.6  F (36.4  C)   TempSrc: Tympanic   SpO2: 96%   Weight: 94 kg (207 lb 4 oz)   Height: 1.6 m (5' 3\")       Body mass index is 36.71 kg/m .    Vital signs reviewed and stable  General: No acute distress  Psych: Appropriate affect  HEENT: moist mucous membranes, pupils equal, round, reactive to light and accomodation, tympanic membranes are pearly grey bilaterally  Lymph: no cervical or supraclavicular lymphadenopathy  Cardiovascular: regular rate and rhythm with no murmur  Pulmonary: clear to auscultation bilaterally with no wheeze  Abdomen: soft, non tender, non distended with normo-active bowel sounds  Extremities: warm and well perfused with no edema  Skin: warm and dry with no " rash         This note has been dictated and transcribed using voice recognition software.   Any errors in transcription are unintentional and inherent to the software.  Answers for HPI/ROS submitted by the patient on 7/3/2023  What is the reason for your visit today? : Acid reful  How many servings of fruits and vegetables do you eat daily?: 2-3  On average, how many sweetened beverages do you drink each day (Examples: soda, juice, sweet tea, etc.  Do NOT count diet or artificially sweetened beverages)?: 0  How many minutes a day do you exercise enough to make your heart beat faster?: 10 to 19  How many days a week do you exercise enough to make your heart beat faster?: 5  How many days per week do you miss taking your medication?: 0

## 2023-07-19 DIAGNOSIS — B37.31 YEAST VAGINITIS: ICD-10-CM

## 2023-07-19 RX ORDER — FLUCONAZOLE 150 MG/1
150 TABLET ORAL ONCE
Qty: 5 TABLET | Refills: 0 | Status: SHIPPED | OUTPATIENT
Start: 2023-07-19 | End: 2023-07-19

## 2023-07-19 NOTE — TELEPHONE ENCOUNTER
"Routing refill request to provider for review/approval because:  Drug not on the [facility] refill protocol       Requested Prescriptions   Pending Prescriptions Disp Refills    fluconazole (DIFLUCAN) 150 MG tablet [Pharmacy Med Name: FLUCONAZOLE 150 MG TABLET] 5 tablet 0     Sig: TAKE 1 TABLET (150 MG) BY MOUTH ONCE FOR 1 DOSE       Antifungal Agents Failed - 7/19/2023 12:21 PM        Failed - Not Fluconazole or Terconazole      If oral Fluconazole or Terconazole, may refill if indicated in progress notes.           Failed - Medication is active on med list        Passed - Recent (12 mo) or future (30 days) visit within the authorizing provider's specialty     Patient has had an office visit with the authorizing provider or a provider within the authorizing providers department within the previous 12 mos or has a future within next 30 days. See \"Patient Info\" tab in inbasket, or \"Choose Columns\" in Meds & Orders section of the refill encounter.                       Eduardo Capone RN 07/19/23 12:44 PM   "

## 2023-07-24 ENCOUNTER — HOSPITAL ENCOUNTER (OUTPATIENT)
Dept: MAMMOGRAPHY | Facility: CLINIC | Age: 73
Discharge: HOME | End: 2023-07-24
Attending: FAMILY MEDICINE | Admitting: FAMILY MEDICINE
Payer: COMMERCIAL

## 2023-07-24 DIAGNOSIS — Z12.31 VISIT FOR SCREENING MAMMOGRAM: ICD-10-CM

## 2023-07-24 PROCEDURE — 77067 SCR MAMMO BI INCL CAD: CPT

## 2023-09-07 ENCOUNTER — MYC MEDICAL ADVICE (OUTPATIENT)
Dept: GASTROENTEROLOGY | Facility: CLINIC | Age: 73
End: 2023-09-07
Payer: COMMERCIAL

## 2023-09-08 NOTE — TELEPHONE ENCOUNTER
Called to remind patient of their upcoming appointment with our GI clinic, on Friday, September 15th at 9:00am with Sanjuana Baird. This appointment is scheduled as an in-person appt. Please arrive 15 minutes early to check in for your appointment. , if your appointment is virtual (video or telephone) you need to be in [place] for the visit. To reschedule or cancel patient to call [phone].    Lucero Garcia

## 2023-09-13 NOTE — TELEPHONE ENCOUNTER
REFERRAL INFORMATION:  Referring Provider: Dr. Carrie Delgado MD  Referring Clinic: [facility]  Reason for Visit/Diagnosis: GERD, unspecified whether esophagitis present     FUTURE VISIT INFORMATION:  Appointment Date: 9/15/23  Appointment Time: 9 AM     NOTES STATUS DETAILS   OFFICE NOTE from Referring Provider Internal [facility]:  8/24/23- MyC Medical Advice  7/10/23- OV with Carrie Delgado MD    [facility]:  6/20/17- OV with Carrie Delgado MD   OFFICE NOTE from Other Specialist N/A    HOSPITAL DISCHARGE SUMMARY/  ED VISITS N/A    OPERATIVE REPORT N/A    MEDICATION LIST Internal         ENDOSCOPY  N/A    COLONOSCOPY N/A    ERCP N/A    EUS N/A    STOOL TESTING Internal; [facility] [place]:  8/16/22- Cologua    [facility]:  3/11/19- Cologua   PERTINENT LABS Internal    PATHOLOGY REPORTS (RELATED) N/A    IMAGING (CT, MRI, EGD, MRCP, Small Bowel Follow Through/SBT, MR/CT Enterography) N/A

## 2023-09-14 NOTE — PROGRESS NOTES
GASTROENTEROLOGY NEW PATIENT VIDEO VISIT    CC/REFERRING MD:    Carrie Delgado    REASON FOR CONSULTATION:   Carrie Delgado for   Chief Complaint   Patient presents with    Consult       HISTORY OF PRESENT ILLNESS:    Heather Blakely is a 73 year old female with a past medical history significant for cholecystectomy, who is being evaluated via a billable video visit for GERD, referred by Dr. Carrie Delgado.      Per chart review, she made some dietary modifications and started omeprazole 40 mg daily for 2 weeks followed by 20 mg daily for 1 month back in July of 2023. If the omeprazole did not help, it was recommended for her to undergo endoscopy and H. Pylori biopsy. She has a history of intermittent reflux that became severe about 3 months ago. She reports acid brash multiple times per month.    Today, she reports that she has had acid reflux for a very long time but it has gotten bad in the past 3 months. She wakes at night with acid brash and phlegm that she needs to spit out.  Symptoms are occurring on a daily basis at this point.  She has trouble falling back asleep after waking due to her symptoms. She reports acid brash throughout the day too. If she falls asleep upright in her recliner, she will still wake up with acid brash and phlegm. She does also feel some heartburn in the chest once in a while.  She has noticed an increase in belching.  She reports hoarseness of voice, noticing a change in her vocal cords.  Reports her throat gets really dry when she talks and she can lose her voice.  This has been pretty persistent for the past month to month and a half. She reports she has many cavities in her mouth and her dentist thinks it may be acid related. Symptoms are worse after a bigger meal, so has tried to eat smaller portions. She has an adjustable bed and raises the head of the bed about 8 inches. She has switched to decaf coffee and has about 10 ounces  per morning. She stopped carbonated beverages and avoids acidic foods.  She does not eat after 6 to 7 PM.    She also reports a discomfort in the mornings behind her belly button once in a while. This tends to occur if it has been a bad night of acid reflux. This tends to get better as she gets up and moves around.  About 4 months ago she was also feeling as if she had a carbonated beverage in her stomach.  States this felt like bubbles in her upper abdomen.  Around the time, her cardiologist recommended taking some fiber to help with her cholesterol.  The bubbly feeling in the upper abdomen has gone away since starting fiber.    She reports that she still has a good appetite.  She does feel that she may be getting jones more quickly.  She has been eating smaller portions to avoid acid reflux.  She has noticed that she has lost weight due to decreasing her portion sizes.  She is down about 12 pounds in the last year.  She denies dysphagia to solids or liquids, odynophagia.    In the past, she has tried Nexium over-the-counter which worked temporarily.  She is now taking omeprazole 40 mg daily and finds this to be somewhat helpful but does not completely resolve her symptoms.  She takes the omeprazole at least 30 minutes before eating in the morning.  She has not tried any other medications such as Tums, Pepcid or Gaviscon.  She does intermittent fasting.    Her bowel movements have been regular and daily.  She has 1 bowel movement daily that is soft and easy to pass, describes [place] type IV stools.  She has not noticed any change in her periumbilical pain with bowel movements.    Fam hx: sister has celiac disease. No GI malignancies, no IBD  NSAIDs: ibuprofen as needed, maybe twice per week if that, 400 mg. Has had hip and knee replacement   Alcohol use-- no     Has never had colonoscopy -- struggles with colon prep   Has done cologuard twice, most recently 8/16/22 negative    Abdominal surgeries: She has a  history of cholecystectomy and tubal ligation     Wt Readings from Last 10 Encounters:   07/10/23 94 kg (207 lb 4 oz)   12/23/22 95.8 kg (211 lb 2 oz)   06/29/22 97.5 kg (215 lb)   09/29/21 99.8 kg (220 lb)   08/31/21 99.3 kg (219 lb)   04/30/21 99.5 kg (219 lb 4 oz)   03/06/20 104.3 kg (230 lb)   11/11/19 109.3 kg (241 lb)   06/26/19 106.7 kg (235 lb 4 oz)   04/18/19 106 kg (233 lb 9.6 oz)        I have reviewed and updated the patient's Past Medical History, Social History, Family History and Medication List.    Exam:    General appearance:  Healthy appearing adult, in no acute distress  Eyes:  Sclera anicteric  Ears, nose, mouth and throat:  No obvious external lesions of ears and nose.  Hearing intact  Neck:  Symmetric, No obvious external lesions  Respiratory:  Normal respiration, no use of accessory muscles   MSK:  No visual upper extremity, neck or facial muscle atrophy  Psychiatric:  Oriented to person, place and time, Appropriate mood and affect.   Neurologic:  Peripheral muscle function and dexterity appear to be intact      PERTINENT STUDIES have been reviewed.    ASSESSMENT/PLAN:    Heather Blakely is a 73 year old female who presents for evaluation of worsening GERD    1. Gastroesophageal reflux disease, unspecified whether esophagitis present    Longstanding history of GERD previously managed by lifestyle modifications and she has tried Nexium years ago over-the-counter with some relief.  Over the last 3 months her GERD symptoms have significantly worsened.  Her symptoms mainly manifest as acid brash.  Her symptoms are worse in the night and she is waking up with phlegm and burning in the back of the throat.  This has disrupted her sleep.  She has adjusted her portion sizes and made several other lifestyle modifications.  She is down about 12 pounds over the past year which has been gradual.  She is currently taking 40 mg of omeprazole daily which has been partially effective and does not use any  other medications for reflux.  Of note, her dentist has mentioned that she has changes from acid in her mouth.  She is noticing hoarseness of voice over the last month.  She has never had an EGD.  She denies dysphagia, odynophagia.    --Recommend EGD with biopsies for H. pylori, evaluation for Diamond's, hiatal hernia, and due to family history of celiac disease and her sister have requested duodenal biopsies for celiac disease  --Stop acid blocking medications 1 to 2 weeks prior to upper endoscopy  -- Recommend increasing omeprazole to 40 mg twice daily, 30 to 60 minutes before breakfast and supper  -- Add Pepcid 20 mg daily at nighttime before bed  -- Okay to use tums or Gaviscon for breakthrough symptoms  -- Continue lifestyle modifications including dietary adjustments as, avoiding acidic foods, caffeine, chocolate  --Will likely plan to continue PPI long term, so should have annual monitoring with vitamin B12, folate, iron, vitamin D, zinc, magnesium, phosphorus and creatinine  -- Return in 3 months after EGD  -- She has had a negative Cologuard in 8/2022.  No family history of colon cancer.  She would prefer not undergo colonoscopy but we did discuss that this could be done along with her EGD.      - Adult GI  Referral - Consult Only  - Adult GI Clinic Follow-Up Order (Blank); Future       Video-Visit Details  Video Visit Time: 35 minutes  Start time 8:51 AM  End time 9:26 AM   Type of service:  Video Visit  Originating Location (pt. Location): Home    Distant Location (provider location):  On-site  Platform used for Video Visit: Abner Baird PA-C    RTC 3 months after EGD    Thank you for this consultation.  It was a pleasure to participate in the care of this patient; please contact us with any further questions. 61 minutes spent on the date of the encounter doing chart review, history and exam, documentation and further activities as noted above.      Sanjuana Baird PA-C  Division of  Gastroenterology, Hepatology and Nutrition  [facility]

## 2023-09-15 ENCOUNTER — PRE VISIT (OUTPATIENT)
Dept: GASTROENTEROLOGY | Facility: CLINIC | Age: 73
End: 2023-09-15

## 2023-09-15 ENCOUNTER — VIRTUAL VISIT (OUTPATIENT)
Dept: GASTROENTEROLOGY | Facility: CLINIC | Age: 73
End: 2023-09-15
Attending: FAMILY MEDICINE
Payer: COMMERCIAL

## 2023-09-15 DIAGNOSIS — K21.9 GASTROESOPHAGEAL REFLUX DISEASE, UNSPECIFIED WHETHER ESOPHAGITIS PRESENT: ICD-10-CM

## 2023-09-15 PROCEDURE — 99205 OFFICE O/P NEW HI 60 MIN: CPT | Mod: VID | Performed by: STUDENT IN AN ORGANIZED HEALTH CARE EDUCATION/TRAINING PROGRAM

## 2023-09-15 NOTE — LETTER
9/15/2023         RE: Heather Blakely  [address]  [place] [address]        Dear Colleague,    Thank you for referring your patient, Heather Blakely, to the [facility]STRO[facility] [place]. Please see a copy of my visit note below.          GASTROENTEROLOGY NEW PATIENT VIDEO VISIT    CC/REFERRING MD:    Carrie Delgado    REASON FOR CONSULTATION:   Carrie Delgado for   Chief Complaint   Patient presents with    Consult       HISTORY OF PRESENT ILLNESS:    Heather Blakely is a 73 year old female with a past medical history significant for cholecystectomy, who is being evaluated via a billable video visit for GERD, referred by Dr. Carrie Delgado.      Per chart review, she made some dietary modifications and started omeprazole 40 mg daily for 2 weeks followed by 20 mg daily for 1 month back in July of 2023. If the omeprazole did not help, it was recommended for her to undergo endoscopy and H. Pylori biopsy. She has a history of intermittent reflux that became severe about 3 months ago. She reports acid brash multiple times per month.    Today, she reports that she has had acid reflux for a very long time but it has gotten bad in the past 3 months. She wakes at night with acid brash and phlegm that she needs to spit out.  Symptoms are occurring on a daily basis at this point.  She has trouble falling back asleep after waking due to her symptoms. She reports acid brash throughout the day too. If she falls asleep upright in her recliner, she will still wake up with acid brash and phlegm. She does also feel some heartburn in the chest once in a while.  She has noticed an increase in belching.  She reports hoarseness of voice, noticing a change in her vocal cords.  Reports her throat gets really dry when she talks and she can lose her voice.  This has been pretty persistent for the past month to month and a half. She reports she has many  cavities in her mouth and her dentist thinks it may be acid related. Symptoms are worse after a bigger meal, so has tried to eat smaller portions. She has an adjustable bed and raises the head of the bed about 8 inches. She has switched to decaf coffee and has about 10 ounces per morning. She stopped carbonated beverages and avoids acidic foods.  She does not eat after 6 to 7 PM.    She also reports a discomfort in the mornings behind her belly button once in a while. This tends to occur if it has been a bad night of acid reflux. This tends to get better as she gets up and moves around.  About 4 months ago she was also feeling as if she had a carbonated beverage in her stomach.  States this felt like bubbles in her upper abdomen.  Around the time, her cardiologist recommended taking some fiber to help with her cholesterol.  The bubbly feeling in the upper abdomen has gone away since starting fiber.    She reports that she still has a good appetite.  She does feel that she may be getting jones more quickly.  She has been eating smaller portions to avoid acid reflux.  She has noticed that she has lost weight due to decreasing her portion sizes.  She is down about 12 pounds in the last year.  She denies dysphagia to solids or liquids, odynophagia.    In the past, she has tried Nexium over-the-counter which worked temporarily.  She is now taking omeprazole 40 mg daily and finds this to be somewhat helpful but does not completely resolve her symptoms.  She takes the omeprazole at least 30 minutes before eating in the morning.  She has not tried any other medications such as Tums, Pepcid or Gaviscon.  She does intermittent fasting.    Her bowel movements have been regular and daily.  She has 1 bowel movement daily that is soft and easy to pass, describes [place] type IV stools.  She has not noticed any change in her periumbilical pain with bowel movements.    Fam hx: sister has celiac disease. No GI malignancies, no  IBD  NSAIDs: ibuprofen as needed, maybe twice per week if that, 400 mg. Has had hip and knee replacement   Alcohol use-- no     Has never had colonoscopy -- struggles with colon prep   Has done cologuard twice, most recently 8/16/22 negative    Abdominal surgeries: She has a history of cholecystectomy and tubal ligation     Wt Readings from Last 10 Encounters:   07/10/23 94 kg (207 lb 4 oz)   12/23/22 95.8 kg (211 lb 2 oz)   06/29/22 97.5 kg (215 lb)   09/29/21 99.8 kg (220 lb)   08/31/21 99.3 kg (219 lb)   04/30/21 99.5 kg (219 lb 4 oz)   03/06/20 104.3 kg (230 lb)   11/11/19 109.3 kg (241 lb)   06/26/19 106.7 kg (235 lb 4 oz)   04/18/19 106 kg (233 lb 9.6 oz)        I have reviewed and updated the patient's Past Medical History, Social History, Family History and Medication List.    Exam:    General appearance:  Healthy appearing adult, in no acute distress  Eyes:  Sclera anicteric  Ears, nose, mouth and throat:  No obvious external lesions of ears and nose.  Hearing intact  Neck:  Symmetric, No obvious external lesions  Respiratory:  Normal respiration, no use of accessory muscles   MSK:  No visual upper extremity, neck or facial muscle atrophy  Psychiatric:  Oriented to person, place and time, Appropriate mood and affect.   Neurologic:  Peripheral muscle function and dexterity appear to be intact      PERTINENT STUDIES have been reviewed.    ASSESSMENT/PLAN:    Heather Blakely is a 73 year old female who presents for evaluation of worsening GERD    1. Gastroesophageal reflux disease, unspecified whether esophagitis present    Longstanding history of GERD previously managed by lifestyle modifications and she has tried Nexium years ago over-the-counter with some relief.  Over the last 3 months her GERD symptoms have significantly worsened.  Her symptoms mainly manifest as acid brash.  Her symptoms are worse in the night and she is waking up with phlegm and burning in the back of the throat.  This has disrupted  her sleep.  She has adjusted her portion sizes and made several other lifestyle modifications.  She is down about 12 pounds over the past year which has been gradual.  She is currently taking 40 mg of omeprazole daily which has been partially effective and does not use any other medications for reflux.  Of note, her dentist has mentioned that she has changes from acid in her mouth.  She is noticing hoarseness of voice over the last month.  She has never had an EGD.  She denies dysphagia, odynophagia.    --Recommend EGD with biopsies for H. pylori, evaluation for Diamond's, hiatal hernia, and due to family history of celiac disease and her sister have requested duodenal biopsies for celiac disease  --Stop acid blocking medications 1 to 2 weeks prior to upper endoscopy  -- Recommend increasing omeprazole to 40 mg twice daily, 30 to 60 minutes before breakfast and supper  -- Add Pepcid 20 mg daily at nighttime before bed  -- Okay to use tums or Gaviscon for breakthrough symptoms  -- Continue lifestyle modifications including dietary adjustments as, avoiding acidic foods, caffeine, chocolate  --Will likely plan to continue PPI long term, so should have annual monitoring with vitamin B12, folate, iron, vitamin D, zinc, magnesium, phosphorus and creatinine  -- Return in 3 months after EGD  -- She has had a negative Cologuard in 8/2022.  No family history of colon cancer.  She would prefer not undergo colonoscopy but we did discuss that this could be done along with her EGD.      - Adult GI  Referral - Consult Only  - Adult GI Clinic Follow-Up Order (Blank); Future       RTC 3 months after EGD    Thank you for this consultation.  It was a pleasure to participate in the care of this patient; please contact us with any further questions. 61 minutes spent on the date of the encounter doing chart review, history and exam, documentation and further activities as noted above.              Again, thank you for allowing  me to participate in the care of your patient.      Sincerely,    Sanjuana Baird PA-C

## 2023-09-15 NOTE — TELEPHONE ENCOUNTER
"Routing refill request to provider for review/approval because:  Provider to determine length of use        Requested Prescriptions   Pending Prescriptions Disp Refills    omeprazole (PRILOSEC) 20 MG DR capsule [Pharmacy Med Name: OMEPRAZOLE DR 20 MG CAPSULE] 58 capsule 0     Sig: Take 2 capsules (40 mg) by mouth daily for 14 days, THEN 1 capsule (20 mg) daily for 30 days.       PPI Protocol Passed - 9/15/2023  1:22 PM        Passed - Not on Clopidogrel (unless Pantoprazole ordered)        Passed - No diagnosis of osteoporosis on record        Passed - Recent (12 mo) or future (30 days) visit within the authorizing provider's specialty     Patient has had an office visit with the authorizing provider or a provider within the authorizing providers department within the previous 12 mos or has a future within next 30 days. See \"Patient Info\" tab in inbasket, or \"Choose Columns\" in Meds & Orders section of the refill encounter.              Passed - Medication is active on med list        Passed - Patient is age 18 or older        Passed - No active pregnacy on record        Passed - No positive pregnancy test in past 12 months                 Yaritza Pearson RN 09/15/23 2:47 PM    "

## 2023-09-15 NOTE — TELEPHONE ENCOUNTER
Can you please get an update from this patient?  We had discussed in clinic that she would only do the omeprazole for short amount of time and then wean back to Pepcid.  If she is not feeling better we had discussed doing an endoscopy.    Thanks    EB

## 2023-09-15 NOTE — PATIENT INSTRUCTIONS
It was a pleasure taking care of you today.  I've included a brief summary of our discussion and care plan from today's visit below.  Please review this information with your primary care provider.  _______________________________________________________________________     My recommendations are summarized as follows:  -- Increase omeprazole 40 mg twice daily, 30 to 60 minutes before breakfast and supper  -- Add Pepcid 20 mg daily at nighttime before bed  -- Okay to use tums or Gaviscon for breakthrough symptoms  -- Continue lifestyle modifications including dietary adjustments as, avoiding acidic foods, caffeine, chocolate  --Recommend EGD with biopsies for H. pylori, evaluation for Diamond's, hiatal hernia, and due to family history of celiac disease and her sister have requested duodenal biopsies for celiac disease  --Stop acid blocking medications 1 to 2 weeks prior to upper endoscopy  --Follow up in 3 months after EGD or sooner if needed   ______________________________________________________________________     How do I schedule labs, imaging studies, or procedures that were ordered in clinic today?      Labs: To schedule lab appointment you can contact your local [facility] or call [phone] to schedule at any convenient [facility] location.      Procedures: If a colonoscopy, upper endoscopy, breath test, esophageal manometry, or pH impedence was ordered today, our endoscopy team will call you to schedule this. If you have not heard from our endoscopy team within a week, please call [phone] to schedule.      Imaging Studies: If you were scheduled for a CT scan, X-ray, MRI, ultrasound, HIDA scan or other imaging study, please call [phone] to have this scheduled.      Referral: If a referral to another specialty was ordered, expect a phone call or follow instructions above. If you have not heard from anyone regarding your referral in a week, please call our clinic to check the  status.      Who do I call with any questions after my visit?  Please be in touch if there are any further questions that arise following today's visit.  There are multiple ways to contact your gastroenterology care team.       During business hours, you may reach a Gastroenterology nurse at [phone]     To schedule or reschedule an appointment, please call [phone].      You can always send a secure message through Evrent.  Evrent messages are answered by your nurse or doctor typically within 24 hours.  Please allow extra time on weekends and holidays.       For urgent/emergent questions after business hours, you may reach the on-call GI Fellow by contacting the [facility]  at [phone].     How will I get the results of any tests ordered?    You will receive all of your results.  If you have signed up for [employer], any tests ordered at your visit will be available to you after your physician reviews them.  Typically this takes 1-2 weeks.  If there are urgent results that require a change in your care plan, your physician or nurse will call you to discuss the next steps.       What is Evrent?  Evrent is a secure way for you to access all of your healthcare records from the [facility].  It is a web based computer program, so you can sign on to it from any location.  It also allows you to send secure messages to your care team.  I recommend signing up for Evrent access if you have not already done so and are comfortable with using a computer.       How to I schedule a follow-up visit?  If you did not schedule a follow-up visit today, please call [phone] to schedule a follow-up office visit.

## 2023-09-18 ENCOUNTER — TELEPHONE (OUTPATIENT)
Dept: GASTROENTEROLOGY | Facility: CLINIC | Age: 73
End: 2023-09-18
Payer: COMMERCIAL

## 2023-09-18 NOTE — TELEPHONE ENCOUNTER
"Routing refill request to provider for review/approval because:  Provider to determine length of use        Requested Prescriptions   Pending Prescriptions Disp Refills    omeprazole (PRILOSEC) 20 MG DR capsule [Pharmacy Med Name: OMEPRAZOLE DR 20 MG CAPSULE] 58 capsule 0     Sig: Take 2 capsules (40 mg) by mouth daily for 14 days, THEN 1 capsule (20 mg) daily for 30 days.       PPI Protocol Passed - 9/15/2023  2:53 PM        Passed - Not on Clopidogrel (unless Pantoprazole ordered)        Passed - No diagnosis of osteoporosis on record        Passed - Recent (12 mo) or future (30 days) visit within the authorizing provider's specialty     Patient has had an office visit with the authorizing provider or a provider within the authorizing providers department within the previous 12 mos or has a future within next 30 days. See \"Patient Info\" tab in inbasket, or \"Choose Columns\" in Meds & Orders section of the refill encounter.              Passed - Medication is active on med list        Passed - Patient is age 18 or older        Passed - No active pregnacy on record        Passed - No positive pregnancy test in past 12 months                 Yaritza Pearson RN 09/18/23 10:51 AM   "

## 2023-09-18 NOTE — TELEPHONE ENCOUNTER
"Endoscopy Scheduling Screen    Have you had a positive Covid test in the last 14 days?  No    Are you active on MyChart?   Yes    What insurance is in the chart?  Other:  BC[employer]    Ordering/Referring Provider:   XIOMY SCHWARTZ        (If ordering provider performs procedure, schedule with ordering provider unless otherwise instructed. )    BMI: Estimated body mass index is 36.71 kg/m  as calculated from the following:    Height as of 7/10/23: 1.6 m (5' 3\").    Weight as of 7/10/23: 94 kg (207 lb 4 oz).     Sedation Ordered  MAC/deep sedation.   BMI<= 45 45 < BMI <= 48 48 < BMI < = 50  BMI > 50   No Restrictions No [facility]  No [facility]  [facility] with exceptions Hospital Only OR Only       Are you taking any prescription medications for pain 3 or more times per week?   No    Do you have a history of malignant hyperthermia or adverse reaction to anesthesia?  No    (Females) Are you currently pregnant?   No     Have you been diagnosed or told you have pulmonary hypertension?   No    Do you have an LVAD?  No    Have you been told you have moderate to severe sleep apnea?  No    Have you been told you have COPD, asthma, or any other lung disease?  No    Do you have any heart conditions?  Yes     In the past 6 months, have you had any hospitalizations for heart related issues including cardiomyopathy, heart attack, or stent placement?  No    Do you have any implantable devices in your body (pacemaker, ICD)?  No    Do you take nitroglycerine?  No    Have you ever had an organ transplant?   No    Have you ever had or are you awaiting a heart or lung transplant?   No    Have you had a stroke or transient ischemic attack (TIA aka \"mini stroke\" in the last 6 months?   No    Have you been diagnosed with or been told you have cirrhosis of the liver?   No    Are you currently on dialysis?   No    Do you need assistance transferring?   No    BMI: Estimated body mass index is 36.71 kg/m  as calculated from the following:    Height as of " "7/10/23: 1.6 m (5' 3\").    Weight as of 7/10/23: 94 kg (207 lb 4 oz).     Is patients BMI > 40 and scheduling location [facility]?  No    Do you take an injectable medication for weight loss or diabetes (excluding insulin)?  No    Do you take the medication Naltrexone?  No    Do you take blood thinners?  No       Prep   Are you currently on dialysis or do you have chronic kidney disease?  No    Do you have a diagnosis of diabetes?  No    Do you have a diagnosis of cystic fibrosis (CF)?  No    On a regular basis do you go 3 -5 days between bowel movements?  No    BMI > 40?  No    Preferred Pharmacy:    [id number] - [place], [place] - [address] [facility]  [address] [address]  Phone: [phone] Fax: [phone]      Final Scheduling Details   Colonoscopy prep sent?  N/A    Procedure scheduled  Upper endoscopy (EGD)    Surgeon:  Handy     Date of procedure:  11/10     Pre-OP / PAC:   No - Not required for this site.    Location  [facility] - ASC - Patient preference.    Sedation   MAC/Deep Sedation - Per order.      Patient Reminders:   You will receive a call from a Nurse to review instructions and health history.  This assessment must be completed prior to your procedure.  Failure to complete the Nurse assessment may result in the procedure being cancelled.      On the day of your procedure, please designate an adult(s) who can drive you home stay with you for the next 24 hours. The medicines used in the exam will make you sleepy. You will not be able to drive.      You cannot take public transportation, ride share services, or non-medical taxi service without a responsible caregiver.  Medical transport services are allowed with the requirement that a responsible caregiver will receive you at your destination.  We require that drivers and caregivers are confirmed prior to your procedure.  "

## 2023-09-18 NOTE — TELEPHONE ENCOUNTER
Caller: Heather  Reason for Reschedule/Cancellation (please be detailed, any staff messages or encounters to note?): Patient cannot do the day it was scheduled      Prior to reschedule please review:n  Ordering Provider: Brie  Sedation per order: MAC  Does patient have any ASC Exclusions, please identify?: N      Notes on Cancelled Procedure:  Procedure: Upper Endoscopy [EGD]   Date: 11/10/23  Location: [facility]; [address] [place] [address], [address] [place] [address]  Surgeon: Handy      Rescheduled: Yes  Procedure: Upper Endoscopy [EGD]   Date: 9/22/23  Location: [facility]; [address] [place] [address], [address] [place] [address]  Surgeon: STACEY Sorto  Sedation Level Scheduled  MAC ,  Reason for Sedation Level per order  Prep/Instructions updated and sent: Yes       Send In - basket message to Panc - Neel Pool if EUS  procedure is canceled or rescheduled: [ N/A, YES or NO] NA

## 2023-09-18 NOTE — TELEPHONE ENCOUNTER
Pre assessment completed for upcoming procedure.      Procedure details:    Patient scheduled for Upper endoscopy (EGD) on 9/22/23.     Arrival time: 0745. Procedure time 0845    Pre op exam needed? N/A    Facility location: [facility]; [address] [place] [address], [address], [place] [address]    Sedation type: MAC    Indication for procedure: longstanding GERD with worsening over past 3 months, nocturnal awakening with acid brash, hoarseness, dental enamel changes. Please obtain gastric biopsies for H pylori and duodenal biopsies for celiac disease. Eval for dong's and hiatal hernia     COVID policy reviewed.    Designated  policy reviewed. Instructed to have someone stay 24 hours post procedure.       Chart review:     Electronic implanted devices? No    Diabetic? No    Diabetic medication HOLDING recommendations: (if applicable)  Oral diabetic medications: No  Diabetic injectables: No  Insulin: No    Medication review:    Anticoagulants? No    NSAIDS? Yes.  Ibuprofen (Advil, Motrin).  Holding interval of 1 day before procedure.    Other medication HOLDING recommendations:  N/A      Prep for procedure:     Prep instructions sent via [facility]     Reviewed procedure prep instructions.     Patient verbalized understanding and had no questions or concerns at this time.        Amanda Sandoval RN  Endoscopy Procedure Pre Assessment RN  [phone] option 4

## 2023-09-19 NOTE — TELEPHONE ENCOUNTER
Patient returning call to Howard.   Copied and pasted from 09/15/23 VV with azalea Irvin PA:  - Recommend increasing omeprazole to 40 mg twice daily, 30 to 60 minutes before breakfast and supper  -- Add Pepcid 20 mg daily at nighttime before bed  Stop acid blocking medications 1 to 2 weeks prior to upper endoscopy   Rx was not sent in. Patient does have enough to take this way until her her endoscopy this Friday.   She also spoke with surgical nurse yesterday who states she did not need to stop the pepcid before her procedure.   Advised to talk to surgeon after her scope to advise on further medication management. Will need Rx to continue.  Patient verbalizes understanding.  Shanika SHEA RN

## 2023-09-19 NOTE — TELEPHONE ENCOUNTER
Patient has endoscopy scheduled.   RN tried to reach patient.   No answer.   LVM to call back to [phone].   Yaritza Pearson RN on 9/19/2023 at 11:45 AM

## 2023-09-20 ENCOUNTER — TELEPHONE (OUTPATIENT)
Dept: GASTROENTEROLOGY | Facility: CLINIC | Age: 73
End: 2023-09-20
Payer: COMMERCIAL

## 2023-09-20 NOTE — TELEPHONE ENCOUNTER
Spoke with patient and scheduled the 3 mo follow-up order. Patient is scheduled for a video visit with Sanjuana Baird on 12/21/23.

## 2023-09-21 DIAGNOSIS — K21.9 GASTROESOPHAGEAL REFLUX DISEASE, UNSPECIFIED WHETHER ESOPHAGITIS PRESENT: ICD-10-CM

## 2023-09-21 RX ORDER — OMEPRAZOLE 40 MG/1
40 CAPSULE, DELAYED RELEASE ORAL DAILY
Qty: 30 CAPSULE | Refills: 1 | OUTPATIENT
Start: 2023-09-21

## 2023-09-22 ENCOUNTER — HOSPITAL ENCOUNTER (OUTPATIENT)
Facility: AMBULATORY SURGERY CENTER | Age: 73
Discharge: HOME | End: 2023-09-22
Attending: INTERNAL MEDICINE | Admitting: INTERNAL MEDICINE
Payer: COMMERCIAL

## 2023-09-22 ENCOUNTER — ANESTHESIA (OUTPATIENT)
Dept: SURGERY | Facility: AMBULATORY SURGERY CENTER | Age: 73
End: 2023-09-22
Payer: COMMERCIAL

## 2023-09-22 ENCOUNTER — ANESTHESIA EVENT (OUTPATIENT)
Dept: SURGERY | Facility: AMBULATORY SURGERY CENTER | Age: 73
End: 2023-09-22
Payer: COMMERCIAL

## 2023-09-22 VITALS
OXYGEN SATURATION: 96 % | RESPIRATION RATE: 12 BRPM | BODY MASS INDEX: 36.68 KG/M2 | HEART RATE: 71 BPM | TEMPERATURE: 96.7 F | WEIGHT: 207 LBS | HEIGHT: 63 IN | DIASTOLIC BLOOD PRESSURE: 70 MMHG | SYSTOLIC BLOOD PRESSURE: 109 MMHG

## 2023-09-22 VITALS — HEART RATE: 77 BPM

## 2023-09-22 LAB — UPPER GI ENDOSCOPY: NORMAL

## 2023-09-22 PROCEDURE — 43239 EGD BIOPSY SINGLE/MULTIPLE: CPT | Performed by: INTERNAL MEDICINE

## 2023-09-22 PROCEDURE — 88305 TISSUE EXAM BY PATHOLOGIST: CPT | Mod: 26 | Performed by: PATHOLOGY

## 2023-09-22 PROCEDURE — 88305 TISSUE EXAM BY PATHOLOGIST: CPT | Mod: TC | Performed by: INTERNAL MEDICINE

## 2023-09-22 RX ORDER — SODIUM CHLORIDE, SODIUM LACTATE, POTASSIUM CHLORIDE, CALCIUM CHLORIDE 600; 310; 30; 20 MG/100ML; MG/100ML; MG/100ML; MG/100ML
500 INJECTION, SOLUTION INTRAVENOUS CONTINUOUS
Status: DISCONTINUED | OUTPATIENT
Start: 2023-09-22 | End: 2023-09-22 | Stop reason: HOSPADM

## 2023-09-22 RX ORDER — LIDOCAINE 40 MG/G
CREAM TOPICAL
Status: DISCONTINUED | OUTPATIENT
Start: 2023-09-22 | End: 2023-09-22 | Stop reason: HOSPADM

## 2023-09-22 RX ORDER — NALOXONE HYDROCHLORIDE 0.4 MG/ML
0.2 INJECTION, SOLUTION INTRAMUSCULAR; INTRAVENOUS; SUBCUTANEOUS
Status: DISCONTINUED | OUTPATIENT
Start: 2023-09-22 | End: 2023-09-23 | Stop reason: HOSPADM

## 2023-09-22 RX ORDER — LIDOCAINE HYDROCHLORIDE 20 MG/ML
INJECTION, SOLUTION INFILTRATION; PERINEURAL PRN
Status: DISCONTINUED | OUTPATIENT
Start: 2023-09-22 | End: 2023-09-22

## 2023-09-22 RX ORDER — ONDANSETRON 2 MG/ML
4 INJECTION INTRAMUSCULAR; INTRAVENOUS
Status: DISCONTINUED | OUTPATIENT
Start: 2023-09-22 | End: 2023-09-22 | Stop reason: HOSPADM

## 2023-09-22 RX ORDER — PROPOFOL 10 MG/ML
INJECTION, EMULSION INTRAVENOUS PRN
Status: DISCONTINUED | OUTPATIENT
Start: 2023-09-22 | End: 2023-09-22

## 2023-09-22 RX ORDER — FLUMAZENIL 0.1 MG/ML
0.2 INJECTION, SOLUTION INTRAVENOUS
Status: ACTIVE | OUTPATIENT
Start: 2023-09-22 | End: 2023-09-22

## 2023-09-22 RX ORDER — ONDANSETRON 4 MG/1
4 TABLET, ORALLY DISINTEGRATING ORAL EVERY 6 HOURS PRN
Status: DISCONTINUED | OUTPATIENT
Start: 2023-09-22 | End: 2023-09-23 | Stop reason: HOSPADM

## 2023-09-22 RX ORDER — NALOXONE HYDROCHLORIDE 0.4 MG/ML
0.4 INJECTION, SOLUTION INTRAMUSCULAR; INTRAVENOUS; SUBCUTANEOUS
Status: DISCONTINUED | OUTPATIENT
Start: 2023-09-22 | End: 2023-09-23 | Stop reason: HOSPADM

## 2023-09-22 RX ORDER — PROPOFOL 10 MG/ML
INJECTION, EMULSION INTRAVENOUS CONTINUOUS PRN
Status: DISCONTINUED | OUTPATIENT
Start: 2023-09-22 | End: 2023-09-22

## 2023-09-22 RX ORDER — ONDANSETRON 2 MG/ML
4 INJECTION INTRAMUSCULAR; INTRAVENOUS EVERY 6 HOURS PRN
Status: DISCONTINUED | OUTPATIENT
Start: 2023-09-22 | End: 2023-09-23 | Stop reason: HOSPADM

## 2023-09-22 RX ORDER — PROCHLORPERAZINE MALEATE 5 MG
5 TABLET ORAL EVERY 6 HOURS PRN
Status: DISCONTINUED | OUTPATIENT
Start: 2023-09-22 | End: 2023-09-23 | Stop reason: HOSPADM

## 2023-09-22 RX ADMIN — LIDOCAINE HYDROCHLORIDE 100 MG: 20 INJECTION, SOLUTION INFILTRATION; PERINEURAL at 08:52

## 2023-09-22 RX ADMIN — PROPOFOL 200 MCG/KG/MIN: 10 INJECTION, EMULSION INTRAVENOUS at 08:52

## 2023-09-22 RX ADMIN — PROPOFOL 100 MG: 10 INJECTION, EMULSION INTRAVENOUS at 08:52

## 2023-09-22 RX ADMIN — SODIUM CHLORIDE, SODIUM LACTATE, POTASSIUM CHLORIDE, CALCIUM CHLORIDE 500 ML: 600; 310; 30; 20 INJECTION, SOLUTION INTRAVENOUS at 08:31

## 2023-09-22 NOTE — H&P
"Heather MURGUIA Reddy  [id number]  female  73 year old      Reason for procedure/surgery: gerd    Patient Active Problem List   Diagnosis    Hyperlipidemia    Esophageal reflux    Congestive heart failure (H)    Morbid obesity (H)    Vitamin D deficiency    Tachycardia    Shortness of breath    Pain in joint    Other malaise and fatigue    Other chest pain    Osteoarthrosis involving lower leg    Lightheaded    Hypotension, unspecified    Allergic rhinitis    Nerve pain       Past Surgical History:    Past Surgical History:   Procedure Laterality Date    EYE SURGERY  Nov/Dec 2019 and May 2020    Cataracts and eye lift    HC REMOVAL GALLBLADDER      Description: Cholecystectomy;  Proc Date: 2000;    ORTHOPEDIC SURGERY  2007    knee replacement    MO ARTHROPLASTY TIBIAL PLATEAU      Description: Knee Replacement;  Recorded: 2013;  Comments: 1969 = \"reconstruction\" per pt    ZZC LIGATE FALLOPIAN TUBE      Description: Tubal Ligation;  Recorded: 2013;       Past Medical History:   Past Medical History:   Diagnosis Date    Arthritis     Blood glucose elevated     Cardiomyopathy (H)     Congestive heart failure (H)     Gastroesophageal reflux disease     Hyperlipidemia     LBBB (left bundle branch block)     Palpitations     PONV (postoperative nausea and vomiting)     Sleep apnea        Social History:   Social History     Tobacco Use    Smoking status: Former     Packs/day: 0.00     Years: 0.00     Pack years: 0.00     Types: Cigarettes     Quit date:      Years since quittin.7    Smokeless tobacco: Never   Substance Use Topics    Alcohol use: No       Family History:   Family History   Problem Relation Age of Onset    Sleep Apnea Child     Heart Disease Mother     Hypertension Mother     Diabetes Mother         Type 2    Diabetes Father         Type 1    Cerebrovascular Disease Maternal Grandmother     Diabetes Maternal Grandmother         Type 1    Breast Cancer No family hx of  " "      Allergies:   Allergies   Allergen Reactions    Sulfa Antibiotics Unknown     Reaction as a child       Active Medications:   Current Outpatient Medications   Medication Sig Dispense Refill    carvedilol (COREG) 3.125 MG tablet TAKE 1 TABLET BY MOUTH EVERY DAY 90 tablet 1    Cholecalciferol (VITAMIN D3 PO) Take 2,000 Units by mouth daily      famotidine (PEPCID) 20 MG tablet       ibuprofen (ADVIL/MOTRIN) 800 MG tablet TAKE 1 TABLET BY MOUTH THREE TIMES A DAY AS NEEDED FOR PAIN      lisinopril (ZESTRIL) 2.5 MG tablet Take 2.5 mg by mouth daily      magnesium 250 MG tablet Take 1 tablet by mouth daily      omeprazole (PRILOSEC) 40 MG DR capsule Take 1 capsule (40 mg) by mouth daily 30 capsule 1    Probiotic Product (PROBIOTIC DAILY PO)       amoxicillin (AMOXIL) 500 MG capsule TAKE 4 CAPSULES BY MOUTH 1 HOUR BEFORE DENTAL APPOINTMENT      triamcinolone (KENALOG) 0.1 % external cream Apply topically 2 times daily 45 g 1       Systemic Review:   CONSTITUTIONAL: NEGATIVE for fever, chills, change in weight  ENT/MOUTH: NEGATIVE for ear, mouth and throat problems  RESP: NEGATIVE for significant cough or SOB  CV: NEGATIVE for chest pain, palpitations or peripheral edema    Physical Examination:   Vital Signs: /83 (BP Location: Right arm)   Pulse 79   Temp 98.6  F (37  C) (Temporal)   Resp 18   Ht 1.6 m (5' 3\")   Wt 93.9 kg (207 lb)   SpO2 96%   BMI 36.67 kg/m    GENERAL: healthy, alert and no distress  NECK: no adenopathy, no asymmetry, masses, or scars  RESP: lungs clear to auscultation - no rales, rhonchi or wheezes  CV: regular rate and rhythm, normal S1 S2, no S3 or S4, no murmur, click or rub, no peripheral edema and peripheral pulses strong  ABDOMEN: soft, nontender, no hepatosplenomegaly, no masses and bowel sounds normal  MS: no gross musculoskeletal defects noted, no edema    ASA: 2    Mallampati Score: 2    Plan: Appropriate to proceed as scheduled.      David Sorto, " MD  9/22/2023    PCP:  Carrie Delgado

## 2023-09-22 NOTE — ANESTHESIA POSTPROCEDURE EVALUATION
Patient: Heather Blakely    Procedure: Procedure(s):  ESOPHAGOGASTRODUODENOSCOPY WITH BIOPSY       Anesthesia Type:  MAC    Note:  Disposition: Outpatient   Postop Pain Control: Uneventful            Sign Out: Well controlled pain   PONV: No   Neuro/Psych: Uneventful            Sign Out: Acceptable/Baseline neuro status   Airway/Respiratory: Uneventful            Sign Out: Acceptable/Baseline resp. status   CV/Hemodynamics: Uneventful            Sign Out: Acceptable CV status; No obvious hypovolemia; No obvious fluid overload   Other NRE: NONE   DID A NON-ROUTINE EVENT OCCUR? No           Last vitals:  Vitals Value Taken Time   /70 09/22/23 0930   Temp 35.9  C (96.7  F) 09/22/23 0930   Pulse 71 09/22/23 0930   Resp 12 09/22/23 0930   SpO2 96 % 09/22/23 0930       Electronically Signed By: SONIA CHOI MD  September 22, 2023  11:01 AM

## 2023-09-22 NOTE — ANESTHESIA PREPROCEDURE EVALUATION
"Anesthesia Pre-Procedure Evaluation    Patient: Heather Blakely   MRN: [id number] : 1950        Procedure : Procedure(s):  Esophagoscopy, gastroscopy, duodenoscopy (EGD), combined          Past Medical History:   Diagnosis Date    Arthritis     Blood glucose elevated     Cardiomyopathy (H)     Congestive heart failure (H)     Gastroesophageal reflux disease     Hyperlipidemia     LBBB (left bundle branch block)     Palpitations     PONV (postoperative nausea and vomiting)     Sleep apnea       Past Surgical History:   Procedure Laterality Date    EYE SURGERY  Nov/Dec 2019 and May 2020    Cataracts and eye lift    HC REMOVAL GALLBLADDER      Description: Cholecystectomy;  Proc Date: 2000;    ORTHOPEDIC SURGERY  2007    knee replacement    ME ARTHROPLASTY TIBIAL PLATEAU      Description: Knee Replacement;  Recorded: 2013;  Comments: 1969 = \"reconstruction\" per pt    ZZC LIGATE FALLOPIAN TUBE      Description: Tubal Ligation;  Recorded: 2013;      Allergies   Allergen Reactions    Sulfa Antibiotics Unknown     Reaction as a child      Social History     Tobacco Use    Smoking status: Former     Packs/day: 0.00     Years: 0.00     Pack years: 0.00     Types: Cigarettes     Quit date:      Years since quittin.7    Smokeless tobacco: Never   Substance Use Topics    Alcohol use: No      Wt Readings from Last 1 Encounters:   23 93.9 kg (207 lb)        Anesthesia Evaluation        History of anesthetic complications       ROS/MED HX  ENT/Pulmonary:     (+) sleep apnea,                                      Neurologic:       Cardiovascular:     (+)  - -   -  - -      CHF                                METS/Exercise Tolerance:     Hematologic:       Musculoskeletal:       GI/Hepatic:     (+) GERD,                   Renal/Genitourinary:       Endo:     (+)               Obesity,       Psychiatric/Substance Use:       Infectious Disease:       Malignancy:       Other:      "       Physical Exam    Airway        Mallampati: II       Respiratory Devices and Support         Dental           Cardiovascular          Rhythm and rate: regular and normal     Pulmonary                   OUTSIDE LABS:  CBC:   Lab Results   Component Value Date    WBC 6.5 12/23/2022    WBC 7.7 09/29/2021    HGB 13.3 12/23/2022    HGB 14.2 09/29/2021    HCT 42.3 12/23/2022    HCT 42.7 09/29/2021     12/23/2022     09/29/2021     BMP:   Lab Results   Component Value Date     12/23/2022     06/29/2022    POTASSIUM 4.5 12/23/2022    POTASSIUM 4.3 06/29/2022    CHLORIDE 105 12/23/2022    CHLORIDE 107 06/29/2022    CO2 29 12/23/2022    CO2 28 06/29/2022    BUN 16.3 12/23/2022    BUN 14 06/29/2022    CR 0.82 12/23/2022    CR 0.80 06/29/2022    GLC 95 12/23/2022    GLC 96 06/29/2022     COAGS: No results found for: PTT, INR, FIBR  POC: No results found for: BGM, HCG, HCGS  HEPATIC:   Lab Results   Component Value Date    ALBUMIN 3.5 06/29/2022    PROTTOTAL 7.5 06/29/2022    ALT 24 06/29/2022    AST 13 06/29/2022    ALKPHOS 66 06/29/2022    BILITOTAL 0.4 06/29/2022     OTHER:   Lab Results   Component Value Date    A1C 5.6 06/29/2022    MARGARETH 9.6 12/23/2022    TSH 3.61 08/31/2021       Anesthesia Plan    ASA Status:  3       Anesthesia Type: MAC.              Consents    Anesthesia Plan(s) and associated risks, benefits, and realistic alternatives discussed. Questions answered and patient/representative(s) expressed understanding.     - Discussed:     - Discussed with:  Patient            Postoperative Care    Pain management: Multi-modal analgesia.        Comments:                SONIA CHOI MD

## 2023-09-22 NOTE — ANESTHESIA CARE TRANSFER NOTE
Patient: Heather Blakely    Procedure: Procedure(s):  ESOPHAGOGASTRODUODENOSCOPY WITH BIOPSY       Diagnosis: Gastroesophageal reflux disease, unspecified whether esophagitis present [K21.9]  Diagnosis Additional Information: No value filed.    Anesthesia Type:   MAC     Note:    Oropharynx: oropharynx clear of all foreign objects  Level of Consciousness: awake  Oxygen Supplementation: nasal cannula  Level of Supplemental Oxygen (L/min / FiO2): 2  Independent Airway: airway patency satisfactory and stable  Dentition: dentition unchanged  Vital Signs Stable: post-procedure vital signs reviewed and stable  Report to RN Given: handoff report given  Patient transferred to: Phase II  Comments: VSS and WNL, comfortable, no PONV, report to Sanjuana ARCE  Handoff Report: Identifed the Patient, Identified the Reponsible Provider, Reviewed the pertinent medical history, Discussed the surgical course, Reviewed Intra-OP anesthesia mangement and issues during anesthesia, Set expectations for post-procedure period and Allowed opportunity for questions and acknowledgement of understanding      Vitals:  Vitals Value Taken Time   BP     Temp     Pulse     Resp     SpO2         Electronically Signed By: CARMEN Moreno CRNA  September 22, 2023  9:09 AM

## 2023-09-25 LAB
PATH REPORT.COMMENTS IMP SPEC: NORMAL
PATH REPORT.COMMENTS IMP SPEC: NORMAL
PATH REPORT.FINAL DX SPEC: NORMAL
PATH REPORT.GROSS SPEC: NORMAL
PATH REPORT.MICROSCOPIC SPEC OTHER STN: NORMAL
PATH REPORT.RELEVANT HX SPEC: NORMAL
PHOTO IMAGE: NORMAL

## 2023-10-05 DIAGNOSIS — K31.A0 GASTRIC INTESTINAL METAPLASIA: Primary | ICD-10-CM

## 2023-10-24 ENCOUNTER — TELEPHONE (OUTPATIENT)
Dept: FAMILY MEDICINE | Facility: CLINIC | Age: 73
End: 2023-10-24
Payer: COMMERCIAL

## 2023-10-24 DIAGNOSIS — K21.9 GASTROESOPHAGEAL REFLUX DISEASE, UNSPECIFIED WHETHER ESOPHAGITIS PRESENT: ICD-10-CM

## 2023-10-24 RX ORDER — OMEPRAZOLE 40 MG/1
40 CAPSULE, DELAYED RELEASE ORAL DAILY
Qty: 30 CAPSULE | Refills: 1 | Status: SHIPPED | OUTPATIENT
Start: 2023-10-24

## 2023-10-24 NOTE — TELEPHONE ENCOUNTER
90 day prescription request for omeprazole (PRILOSEC) 40 MG DR capsule  Quantity: 90  Directions for use: take one capsule by mouth everyday

## 2023-11-22 DIAGNOSIS — I42.9 CARDIOMYOPATHY, UNSPECIFIED TYPE (H): ICD-10-CM

## 2023-11-24 RX ORDER — CARVEDILOL 3.12 MG/1
TABLET ORAL
Qty: 90 TABLET | Refills: 1 | Status: SHIPPED | OUTPATIENT
Start: 2023-11-24 | End: 2024-05-20

## 2024-02-01 SDOH — HEALTH STABILITY: PHYSICAL HEALTH: ON AVERAGE, HOW MANY DAYS PER WEEK DO YOU ENGAGE IN MODERATE TO STRENUOUS EXERCISE (LIKE A BRISK WALK)?: 4 DAYS

## 2024-02-01 SDOH — HEALTH STABILITY: PHYSICAL HEALTH: ON AVERAGE, HOW MANY MINUTES DO YOU ENGAGE IN EXERCISE AT THIS LEVEL?: 20 MIN

## 2024-02-07 ENCOUNTER — OFFICE VISIT (OUTPATIENT)
Dept: FAMILY MEDICINE | Facility: CLINIC | Age: 74
End: 2024-02-07
Payer: COMMERCIAL

## 2024-02-07 VITALS
DIASTOLIC BLOOD PRESSURE: 60 MMHG | SYSTOLIC BLOOD PRESSURE: 94 MMHG | TEMPERATURE: 97.2 F | HEIGHT: 63 IN | WEIGHT: 209.8 LBS | BODY MASS INDEX: 37.17 KG/M2 | HEART RATE: 76 BPM | RESPIRATION RATE: 20 BRPM

## 2024-02-07 DIAGNOSIS — E55.9 HYPOVITAMINOSIS D: ICD-10-CM

## 2024-02-07 DIAGNOSIS — I50.9 CONGESTIVE HEART FAILURE, UNSPECIFIED HF CHRONICITY, UNSPECIFIED HEART FAILURE TYPE (H): ICD-10-CM

## 2024-02-07 DIAGNOSIS — E66.01 MORBID OBESITY (H): ICD-10-CM

## 2024-02-07 DIAGNOSIS — Z00.00 ENCOUNTER FOR MEDICARE ANNUAL WELLNESS EXAM: Primary | ICD-10-CM

## 2024-02-07 DIAGNOSIS — Z23 NEED FOR TDAP VACCINATION: ICD-10-CM

## 2024-02-07 DIAGNOSIS — R20.0 NUMBNESS IN FEET: ICD-10-CM

## 2024-02-07 DIAGNOSIS — M79.645 THUMB PAIN, LEFT: ICD-10-CM

## 2024-02-07 DIAGNOSIS — E78.5 HYPERLIPIDEMIA, UNSPECIFIED HYPERLIPIDEMIA TYPE: ICD-10-CM

## 2024-02-07 LAB
ALBUMIN SERPL BCG-MCNC: 4.2 G/DL (ref 3.5–5.2)
ALP SERPL-CCNC: 75 U/L (ref 40–150)
ALT SERPL W P-5'-P-CCNC: 22 U/L (ref 0–50)
ANION GAP SERPL CALCULATED.3IONS-SCNC: 13 MMOL/L (ref 7–15)
AST SERPL W P-5'-P-CCNC: 25 U/L (ref 0–45)
BILIRUB SERPL-MCNC: 0.4 MG/DL
BUN SERPL-MCNC: 15 MG/DL (ref 8–23)
CALCIUM SERPL-MCNC: 9.3 MG/DL (ref 8.8–10.2)
CHLORIDE SERPL-SCNC: 101 MMOL/L (ref 98–107)
CHOLEST SERPL-MCNC: 216 MG/DL
CREAT SERPL-MCNC: 0.83 MG/DL (ref 0.51–0.95)
DEPRECATED HCO3 PLAS-SCNC: 26 MMOL/L (ref 22–29)
EGFRCR SERPLBLD CKD-EPI 2021: 74 ML/MIN/1.73M2
ERYTHROCYTE [DISTWIDTH] IN BLOOD BY AUTOMATED COUNT: 13 % (ref 10–15)
FASTING STATUS PATIENT QL REPORTED: YES
GLUCOSE SERPL-MCNC: 99 MG/DL (ref 70–99)
HCT VFR BLD AUTO: 43.6 % (ref 35–47)
HDLC SERPL-MCNC: 65 MG/DL
HGB BLD-MCNC: 14.2 G/DL (ref 11.7–15.7)
LDLC SERPL CALC-MCNC: 130 MG/DL
MCH RBC QN AUTO: 28.1 PG (ref 26.5–33)
MCHC RBC AUTO-ENTMCNC: 32.6 G/DL (ref 31.5–36.5)
MCV RBC AUTO: 86 FL (ref 78–100)
NONHDLC SERPL-MCNC: 151 MG/DL
PLATELET # BLD AUTO: 273 10E3/UL (ref 150–450)
POTASSIUM SERPL-SCNC: 4.7 MMOL/L (ref 3.4–5.3)
PROT SERPL-MCNC: 7.6 G/DL (ref 6.4–8.3)
RBC # BLD AUTO: 5.05 10E6/UL (ref 3.8–5.2)
SODIUM SERPL-SCNC: 140 MMOL/L (ref 135–145)
T4 FREE SERPL-MCNC: 0.82 NG/DL (ref 0.9–1.7)
TRIGL SERPL-MCNC: 103 MG/DL
TSH SERPL DL<=0.005 MIU/L-ACNC: 4.6 UIU/ML (ref 0.3–4.2)
WBC # BLD AUTO: 6.8 10E3/UL (ref 4–11)

## 2024-02-07 PROCEDURE — 80061 LIPID PANEL: CPT | Performed by: FAMILY MEDICINE

## 2024-02-07 PROCEDURE — 84443 ASSAY THYROID STIM HORMONE: CPT | Performed by: FAMILY MEDICINE

## 2024-02-07 PROCEDURE — 82607 VITAMIN B-12: CPT | Performed by: FAMILY MEDICINE

## 2024-02-07 PROCEDURE — 36415 COLL VENOUS BLD VENIPUNCTURE: CPT | Performed by: FAMILY MEDICINE

## 2024-02-07 PROCEDURE — G0439 PPPS, SUBSEQ VISIT: HCPCS | Performed by: FAMILY MEDICINE

## 2024-02-07 PROCEDURE — 80053 COMPREHEN METABOLIC PANEL: CPT | Performed by: FAMILY MEDICINE

## 2024-02-07 PROCEDURE — 84439 ASSAY OF FREE THYROXINE: CPT | Performed by: FAMILY MEDICINE

## 2024-02-07 PROCEDURE — 82306 VITAMIN D 25 HYDROXY: CPT | Performed by: FAMILY MEDICINE

## 2024-02-07 PROCEDURE — 85027 COMPLETE CBC AUTOMATED: CPT | Performed by: FAMILY MEDICINE

## 2024-02-07 ASSESSMENT — PAIN SCALES - GENERAL: PAINLEVEL: NO PAIN (0)

## 2024-02-07 NOTE — PATIENT INSTRUCTIONS
"Learning About Being Physically Active  What is physical activity?     Being physically active means doing any kind of activity that gets your body moving.  The types of physical activity that can help you get fit and stay healthy include:  Aerobic or \"cardio\" activities. These make your heart beat faster and make you breathe harder, such as brisk walking, riding a bike, or running. They strengthen your heart and lungs and build up your endurance.  Strength training activities. These make your muscles work against, or \"resist,\" something. Examples include lifting weights or doing push-ups. These activities help tone and strengthen your muscles and bones.  Stretches. These let you move your joints and muscles through their full range of motion. Stretching helps you be more flexible.  Reaching a balance between these three types of physical activity is important because each one contributes to your overall fitness.  What are the benefits of being active?  Being active is one of the best things you can do for your health. It helps you to:  Feel stronger and have more energy to do all the things you like to do.  Focus better at school or work.  Feel, think, and sleep better.  Reach and stay at a healthy weight.  Lose fat and build lean muscle.  Lower your risk for serious health problems, including diabetes, heart attack, high blood pressure, and some cancers.  Keep your heart, lungs, bones, muscles, and joints strong and healthy.  How can you make being active part of your life?  Start slowly. Make it your long-term goal to get at least 30 minutes of exercise on most days of the week. Walking is a good choice. You also may want to do other activities, such as running, swimming, cycling, or playing tennis or team sports.  Pick activities that you like--ones that make your heart beat faster, your muscles stronger, and your muscles and joints more flexible. If you find more than one thing you like doing, do them all. You " "don't have to do the same thing every day.  Get your heart pumping every day. Any activity that makes your heart beat faster and keeps it at that rate for a while counts.  Here are some great ways to get your heart beating faster:  Go for a brisk walk, run, or hike.  Go for a swim or bike ride.  Take an online exercise class or dance.  Play a game of touch football, basketball, or soccer.  Play tennis, pickleball, or racquetball.  Climb stairs.  Even some household chores can be aerobic. Just do them at a faster pace. Raking or mowing the lawn, sweeping the garage, and vacuuming and cleaning your home all can help get your heart rate up.  Strengthen your muscles during the week. You don't have to lift heavy weights or grow big, bulky muscles to get stronger. Doing a few simple activities that make your muscles work against, or \"resist,\" something can help you get stronger. Aim for at least twice a week.  For example, you can:  Do push-ups or sit-ups, which use your own body weight as resistance.  Lift weights or dumbbells or use stretch bands at home or in a gym or community center.  Stretch your muscles often. Stretching will help you as you become more active. It can help you stay flexible and loosen tight muscles. It can also help improve your balance and posture and can be a great way to relax.  Be sure to stretch the muscles you'll be using when you work out. It's best to warm your muscles slightly before you stretch them. Walk or do some other light aerobic activity for a few minutes. Then start stretching.  When you stretch your muscles:  Do it slowly. Stretching is not about going fast or making sudden movements.  Don't push or bounce during a stretch.  Hold each stretch for at least 15 to 30 seconds, if you can. You should feel a stretch in the muscle, but not pain.  Breathe out as you do the stretch. Then breathe in as you hold the stretch. Don't hold your breath.  If you're worried about how more activity " "might affect your health, have a checkup before you start. Follow any special advice your doctor gives you for getting a smart start.  Where can you learn more?  Go to https://www.[employer].net/patiented  Enter W332 in the search box to learn more about \"Learning About Being Physically Active.\"  Current as of: June 6, 2023               Content Version: 13.8    8049-2435 [employer].   Care instructions adapted under license by your healthcare professional. If you have questions about a medical condition or this instruction, always ask your healthcare professional. [employer] disclaims any warranty or liability for your use of this information.      Eating Healthy Foods: Care Instructions  With every meal, you can make healthy food choices. Try to eat a variety of fruits, vegetables, whole grains, lean proteins, and low-fat dairy products. This can help you get the right balance of nutrients, including vitamins and minerals. Small changes add up over time. You can start by adding one healthy food to your meals each day.    Try to make half your plate fruits and vegetables, one-fourth whole grains, and one-fourth lean proteins. Try including dairy with your meals.   Eat more fruits and vegetables. Try to have them with most meals and snacks.   Foods for healthy eating    Fruits    These can be fresh, frozen, canned, or dried.  Try to choose whole fruit rather than fruit juice.  Eat a variety of colors.    Vegetables    These can be fresh, frozen, canned, or dried.  Beans, peas, and lentils count too.    Whole grains    Choose whole-grain breads, cereals, and noodles.  Try brown rice.    Lean proteins    These can include lean meat, poultry, fish, and eggs.  You can also have tofu, beans, peas, lentils, nuts, and seeds.    Dairy    Try milk, yogurt, and cheese.  Choose low-fat or fat-free when you can.  If you need to, use lactose-free milk or fortified plant-based milk products, such as " "soy milk.    Water    Drink water when you're thirsty.  Limit sugar-sweetened drinks, including soda, fruit drinks, and sports drinks.  Where can you learn more?  Go to https://www.[employer].net/patiented  Enter T756 in the search box to learn more about \"Eating Healthy Foods: Care Instructions.\"  Current as of: February 28, 2023               Content Version: 13.8    0282-4624 [employer].   Care instructions adapted under license by your healthcare professional. If you have questions about a medical condition or this instruction, always ask your healthcare professional. [employer] disclaims any warranty or liability for your use of this information.      Nutrition for Older Adults: Care Instructions  Overview     Good nutrition is important at any age. But it is especially important for older adults. Eating a healthy diet helps keep your body strong. And it can help lower your risk for disease.  As you get older, your body needs more of certain nutrients. These include vitamin B12, calcium, and vitamin D. But it may be harder for you to get these and other important nutrients. This could be for many reasons. You may not feel as hungry as you used to. Or you could have problems with your teeth or mouth that make it hard to chew. Or you may not enjoy planning and preparing meals, especially if you live alone.  Talk with your doctor if you want help getting the most nutrition from what you eat. The doctor may have you work with a dietitian to help you plan meals.  Follow-up care is a key part of your treatment and safety. Be sure to make and go to all appointments, and call your doctor if you are having problems. It's also a good idea to know your test results and keep a list of the medicines you take.  How can you care for yourself at home?  To stay healthy  Eat a variety of foods. The more you vary the foods you eat, the more vitamins, minerals, and other nutrients you get.  Take a " multivitamin every day. Choose one with about 100% of the daily value (DV) for vitamins and minerals. Do not take more than 100% of the daily value for any vitamin or mineral unless your doctor tells you to. Talk with your doctor if you are not sure which multivitamin is right for you.  Eat lots of fruits and vegetables. Fresh, frozen, or no-salt canned vegetables and fruits in their own juice or light syrup are good choices.  Include foods that are high in vitamin B12 in your diet. Good choices are fortified breakfast cereal, nonfat or low-fat milk and other dairy products, meat, poultry, fish, and eggs.  Get enough calcium and vitamin D. Good choices include nonfat or low-fat milk, cheese, and yogurt. Other good options are tofu, orange juice with added calcium, and some leafy green vegetables, such as kajal greens and kale. If you don't use milk products, talk to your doctor about calcium and vitamin D supplements.  Eat protein foods every day. Good choices include lean meat, fish, poultry, eggs, and cheese. Other good options are cooked beans, peanut butter, and nuts and seeds.  Choose whole grains for half of the grains you eat. Look for 100% whole wheat bread, whole-grain cereals, brown rice, and other whole grains.  If you have constipation  Eat high-fiber foods every day. These include fruits, vegetables, cooked dried beans, and whole grains.  Drink plenty of fluids. If you have kidney, heart, or liver disease and have to limit fluids, talk with your doctor before you increase the amount of fluids you drink.  Ask your doctor if stool softeners may help keep your bowels regular.  If you have mouth problems that make chewing hard  Pick canned or cooked fruits and vegetables. These are often softer.  Chop or shred meat, poultry, and fish. Add sauce or gravy to the meat to help keep it moist.  Pick other protein foods that are soft. These include cheese, peanut butter, cooked beans, cottage cheese, and  "eggs.  If you have trouble shopping for yourself  Ask a local food store to deliver groceries to your home.  Contact a volunteer center and ask for help.  Ask a family member or neighbor to help you.  If you have trouble preparing meals  If you are able, take a cooking class.  Use a microwave oven to cook TV dinners and other frozen or prepared foods.  Take part in group meal programs. You can find these through senior citizen programs.  Have meals brought to your home. Your community may offer programs that deliver meals, such as Meals on Wheels.  If your appetite is poor  Try eating smaller amounts of food more often. For example, eat 4 or 5 small meals a day instead of 1 or 2 large meals.  Eat with family and friends. Or take part in group meal programs offered through volunteer programs. Eating with others may help your appetite. And it helps you be more social.  Ask your doctor if your medicines could cause appetite or taste problems. If so, ask about changing medicines.  Add spices and herbs to increase the flavor of food.  If you think you are depressed, ask your doctor for help. Depression can affect your appetite. And it can make it hard to do everyday activities like grocery shopping and making meals. Treatment can help.  When should you call for help?  Watch closely for changes in your health, and be sure to contact your doctor if you have any problems.  Where can you learn more?  Go to https://www.[employer].net/patiented  Enter L643 in the search box to learn more about \"Nutrition for Older Adults: Care Instructions.\"  Current as of: February 26, 2023               Content Version: 13.8    5131-5033 [employer].   Care instructions adapted under license by your healthcare professional. If you have questions about a medical condition or this instruction, always ask your healthcare professional. [employer] disclaims any warranty or liability for your use of this " information.      Preventive Care Advice   This is general advice given by our system to help you stay healthy. However, your care team may have specific advice just for you. Please talk to your care team about your preventive care needs.  Nutrition  Eat 5 or more servings of fruits and vegetables each day.  Try wheat bread, brown rice and whole grain pasta (instead of white bread, rice, and pasta).  Get enough calcium and vitamin D. Check the label on foods and aim for 100% of the RDA (recommended daily allowance).  Lifestyle  Exercise at least 150 minutes each week  (30 minutes a day, 5 days a week).  Do muscle strengthening activities 2 days a week. These help control your weight and prevent disease.  No smoking.  Wear sunscreen to prevent skin cancer.  Have a dental exam and cleaning every 6 months.  Yearly exams  See your health care team every year to talk about:  Any changes in your health.  Any medicines your care team has prescribed.  Preventive care, family planning, and ways to prevent chronic diseases.  Shots (vaccines)   HPV shots (up to age 26), if you've never had them before.  Hepatitis B shots (up to age 59), if you've never had them before.  COVID-19 shot: Get this shot when it's due.  Flu shot: Get a flu shot every year.  Tetanus shot: Get a tetanus shot every 10 years.  Pneumococcal, hepatitis A, and RSV shots: Ask your care team if you need these based on your risk.  Shingles shot (for age 50 and up)  General health tests  Diabetes screening:  Starting at age 35, Get screened for diabetes at least every 3 years.  If you are younger than age 35, ask your care team if you should be screened for diabetes.  Cholesterol test: At age 39, start having a cholesterol test every 5 years, or more often if advised.  Bone density scan (DEXA): At age 50, ask your care team if you should have this scan for osteoporosis (brittle bones).  Hepatitis C: Get tested at least once in your life.  STIs (sexually  transmitted infections)  Before age 24: Ask your care team if you should be screened for STIs.  After age 24: Get screened for STIs if you're at risk. You are at risk for STIs (including HIV) if:  You are sexually active with more than one person.  You don't use condoms every time.  You or a partner was diagnosed with a sexually transmitted infection.  If you are at risk for HIV, ask about PrEP medicine to prevent HIV.  Get tested for HIV at least once in your life, whether you are at risk for HIV or not.  Cancer screening tests  Cervical cancer screening: If you have a cervix, begin getting regular cervical cancer screening tests starting at age 21.  Breast cancer scan (mammogram): If you've ever had breasts, begin having regular mammograms starting at age 40. This is a scan to check for breast cancer.  Colon cancer screening: It is important to start screening for colon cancer at age 45.  Have a colonoscopy test every 10 years (or more often if you're at risk) Or, ask your provider about stool tests like a FIT test every year or Cologuard test every 3 years.  To learn more about your testing options, visit:   https://www.[employer]/551746.pdf.  For help making a decision, visit:   https://bit.ly/pn54801.  Prostate cancer screening test: If you have a prostate, ask your care team if a prostate cancer screening test (PSA) at age 55 is right for you.  Lung cancer screening: If you are a current or former smoker ages 50 to 80, ask your care team if ongoing lung cancer screenings are right for you.  For informational purposes only. Not to replace the advice of your health care provider. Copyright   2023 [employer]. All rights reserved. Clinically reviewed by the [facility]. [employer] [id number] - REV 01/24.

## 2024-02-07 NOTE — PROGRESS NOTES
"Preventive Care Visit  [facility] MAGY Delgado MD, Family Medicine  Feb 7, 2024    Assessment & Plan     Encounter for Medicare annual wellness exam  Doing well overall.  Going to quilting retreat in a few months and is excited about this.    Congestive heart failure, unspecified HF chronicity, unspecified heart failure type (H)  Continuing to follow with cardiology.  Laboratory testing below will be done.  - CBC with Platelets; Future  - Comprehensive metabolic panel (BMP + Alb, Alk Phos, ALT, AST, Total. Bili, TP); Future  - CBC with Platelets  - Comprehensive metabolic panel (BMP + Alb, Alk Phos, ALT, AST, Total. Bili, TP)    Hyperlipidemia, unspecified hyperlipidemia type  Recheck cholesterol level  - Lipid panel reflex to direct LDL Fasting; Future  - Lipid panel reflex to direct LDL Fasting    Need for Tdap vaccination  Tdap given    Thumb pain, left  Likely arthritis.  Offered to do x-rays today but patient prefers a orthopedic referral- Orthopedic  Referral; Future    Hypovitaminosis D  - Vitamin D Deficiency; Future  - Vitamin D Deficiency    Numbness in feet  Was seen back in 2019 by the spine clinic.  Noted to have spondylolisthesis of lumbar spine.  It did some physical therapy.  Was having numbness at that time as well.  Will let me know if it worsens.  - TSH with free T4 reflex; Future  - Vitamin B12; Future  - TSH with free T4 reflex  - Vitamin B12    Morbid obesity  Continue to work with physical activity and diet.            BMI  Estimated body mass index is 36.87 kg/m  as calculated from the following:    Height as of this encounter: 1.607 m (5' 3.25\").    Weight as of this encounter: 95.2 kg (209 lb 12.8 oz).   Weight management plan: Discussed healthy diet and exercise guidelines    Counseling  Appropriate preventive services were discussed with this patient, including applicable screening as appropriate for fall prevention, nutrition, physical activity, " Tobacco-use cessation, weight loss and cognition.  Checklist reviewing preventive services available has been given to the patient.  Reviewed patient's diet, addressing concerns and/or questions.   The patient was instructed to see the dentist every 6 months.             Yfn Romero is a 74 year old, presenting for the following:  Medicare Visit        2/7/2024     7:40 AM   Additional Questions   Roomed by Sarahi MARCH   Accompanied by self         2/7/2024     7:40 AM   Patient Reported Additional Medications   Patient reports taking the following new medications none         Health Care Directive  Patient does not have a Health Care Directive or Living Will: Patient states has Advance Directive and will bring in a copy to clinic.    HPI  She is doing well overall.  Will be going on a quilting retreat later this winter.    Having some left-sided thumb pain.  Patient is left-handed.  Has seen orthopedics in the past but would like to see them again.    Numbness in feet which is bilateral but worse on the right.  Seems to be positional.  Had worked with the spine clinic in the past.  Does not think that she is ready to go back and see them but just wanted to mention it today.            2/1/2024   General Health   How would you rate your overall physical health? Good   Feel stress (tense, anxious, or unable to sleep) Not at all         2/1/2024   Nutrition   Diet: Regular (no restrictions)         2/1/2024   Exercise   Days per week of moderate/strenous exercise 4 days   Average minutes spent exercising at this level 20 min         2/1/2024   Social Factors   Worry food won't last until get money to buy more No   Food not last or not have enough money for food? No   Do you have housing?  Yes   Are you worried about losing your housing? No   Lack of transportation? No   Unable to get utilities (heat,electricity)? No         2/1/2024   Fall Risk   Fallen 2 or more times in the past year? No    No   Trouble with  walking or balance? No          2024   Activities of Daily Living- Home Safety   Needs help with the following daily activites None of the above   Safety concerns in the home None of the above         2024   Dental   Dentist two times every year? (!) NO         2024   Hearing Screening   Hearing concerns? None of the above         2024   Driving Risk Screening   Patient/family members have concerns about driving No         2024   General Alertness/Fatigue Screening   Have you been more tired than usual lately? No         2024   Urinary Incontinence Screening   Bothered by leaking urine in past 6 months No         2024   TB Screening   Were you born outside of [place]?  No         Today's PHQ-2 Score:       2024     7:38 AM   PHQ-2 (  [employer])   Q1: Little interest or pleasure in doing things 0   Q2: Feeling down, depressed or hopeless 0   PHQ-2 Score 0   Q1: Little interest or pleasure in doing things Not at all   Q2: Feeling down, depressed or hopeless Not at all   PHQ-2 Score 0           2024   Substance Use   Alcohol more than 3/day or more than 7/wk Not Applicable   Do you have a current opioid prescription? No   How severe/bad is pain from 1 to 10? 3/10   Do you use any other substances recreationally? No     Social History     Tobacco Use    Smoking status: Former     Packs/day: 0.00     Years: 0.00     Additional pack years: 0.00     Total pack years: 0.00     Types: Cigarettes     Quit date: 1975     Years since quittin.1    Smokeless tobacco: Never   Vaping Use    Vaping Use: Never used   Substance Use Topics    Alcohol use: No    Drug use: No            No data to display                 Mammogram Screening - Mammogram every 1-2 years updated in Health Maintenance based on mutual decision making    The 10-year ASCVD risk score (Olivia VENEGAS, et al., 2019) is: 7.3%    Values used to calculate the score:      Age: 74 years      Sex: Female      Is Non-   "American: No      Diabetic: No      Tobacco smoker: No      Systolic Blood Pressure: 94 mmHg      Is BP treated: No      HDL Cholesterol: 69 mg/dL      Total Cholesterol: 214 mg/dL            Reviewed and updated as needed this visit by Provider                      Current providers sharing in care for this patient include:  Patient Care Team:  Carrie Delgado MD as PCP - General  Carrie Delgado MD as Assigned PCP  Sanjuana Baird PA-C as Physician Assistant (Gastroenterology)    The following health maintenance items are reviewed in Epic and correct as of today:  Health Maintenance   Topic Date Due    DTAP/TDAP/TD IMMUNIZATION (2 - Td or Tdap) 08/23/2020    BMP  06/23/2023    ALT  06/29/2023    LIPID  12/23/2023    ANNUAL REVIEW OF HM ORDERS  07/10/2024    HF ACTION PLAN  08/31/2024    MEDICARE ANNUAL WELLNESS VISIT  02/07/2025    FALL RISK ASSESSMENT  02/07/2025    CBC  02/07/2025    MAMMO SCREENING  07/24/2025    COLORECTAL CANCER SCREENING  08/16/2025    GLUCOSE  12/23/2025    ADVANCE CARE PLANNING  12/23/2027    DEXA  02/07/2038    TSH W/FREE T4 REFLEX  Completed    PHQ-2 (once per calendar year)  Completed    INFLUENZA VACCINE  Completed    Pneumococcal Vaccine: 65+ Years  Completed    ZOSTER IMMUNIZATION  Completed    RSV VACCINE (Pregnancy & 60+)  Completed    COVID-19 Vaccine  Completed    IPV IMMUNIZATION  Aged Out    HPV IMMUNIZATION  Aged Out    MENINGITIS IMMUNIZATION  Aged Out    RSV MONOCLONAL ANTIBODY  Aged Out    HEPATITIS C SCREENING  Discontinued     Review of Systems    Review of Systems  Constitutional, HEENT, cardiovascular, pulmonary, GI, , musculoskeletal, neuro, skin, endocrine and psych systems are negative, except as otherwise noted.     Objective    Exam  BP 94/60   Pulse 76   Temp 97.2  F (36.2  C) (Tympanic)   Resp 20   Ht 1.607 m (5' 3.25\")   Wt 95.2 kg (209 lb 12.8 oz)   BMI 36.87 kg/m     Estimated body mass index is 36.87 kg/m  as calculated from the " "following:    Height as of this encounter: 1.607 m (5' 3.25\").    Weight as of this encounter: 95.2 kg (209 lb 12.8 oz).    Physical Exam  Objective:  Vital signs reviewed and stable  General: No acute distress  Psych: Appropriate affect  HEENT: moist mucous membranes, pupils equal, round, reactive to light and accomodation, tympanic membranes are pearly grey bilaterally  Lymph: no cervical or supraclavicular lymphadenopathy  Cardiovascular: regular rate and rhythm with no murmur  Pulmonary: clear to auscultation bilaterally with no wheeze  Abdomen: soft, non tender, non distended with normo-active bowel sounds  Extremities: warm and well perfused with no edema  Skin: warm and dry with no rash          2/7/2024   Mini Cog   Clock Draw Score 2 Normal   3 Item Recall 3 objects recalled   Mini Cog Total Score 5            Signed Electronically by: Carrie Delgado MD    "

## 2024-02-07 NOTE — PROGRESS NOTES
Preventive Care Visit  [facility] MAGY Delgado MD, Family Medicine  Feb 7, 2024  {Provider  Link to SmartSet :228541}  {PROVIDER CHARTING PREFERENCE:541526}    Yfn Romero is a 74 year old, presenting for the following:  Medicare Visit        2/7/2024     7:40 AM   Additional Questions   Roomed by Sarahi MARCH   Accompanied by self         2/7/2024     7:40 AM   Patient Reported Additional Medications   Patient reports taking the following new medications none     {ROOMER if patient is in their first year of Medicare a vision screen is required click here to document the Vison screen and then refresh the note to pull in results  :810619}    Health Care Directive  Patient does not have a Health Care Directive or Living Will: {ADVANCE_DIRECTIVE_STATUS:364668}    HPI  ***  {MA/LPN/RN Pre-Provider Visit Orders- hCG/UA/Strep (Optional):366892}  {SUPERLIST (Optional):457152}  {additonal problems for provider to add (Optional):039321}      2/1/2024   General Health   How would you rate your overall physical health? Good   Feel stress (tense, anxious, or unable to sleep) Not at all         2/1/2024   Nutrition   Diet: Regular (no restrictions)         2/1/2024   Exercise   Days per week of moderate/strenous exercise 4 days   Average minutes spent exercising at this level 20 min         2/1/2024   Social Factors   Worry food won't last until get money to buy more No   Food not last or not have enough money for food? No   Do you have housing?  Yes   Are you worried about losing your housing? No   Lack of transportation? No   Unable to get utilities (heat,electricity)? No         2/1/2024   Fall Risk   Fallen 2 or more times in the past year? No    No   Trouble with walking or balance? No          2/1/2024   Activities of Daily Living- Home Safety   Needs help with the following daily activites None of the above   Safety concerns in the home None of the above         2/1/2024   Dental    Dentist two times every year? (!) NO         2024   Hearing Screening   Hearing concerns? None of the above         2024   Driving Risk Screening   Patient/family members have concerns about driving No         2024   General Alertness/Fatigue Screening   Have you been more tired than usual lately? No         2024   Urinary Incontinence Screening   Bothered by leaking urine in past 6 months No         2024   TB Screening   Were you born outside of [place]?  No         Today's PHQ-2 Score:       2024     7:38 AM   PHQ-2 (  [employer])   Q1: Little interest or pleasure in doing things 0   Q2: Feeling down, depressed or hopeless 0   PHQ-2 Score 0   Q1: Little interest or pleasure in doing things Not at all   Q2: Feeling down, depressed or hopeless Not at all   PHQ-2 Score 0           2024   Substance Use   Alcohol more than 3/day or more than 7/wk Not Applicable   Do you have a current opioid prescription? No   How severe/bad is pain from 1 to 10? 3/10   Do you use any other substances recreationally? No     Social History     Tobacco Use    Smoking status: Former     Packs/day: 0.00     Years: 0.00     Additional pack years: 0.00     Total pack years: 0.00     Types: Cigarettes     Quit date:      Years since quittin.1    Smokeless tobacco: Never   Substance Use Topics    Alcohol use: No    Drug use: No     {If there are gaps in the social history shown above, please follow the link to update and then refresh the note Link to Social and Substance History :822200}       No data to display              {If any of the questions to the S7 are answered yes, consider referral for genetic counseling.    Additional indications for genetic referral include personal history of breast or ovarian cancer, genetic mutation in 1st degree relative which increases risk of breast cancer including BRCA1, BRCA2, JAVIER, PALB 2, TP53, CHEK2, PTEN, CDH1, STK11 (per ACS) and/or 1st degree relative with  history of pancreatic or high-risk prostate cancer (per NCCN):957776}   {Mammogram Decision Support (Optional):467715}    The 10-year ASCVD risk score (Olivia VENEGAS, et al., 2019) is: 9.7%    Values used to calculate the score:      Age: 74 years      Sex: Female      Is Non- : No      Diabetic: No      Tobacco smoker: No      Systolic Blood Pressure: 109 mmHg      Is BP treated: No      HDL Cholesterol: 69 mg/dL      Total Cholesterol: 214 mg/dL    {Link to Fracture Risk Assessment Tool (Optional):571976}    {Use the storyboard to review patient history, after sections have been marked as reviewed, refresh note to capture documentation:322396}  { REQUIRED AWV use this link to review and update sexual activity history  after section has been marked as reviewed, refresh note to capture documentation:150788}  Reviewed and updated as needed this visit by Provider                    {HISTORY OPTIONS (Optional):486350}  Current providers sharing in care for this patient include:  Patient Care Team:  Carrie Delgado MD as PCP - General  Carrie Delgado MD as Assigned PCP  Sanjuana Baird PA-C as Physician Assistant (Gastroenterology)    The following health maintenance items are reviewed in Epic and correct as of today:  Health Maintenance   Topic Date Due    DTAP/TDAP/TD IMMUNIZATION (2 - Td or Tdap) 08/23/2020    BMP  06/23/2023    ALT  06/29/2023    LIPID  12/23/2023    CBC  12/23/2023    ANNUAL REVIEW OF HM ORDERS  07/10/2024    HF ACTION PLAN  08/31/2024    MEDICARE ANNUAL WELLNESS VISIT  02/07/2025    FALL RISK ASSESSMENT  02/07/2025    MAMMO SCREENING  07/24/2025    COLORECTAL CANCER SCREENING  08/16/2025    GLUCOSE  12/23/2025    ADVANCE CARE PLANNING  12/23/2027    DEXA  02/07/2038    TSH W/FREE T4 REFLEX  Completed    PHQ-2 (once per calendar year)  Completed    INFLUENZA VACCINE  Completed    Pneumococcal Vaccine: 65+ Years  Completed    ZOSTER IMMUNIZATION  Completed    RSV  "VACCINE (Pregnancy & 60+)  Completed    COVID-19 Vaccine  Completed    IPV IMMUNIZATION  Aged Out    HPV IMMUNIZATION  Aged Out    MENINGITIS IMMUNIZATION  Aged Out    RSV MONOCLONAL ANTIBODY  Aged Out    HEPATITIS C SCREENING  Discontinued     Review of Systems  {ROS Picklists (Optional):863902}     Objective    Exam  There were no vitals taken for this visit.   Estimated body mass index is 36.67 kg/m  as calculated from the following:    Height as of 9/22/23: 1.6 m (5' 3\").    Weight as of 9/22/23: 93.9 kg (207 lb).    Physical Exam  {Exam Choices (Optional):809899}  {The Mini-Cog is incomplete, use link to complete and refresh note Link to Mini-Cog :428209}       No data to display              {A Mini-Cog total score of 0-2 suggests the possibility of dementia, score of 3-5 suggests no dementia:214223}       Signed Electronically by: Carrie Delgado MD  {Email feedback regarding this note to [email or web address]   :150223}  "

## 2024-02-08 LAB
VIT B12 SERPL-MCNC: 326 PG/ML (ref 232–1245)
VIT D+METAB SERPL-MCNC: 66 NG/ML (ref 20–50)

## 2024-03-01 ENCOUNTER — TRANSFERRED RECORDS (OUTPATIENT)
Dept: HEALTH INFORMATION MANAGEMENT | Facility: CLINIC | Age: 74
End: 2024-03-01
Payer: COMMERCIAL

## 2024-05-06 DIAGNOSIS — E03.9 HYPOTHYROIDISM, UNSPECIFIED TYPE: Primary | ICD-10-CM

## 2024-05-06 NOTE — PROGRESS NOTES
"Heather Blakely has an upcoming lab appointment:    Future Appointments   Date Time Provider Department Center   5/8/2024  9:00 AM [facility]     Patient is scheduled for the following lab(s): Patient lab appointment note states, \"Thyroid labs.\"    There is no order available. Please review and place either future orders or HMPO (Review of Health Maintenance Protocol Orders), as appropriate.    There are no preventive care reminders to display for this patient.    Thank you  Jia Ram"

## 2024-05-08 ENCOUNTER — LAB (OUTPATIENT)
Dept: LAB | Facility: CLINIC | Age: 74
End: 2024-05-08
Payer: COMMERCIAL

## 2024-05-08 DIAGNOSIS — E03.9 HYPOTHYROIDISM, UNSPECIFIED TYPE: ICD-10-CM

## 2024-05-08 LAB
T4 FREE SERPL-MCNC: 0.81 NG/DL (ref 0.9–1.7)
TSH SERPL DL<=0.005 MIU/L-ACNC: 4.64 UIU/ML (ref 0.3–4.2)

## 2024-05-08 PROCEDURE — 84439 ASSAY OF FREE THYROXINE: CPT

## 2024-05-08 PROCEDURE — 84443 ASSAY THYROID STIM HORMONE: CPT

## 2024-05-08 PROCEDURE — 36415 COLL VENOUS BLD VENIPUNCTURE: CPT

## 2024-05-20 DIAGNOSIS — I42.9 CARDIOMYOPATHY, UNSPECIFIED TYPE (H): ICD-10-CM

## 2024-05-20 RX ORDER — CARVEDILOL 3.12 MG/1
TABLET ORAL
Qty: 90 TABLET | Refills: 1 | Status: SHIPPED | OUTPATIENT
Start: 2024-05-20 | End: 2024-08-09

## 2024-07-14 ENCOUNTER — MYC MEDICAL ADVICE (OUTPATIENT)
Dept: FAMILY MEDICINE | Facility: CLINIC | Age: 74
End: 2024-07-14
Payer: COMMERCIAL

## 2024-07-14 DIAGNOSIS — R06.83 SNORING: Primary | ICD-10-CM

## 2024-07-14 DIAGNOSIS — G47.30 SLEEP APNEA, UNSPECIFIED TYPE: ICD-10-CM

## 2024-07-25 ENCOUNTER — HOSPITAL ENCOUNTER (OUTPATIENT)
Dept: MAMMOGRAPHY | Facility: CLINIC | Age: 74
Discharge: HOME | End: 2024-07-25
Attending: FAMILY MEDICINE | Admitting: FAMILY MEDICINE
Payer: COMMERCIAL

## 2024-07-25 DIAGNOSIS — Z12.31 VISIT FOR SCREENING MAMMOGRAM: ICD-10-CM

## 2024-07-25 PROCEDURE — 77063 BREAST TOMOSYNTHESIS BI: CPT

## 2024-07-31 RX ORDER — ROSUVASTATIN CALCIUM 5 MG/1
TABLET, COATED ORAL
COMMUNITY

## 2024-08-09 ENCOUNTER — OFFICE VISIT (OUTPATIENT)
Dept: FAMILY MEDICINE | Facility: CLINIC | Age: 74
End: 2024-08-09
Payer: COMMERCIAL

## 2024-08-09 VITALS
DIASTOLIC BLOOD PRESSURE: 72 MMHG | TEMPERATURE: 97.2 F | SYSTOLIC BLOOD PRESSURE: 98 MMHG | WEIGHT: 212.9 LBS | BODY MASS INDEX: 37.42 KG/M2 | RESPIRATION RATE: 16 BRPM | OXYGEN SATURATION: 96 % | HEART RATE: 86 BPM

## 2024-08-09 DIAGNOSIS — G25.81 RESTLESS LEGS SYNDROME: ICD-10-CM

## 2024-08-09 DIAGNOSIS — E66.01 MORBID OBESITY (H): ICD-10-CM

## 2024-08-09 DIAGNOSIS — K21.9 GASTROESOPHAGEAL REFLUX DISEASE, UNSPECIFIED WHETHER ESOPHAGITIS PRESENT: ICD-10-CM

## 2024-08-09 DIAGNOSIS — E03.8 SUBCLINICAL HYPOTHYROIDISM: ICD-10-CM

## 2024-08-09 DIAGNOSIS — M67.921 TENDINOPATHY OF RIGHT BICEPS TENDON: Primary | ICD-10-CM

## 2024-08-09 DIAGNOSIS — I42.9 CARDIOMYOPATHY, UNSPECIFIED TYPE (H): ICD-10-CM

## 2024-08-09 LAB
ANION GAP SERPL CALCULATED.3IONS-SCNC: 10 MMOL/L (ref 7–15)
BUN SERPL-MCNC: 15.1 MG/DL (ref 8–23)
CALCIUM SERPL-MCNC: 9.1 MG/DL (ref 8.8–10.4)
CHLORIDE SERPL-SCNC: 102 MMOL/L (ref 98–107)
CREAT SERPL-MCNC: 0.81 MG/DL (ref 0.51–0.95)
EGFRCR SERPLBLD CKD-EPI 2021: 76 ML/MIN/1.73M2
ERYTHROCYTE [DISTWIDTH] IN BLOOD BY AUTOMATED COUNT: 12.9 % (ref 10–15)
GLUCOSE SERPL-MCNC: 94 MG/DL (ref 70–99)
HCO3 SERPL-SCNC: 29 MMOL/L (ref 22–29)
HCT VFR BLD AUTO: 43.8 % (ref 35–47)
HGB BLD-MCNC: 14 G/DL (ref 11.7–15.7)
IRON BINDING CAPACITY (ROCHE): 308 UG/DL (ref 240–430)
IRON SATN MFR SERPL: 40 % (ref 15–46)
IRON SERPL-MCNC: 124 UG/DL (ref 37–145)
MCH RBC QN AUTO: 28.2 PG (ref 26.5–33)
MCHC RBC AUTO-ENTMCNC: 32 G/DL (ref 31.5–36.5)
MCV RBC AUTO: 88 FL (ref 78–100)
PLATELET # BLD AUTO: 276 10E3/UL (ref 150–450)
POTASSIUM SERPL-SCNC: 4.7 MMOL/L (ref 3.4–5.3)
RBC # BLD AUTO: 4.97 10E6/UL (ref 3.8–5.2)
SODIUM SERPL-SCNC: 141 MMOL/L (ref 135–145)
T4 FREE SERPL-MCNC: 0.86 NG/DL (ref 0.9–1.7)
TSH SERPL DL<=0.005 MIU/L-ACNC: 5.54 UIU/ML (ref 0.3–4.2)
WBC # BLD AUTO: 6.8 10E3/UL (ref 4–11)

## 2024-08-09 PROCEDURE — 83540 ASSAY OF IRON: CPT | Performed by: FAMILY MEDICINE

## 2024-08-09 PROCEDURE — 85027 COMPLETE CBC AUTOMATED: CPT | Performed by: FAMILY MEDICINE

## 2024-08-09 PROCEDURE — 84439 ASSAY OF FREE THYROXINE: CPT | Performed by: FAMILY MEDICINE

## 2024-08-09 PROCEDURE — 36415 COLL VENOUS BLD VENIPUNCTURE: CPT | Performed by: FAMILY MEDICINE

## 2024-08-09 PROCEDURE — 84443 ASSAY THYROID STIM HORMONE: CPT | Performed by: FAMILY MEDICINE

## 2024-08-09 PROCEDURE — 80048 BASIC METABOLIC PNL TOTAL CA: CPT | Performed by: FAMILY MEDICINE

## 2024-08-09 PROCEDURE — 99214 OFFICE O/P EST MOD 30 MIN: CPT | Performed by: FAMILY MEDICINE

## 2024-08-09 PROCEDURE — G2211 COMPLEX E/M VISIT ADD ON: HCPCS | Performed by: FAMILY MEDICINE

## 2024-08-09 PROCEDURE — 83550 IRON BINDING TEST: CPT | Performed by: FAMILY MEDICINE

## 2024-08-09 RX ORDER — CARVEDILOL 3.12 MG/1
TABLET ORAL
Qty: 90 TABLET | Refills: 1 | Status: SHIPPED | OUTPATIENT
Start: 2024-08-09

## 2024-08-09 RX ORDER — OMEPRAZOLE 40 MG/1
40 CAPSULE, DELAYED RELEASE ORAL DAILY
Qty: 30 CAPSULE | Refills: 1 | Status: CANCELLED | OUTPATIENT
Start: 2024-08-09

## 2024-08-09 NOTE — PROGRESS NOTES
"  Assessment & Plan     Tendinopathy of right biceps tendon  Physical therapy exercises given.  Referral to physical therapy placed.  Diclofenac sent to the pharmacy that she will take twice daily for 10 days with food to see if this can help decrease the inflammation.  We discussed risk and benefits of the medication    - Physical Therapy  Referral; Future  - diclofenac (VOLTAREN) 50 MG EC tablet; Take 1 tablet (50 mg) by mouth 2 times daily With food    Restless legs syndrome  She will start paying more attention to when this flares (whether she is busy that day or more sedentary).  If she would like to trial a medication she will let me know and I can send the Mirapex to the pharmacy.  - CBC with platelets; Future  - Basic metabolic panel  (Ca, Cl, CO2, Creat, Gluc, K, Na, BUN); Future  - TSH with free T4 reflex; Future  - CBC with platelets  - Basic metabolic panel  (Ca, Cl, CO2, Creat, Gluc, K, Na, BUN)  - TSH with free T4 reflex    Subclinical hypothyroidism  Recheck levels today  - Iron and iron binding capacity; Future  - TSH with free T4 reflex; Future  - Iron and iron binding capacity  - TSH with free T4 reflex    Cardiomyopathy, unspecified type (H)  Refill  - carvedilol (COREG) 3.125 MG tablet; TAKE 1 TABLET BY MOUTH EVERY DAY  - Iron and iron binding capacity; Future  - Iron and iron binding capacity    Gastroesophageal reflux disease, unspecified whether esophagitis present  Overall doing well    Morbid obesity (H)  Stable.  Patient staying active.  Walking several times weekly.          BMI  Estimated body mass index is 37.42 kg/m  as calculated from the following:    Height as of 2/7/24: 1.607 m (5' 3.25\").    Weight as of this encounter: 96.6 kg (212 lb 14.4 oz).   Weight management plan: Discussed healthy diet and exercise guidelines          Yfn Romero is a 74 year old, presenting for the following health issues:  Shoulder Pain        8/9/2024     7:40 AM   Additional Questions "   Roomed by LEANA Jones   Accompanied by self     Shoulder Pain    History of Present Illness       Reason for visit:  Upper arm/shoulder pain- right  Symptom onset:  More than a month  Symptoms include:  Pain when moving right arm  Symptom intensity:  Severe  Symptom progression:  Worsening  Had these symptoms before:  No  What makes it worse:  No  What makes it better:  No    She eats 2-3 servings of fruits and vegetables daily.She consumes 0 sweetened beverage(s) daily.She exercises with enough effort to increase her heart rate 10 to 19 minutes per day.  She exercises with enough effort to increase her heart rate 5 days per week.   She is taking medications regularly.     Pain in right arm/ shoulder has been intermittent over the past couple of years but has been more consistent over the last 8 weeks -no trauma to the area.  Pain with lifting starting in the biceps tendon area and radiating to the shoulder.  No swelling or bruising that she has noted.    Additional concern for restless legs at night.  2 or 3 nights weekly.  His legs feel jumpy.  Cannot think of any triggers.  Does do some stretching at night.  Reporting occasional nerve pain in legs.                   Objective    Pulse 86   Temp 97.2  F (36.2  C) (Tympanic)   Resp 16   Wt 96.6 kg (212 lb 14.4 oz)   SpO2 96%   BMI 37.42 kg/m    Body mass index is 37.42 kg/m .  Physical Exam   Objective:  Vital signs reviewed and stable  General: No acute distress  Psych: Appropriate affect  HEENT: moist mucous membranes  Cardiovascular: regular rate and rhythm with no murmur  Pulmonary: clear to auscultation bilaterally with no wheeze  Abdomen: soft, non tender, non distended with normo-active bowel sounds  Extremities: warm and well perfused with no edema  Musculoskeletal: Tenderness palpation over the short head of the biceps tendon.  Empty can test and uppercut test are both positive.  Good strength.  Limited range of motion with lifting arm above  shoulder level due to pain in that biceps tendon area.  Skin: warm and dry with no rash              Signed Electronically by: Carrie Delgado MD

## 2024-08-09 NOTE — PATIENT INSTRUCTIONS
Patient Education   Biceps Tendinitis: Exercises  Introduction  Here are some examples of exercises for you to try. The exercises may be suggested for a condition or for rehabilitation. Start each exercise slowly. Ease off the exercises if you start to have pain.  You will be told when to start these exercises and which ones will work best for you.  How to do the exercises  Biceps stretch    Stand and hold your affected arm out to the side, with your hand at about hip level.  Gently bend your wrist back so that your fingers point down toward the floor.  You may also do this next to a wall and rest your fingers on the wall.  For more of a stretch, bend your head to the opposite side of your affected arm.  Hold for 15 to 30 seconds.  Repeat 2 to 4 times.  It's a good idea to repeat these steps with your other arm.  Resisted forearm supination    Sit leaning forward with your legs slightly apart. Then place the forearm of your affected arm on your thigh, with your wrist in front of your knee.  Grasp one end of an exercise band with your palm down, and step on the other end.  Keeping your wrist straight, roll your palm outward as far as you can for a count of 2. Then slowly roll your palm back to the starting position to a count of 5.  Repeat 8 to 12 times.  It's a good idea to repeat these steps with your other arm.  Resisted elbow flexion    Using your affected arm, hold one end of an elastic band in your hand.  Place the other end of the band under your foot on the same side of your body as your affected arm.  Slowly bend your elbow and bring your hand toward your shoulder. Your palm and the underside of your wrist should be facing up as you pull the band toward your shoulder. Count to 2 as you pull up.  Relax and slowly return to your starting position. Count to 5 as you return to the start.  Repeat 8 to 12 times.  It's a good idea to repeat these steps with your other arm.  Resisted elbow flexion at shoulder  "level    Tie the ends of an exercise band together to form a loop. Attach one end of the loop to a secure object, or shut a door on it to hold it in place. Or you can tie a knot in one end of the band and shut the door with the knot on the other side. The band should be at shoulder level.  Stand or sit facing where you have tied or fastened the band.  Hold your affected arm straight out while holding the band in your hand with your palm facing in.  Slowly bend your elbow to 90 degrees (like the letter \"L), bringing your hand toward your forehead. Count to 2 as you pull the band toward your head.  Count to 5 as you slowly return to your starting position.  Repeat 8 to 12 times.  It's a good idea to repeat these steps with your other arm.  Follow-up care is a key part of your treatment and safety. Be sure to make and go to all appointments, and call your doctor if you are having problems. It's also a good idea to know your test results and keep a list of the medicines you take.  Current as of: July 17, 2023               Content Version: 14.0    [id number] [employer].   Care instructions adapted under license by your healthcare professional. If you have questions about a medical condition or this instruction, always ask your healthcare professional. [employer] disclaims any warranty or liability for your use of this information.         "

## 2024-08-14 ENCOUNTER — MYC MEDICAL ADVICE (OUTPATIENT)
Dept: FAMILY MEDICINE | Facility: CLINIC | Age: 74
End: 2024-08-14
Payer: COMMERCIAL

## 2024-08-14 DIAGNOSIS — E03.9 HYPOTHYROIDISM, UNSPECIFIED TYPE: Primary | ICD-10-CM

## 2024-08-14 RX ORDER — LEVOTHYROXINE SODIUM 50 UG/1
50 TABLET ORAL DAILY
Qty: 90 TABLET | Refills: 0 | Status: SHIPPED | OUTPATIENT
Start: 2024-08-14

## 2024-08-24 ASSESSMENT — ACTIVITIES OF DAILY LIVING (ADL)
WASHING_YOUR_BACK: 6
CARRYING_A_HEAVY_OBJECT_OF_10_POUNDS: 6
TOUCHING_THE_BACK_OF_YOUR_NECK: 0
PUTTING_ON_AN_UNDERSHIRT_OR_A_PULLOVER_SWEATER: 4
PUSHING_WITH_THE_INVOLVED_ARM: 4
WHEN_LYING_ON_THE_INVOLVED_SIDE: 6
REMOVING_SOMETHING_FROM_YOUR_BACK_POCKET: 5
PUTTING_ON_YOUR_PANTS: 0
AT_ITS_WORST?: 6
PLEASE_INDICATE_YOR_PRIMARY_REASON_FOR_REFERRAL_TO_THERAPY:: SHOULDER
PUTTING_ON_A_SHIRT_THAT_BUTTONS_DOWN_THE_FRONT: 0
REACHING_FOR_SOMETHING_ON_A_HIGH_SHELF: 4

## 2024-08-28 ENCOUNTER — THERAPY VISIT (OUTPATIENT)
Dept: PHYSICAL THERAPY | Facility: CLINIC | Age: 74
End: 2024-08-28
Attending: FAMILY MEDICINE
Payer: COMMERCIAL

## 2024-08-28 DIAGNOSIS — M67.921 TENDINOPATHY OF RIGHT BICEPS TENDON: ICD-10-CM

## 2024-08-28 PROCEDURE — 97530 THERAPEUTIC ACTIVITIES: CPT | Mod: GP

## 2024-08-28 PROCEDURE — 97110 THERAPEUTIC EXERCISES: CPT | Mod: GP

## 2024-08-28 PROCEDURE — 97161 PT EVAL LOW COMPLEX 20 MIN: CPT | Mod: GP

## 2024-08-28 NOTE — PROGRESS NOTES
PHYSICAL THERAPY EVALUATION  Type of Visit: Evaluation       Fall Risk Screen:  Fall screen completed by: PT  Have you fallen 2 or more times in the past year?: No  Have you fallen and had an injury in the past year?: No  Is patient a fall risk?: No    Subjective       Presenting condition or subjective complaint: Pain in upper arm as well as shoulder and under arm. She notes her pain is worst in the morning. She is having a lot of pain over biceps tendon that radiates up in to anterior shoulder and armpit. She is left hand dominant. Slight improvement since onset (~2 yrs ago initially (horizontal abd and heard a pop) with flare-ups intermittently (most recent started 6/5/24))  Date of onset: 06/05/24    Relevant medical history:     Dates & types of surgery: Hip replacement 10/2021. Knee replacement 07/2007    Prior diagnostic imaging/testing results:       Prior therapy history for the same diagnosis, illness or injury: No      Living Environment  Social support: With family members   Type of home: [facility]   Stairs to enter the home: Yes 4 Is there a railing: Yes     Ramp: No   Stairs inside the home: Yes 15 Is there a railing: Yes     Help at home: None  Equipment owned: Straight Cane; Walker; Walker with wheels     Employment: No    Hobbies/Interests: Quilting/sewing    Patient goals for therapy: Move are without pain    Pain assessment: See objective evaluation for additional pain details     Objective   SHOULDER EVALUATION  PAIN: Pain is Exacerbated By: palm down reaching away from body. Lifting with R arm only (>5#).  Reaching with R arm only. Washing hair/personal care. Sleeping (waking when turning over).    Pain is Relieved By: otc medications, rest, and tried topicals without relief.   POSTURE: Sitting Posture: Rounded shoulders, Forward head, Thoracic kyphosis increased    ROM/STRENGTH:   (Degrees) Left AROM Left Strength Right AROM  Right Strength   Shoulder Flexion 150 5- 130 (pain) 4 (pain)    Shoulder Extension       Shoulder Abduction 140 5- 105 (stuck - pull down ribcage) 4 (pain)   Shoulder Adduction       Shoulder Internal Rotation T8 5- T10 4 (mild pain)   Shoulder External Rotation T5 5- T4 4- (pain)   Shoulder Horizontal Abduction       Shoulder Horizontal Adduction       Shoulder Flexion ER       Shoulder Flexion IR       Elbow Extension  5-  5-    Elbow Flexion  5-  5- (pain)   5/5 and pain free for brachioradialis strength testing. 5-/5 strength and painful for shoulder extension (latissimus dorsi testing)  FLEXIBILITY:  Limited cervical motion due to high tension in UT B (R>L)  PALPATION:  TTP over proximal biceps tendon, bicipital groove, coracobrachialis, and latissimus dorsi insertion at greater tubercle.   JOINT MOBILITY:  mild limitation in all motions for GHJ motion.   CERVICAL SCREEN:  Mild limitation in B side bend motion due to muscle tension.     Assessment & Plan   CLINICAL IMPRESSIONS  Medical Diagnosis: Tendenopathy of Right biceps tendon    Treatment Diagnosis: Tendenopathy of Right biceps tendon   Impression/Assessment: Patient is a 74 year old female with right shoulder and upper arm pain complaints.  The following significant findings have been identified: Pain, Decreased ROM/flexibility, Decreased joint mobility, Decreased strength, Impaired muscle performance, Decreased activity tolerance, and Impaired posture. These impairments interfere with their ability to perform self care tasks, recreational activities, and household chores as compared to previous level of function.     Clinical Decision Making (Complexity):  Clinical Presentation: Stable/Uncomplicated  Clinical Presentation Rationale: based on medical and personal factors listed in PT evaluation  Clinical Decision Making (Complexity): Low complexity    PLAN OF CARE  Treatment Interventions:  Modalities: Cryotherapy, E-stim, Hot Pack, Ultrasound  Interventions: Gait Training, Manual Therapy, Neuromuscular  Re-education, Therapeutic Activity, Therapeutic Exercise    Long Term Goals     PT Goal 1  Goal Identifier: LTG 1  Goal Description: Pt will report no waking due to shoulder pain overnight in order to improve restroative sleep.  Target Date: 10/23/24  PT Goal 2  Goal Identifier: LTG 2  Goal Description: Pt will be able to lift at least 5# away from body without increased pain in order to imrpove tolerance to daily activities.  Target Date: 10/23/24      Frequency of Treatment: 1x/week  Duration of Treatment: 8 weeks    Education Assessment:   Learner/Method: Patient;No Barriers to Learning    Risks and benefits of evaluation/treatment have been explained.   Patient/Family/caregiver agrees with Plan of Care.     Evaluation Time:     PT Eval, Low Complexity Minutes (06502): 15       Signing Clinician: Krissy Nevarez, PT        [facility]                                                                                   OUTPATIENT PHYSICAL THERAPY      PLAN OF TREATMENT FOR OUTPATIENT REHABILITATION   Patient's Last Name, First Name, Heather Montenegro YOB: 1950   Provider's Name   [facility]   Medical Record No.  [id number]     Onset Date: 06/05/24  Start of Care Date: 08/28/24     Medical Diagnosis:  Tendenopathy of Right biceps tendon      PT Treatment Diagnosis:  Tendenopathy of Right biceps tendon Plan of Treatment  Frequency/Duration: 1x/week/ 8 weeks    Certification date from 08/28/24 to 10/23/24         See note for plan of treatment details and functional goals     Krissy Nevarez, PT                         I CERTIFY THE NEED FOR THESE SERVICES FURNISHED UNDER        THIS PLAN OF TREATMENT AND WHILE UNDER MY CARE     (Physician attestation of this document indicates review and certification of the therapy plan).              Referring Provider:  Carrie Delgado    Initial Assessment  See Epic Evaluation- Start of  Care Date: 08/28/24

## 2024-10-02 ENCOUNTER — LAB (OUTPATIENT)
Dept: LAB | Facility: CLINIC | Age: 74
End: 2024-10-02
Payer: COMMERCIAL

## 2024-10-02 DIAGNOSIS — E03.9 HYPOTHYROIDISM, UNSPECIFIED TYPE: ICD-10-CM

## 2024-10-02 LAB — TSH SERPL DL<=0.005 MIU/L-ACNC: 2.37 UIU/ML (ref 0.3–4.2)

## 2024-10-02 PROCEDURE — 84443 ASSAY THYROID STIM HORMONE: CPT

## 2024-10-02 PROCEDURE — 36415 COLL VENOUS BLD VENIPUNCTURE: CPT

## 2024-10-25 DIAGNOSIS — E03.9 HYPOTHYROIDISM, UNSPECIFIED TYPE: ICD-10-CM

## 2024-10-28 RX ORDER — LEVOTHYROXINE SODIUM 50 UG/1
50 TABLET ORAL DAILY
Qty: 90 TABLET | Refills: 2 | Status: SHIPPED | OUTPATIENT
Start: 2024-10-28

## 2025-02-13 SDOH — HEALTH STABILITY: PHYSICAL HEALTH: ON AVERAGE, HOW MANY MINUTES DO YOU ENGAGE IN EXERCISE AT THIS LEVEL?: 30 MIN

## 2025-02-13 SDOH — HEALTH STABILITY: PHYSICAL HEALTH: ON AVERAGE, HOW MANY DAYS PER WEEK DO YOU ENGAGE IN MODERATE TO STRENUOUS EXERCISE (LIKE A BRISK WALK)?: 5 DAYS

## 2025-02-13 ASSESSMENT — SOCIAL DETERMINANTS OF HEALTH (SDOH): HOW OFTEN DO YOU GET TOGETHER WITH FRIENDS OR RELATIVES?: ONCE A WEEK

## 2025-02-18 ENCOUNTER — OFFICE VISIT (OUTPATIENT)
Dept: FAMILY MEDICINE | Facility: CLINIC | Age: 75
End: 2025-02-18
Attending: FAMILY MEDICINE
Payer: COMMERCIAL

## 2025-02-18 VITALS
RESPIRATION RATE: 16 BRPM | DIASTOLIC BLOOD PRESSURE: 74 MMHG | BODY MASS INDEX: 38.34 KG/M2 | OXYGEN SATURATION: 96 % | SYSTOLIC BLOOD PRESSURE: 116 MMHG | HEART RATE: 80 BPM | HEIGHT: 63 IN | TEMPERATURE: 97.4 F | WEIGHT: 216.4 LBS

## 2025-02-18 DIAGNOSIS — E78.5 HYPERLIPIDEMIA, UNSPECIFIED HYPERLIPIDEMIA TYPE: ICD-10-CM

## 2025-02-18 DIAGNOSIS — I50.9 CONGESTIVE HEART FAILURE, UNSPECIFIED HF CHRONICITY, UNSPECIFIED HEART FAILURE TYPE (H): ICD-10-CM

## 2025-02-18 DIAGNOSIS — K21.9 GASTROESOPHAGEAL REFLUX DISEASE, UNSPECIFIED WHETHER ESOPHAGITIS PRESENT: ICD-10-CM

## 2025-02-18 DIAGNOSIS — E03.9 HYPOTHYROIDISM, UNSPECIFIED TYPE: ICD-10-CM

## 2025-02-18 DIAGNOSIS — I42.9 CARDIOMYOPATHY, UNSPECIFIED TYPE (H): ICD-10-CM

## 2025-02-18 DIAGNOSIS — E66.01 MORBID OBESITY (H): ICD-10-CM

## 2025-02-18 DIAGNOSIS — Z78.0 POST-MENOPAUSAL: ICD-10-CM

## 2025-02-18 DIAGNOSIS — Z00.00 ENCOUNTER FOR MEDICARE ANNUAL WELLNESS EXAM: Primary | ICD-10-CM

## 2025-02-18 DIAGNOSIS — E55.9 VITAMIN D DEFICIENCY: ICD-10-CM

## 2025-02-18 LAB
ALT SERPL W P-5'-P-CCNC: 17 U/L (ref 0–50)
ANION GAP SERPL CALCULATED.3IONS-SCNC: 12 MMOL/L (ref 7–15)
BUN SERPL-MCNC: 15.6 MG/DL (ref 8–23)
CALCIUM SERPL-MCNC: 9.4 MG/DL (ref 8.8–10.4)
CHLORIDE SERPL-SCNC: 102 MMOL/L (ref 98–107)
CHOLEST SERPL-MCNC: 194 MG/DL
CREAT SERPL-MCNC: 0.83 MG/DL (ref 0.51–0.95)
EGFRCR SERPLBLD CKD-EPI 2021: 74 ML/MIN/1.73M2
FASTING STATUS PATIENT QL REPORTED: YES
FASTING STATUS PATIENT QL REPORTED: YES
GLUCOSE SERPL-MCNC: 96 MG/DL (ref 70–99)
HCO3 SERPL-SCNC: 27 MMOL/L (ref 22–29)
HDLC SERPL-MCNC: 68 MG/DL
LDLC SERPL CALC-MCNC: 104 MG/DL
NONHDLC SERPL-MCNC: 126 MG/DL
POTASSIUM SERPL-SCNC: 4.7 MMOL/L (ref 3.4–5.3)
SODIUM SERPL-SCNC: 141 MMOL/L (ref 135–145)
TRIGL SERPL-MCNC: 109 MG/DL
TSH SERPL DL<=0.005 MIU/L-ACNC: 1.98 UIU/ML (ref 0.3–4.2)
VIT D+METAB SERPL-MCNC: 70 NG/ML (ref 20–50)

## 2025-02-18 PROCEDURE — 82306 VITAMIN D 25 HYDROXY: CPT | Performed by: FAMILY MEDICINE

## 2025-02-18 PROCEDURE — 84460 ALANINE AMINO (ALT) (SGPT): CPT | Performed by: FAMILY MEDICINE

## 2025-02-18 PROCEDURE — 80061 LIPID PANEL: CPT | Performed by: FAMILY MEDICINE

## 2025-02-18 PROCEDURE — 36415 COLL VENOUS BLD VENIPUNCTURE: CPT | Performed by: FAMILY MEDICINE

## 2025-02-18 PROCEDURE — 80048 BASIC METABOLIC PNL TOTAL CA: CPT | Performed by: FAMILY MEDICINE

## 2025-02-18 PROCEDURE — 84443 ASSAY THYROID STIM HORMONE: CPT | Performed by: FAMILY MEDICINE

## 2025-02-18 RX ORDER — OMEPRAZOLE 20 MG/1
20 CAPSULE, DELAYED RELEASE ORAL DAILY
Qty: 30 CAPSULE | Refills: 5 | Status: SHIPPED | OUTPATIENT
Start: 2025-02-18

## 2025-02-18 RX ORDER — ROSUVASTATIN CALCIUM 5 MG/1
5 TABLET, COATED ORAL DAILY
Qty: 90 TABLET | Refills: 3 | Status: SHIPPED | OUTPATIENT
Start: 2025-02-18

## 2025-02-18 RX ORDER — LISINOPRIL 2.5 MG/1
2.5 TABLET ORAL DAILY
Qty: 90 TABLET | Refills: 3 | Status: SHIPPED | OUTPATIENT
Start: 2025-02-18

## 2025-02-18 RX ORDER — CARVEDILOL 3.12 MG/1
TABLET ORAL
Qty: 90 TABLET | Refills: 1 | Status: SHIPPED | OUTPATIENT
Start: 2025-02-18

## 2025-02-18 RX ORDER — LEVOTHYROXINE SODIUM 50 UG/1
50 TABLET ORAL DAILY
Qty: 90 TABLET | Refills: 2 | Status: SHIPPED | OUTPATIENT
Start: 2025-02-18

## 2025-02-18 ASSESSMENT — PAIN SCALES - GENERAL: PAINLEVEL_OUTOF10: NO PAIN (0)

## 2025-02-18 NOTE — PROGRESS NOTES
"Preventive Care Visit  [facility] MAGY Delgado MD, Family Medicine  Feb 18, 2025      Assessment & Plan     Encounter for Medicare annual wellness exam    Hyperlipidemia, unspecified hyperlipidemia type  - ALT; Future  - rosuvastatin (CRESTOR) 5 MG tablet; Take 1 tablet (5 mg) by mouth daily.  - Lipid panel reflex to direct LDL Fasting; Future  - ALT  - Lipid panel reflex to direct LDL Fasting    Congestive heart failure, unspecified HF chronicity, unspecified heart failure type (H)  Stable - no current symptoms  - BASIC METABOLIC PANEL; Future  - lisinopril (ZESTRIL) 2.5 MG tablet; Take 1 tablet (2.5 mg) by mouth daily.  - BASIC METABOLIC PANEL    Cardiomyopathy, unspecified type (H)  Stable - following with cardiology  - carvedilol (COREG) 3.125 MG tablet; TAKE 1 TABLET BY MOUTH EVERY DAY    Hypothyroidism, unspecified type  recheck  - levothyroxine (SYNTHROID/LEVOTHROID) 50 MCG tablet; Take 1 tablet (50 mcg) by mouth daily.  - TSH with free T4 reflex; Future  - TSH with free T4 reflex    Gastroesophageal reflux disease, unspecified whether esophagitis present  Refill - was using the 40mg occasionally - OK decreasing to 20mg tabs  - omeprazole (PRILOSEC) 20 MG DR capsule; Take 1 capsule (20 mg) by mouth daily.    Vitamin D deficiency  - Vitamin D Deficiency; Future  - Vitamin D Deficiency    Post-menopausal  - DX Bone Density; Future    Morbid obesity (H)  Walking several times weekly    Carpal tunnel of left wrist  Information physical therapy exercises given    Patient has been advised of split billing requirements and indicates understanding: Yes        BMI  Estimated body mass index is 38.03 kg/m  as calculated from the following:    Height as of this encounter: 1.607 m (5' 3.25\").    Weight as of this encounter: 98.2 kg (216 lb 6.4 oz).   Weight management plan: Discussed healthy diet and exercise guidelines    Counseling  Appropriate preventive services were addressed with this " patient via screening, questionnaire, or discussion as appropriate for fall prevention, nutrition, physical activity, Tobacco-use cessation, social engagement, weight loss and cognition.  Checklist reviewing preventive services available has been given to the patient.  Reviewed patient's diet, addressing concerns and/or questions.   The patient was instructed to see the dentist every 6 months.   The patient was provided with written information regarding signs of hearing loss.           Yfn Romero is a 75 year old, presenting for the following:  Medicare Visit        2/18/2025     8:12 AM   Additional Questions   Roomed by Brianne Bailey CMA   Accompanied by Self           HPI    -Her left hand keeps going numb and she is wanting to discuss this.    Left thumb and pointer finger sometimes will go numb after typing for a while or sometimes when she wakes up in the morning.        Health Care Directive  Patient does not have a Health Care Directive: Patient states has Advance Directive and will bring in a copy to clinic.        2/13/2025   General Health   How would you rate your overall physical health? Good   Feel stress (tense, anxious, or unable to sleep) Not at all         2/13/2025   Nutrition   Diet: Regular (no restrictions)         2/13/2025   Exercise   Days per week of moderate/strenous exercise 5 days   Average minutes spent exercising at this level 30 min         2/13/2025   Social Factors   Frequency of gathering with friends or relatives Once a week   Worry food won't last until get money to buy more No   Food not last or not have enough money for food? No   Do you have housing? (Housing is defined as stable permanent housing and does not include staying ouside in a car, in a tent, in an abandoned building, in an overnight shelter, or couch-surfing.) Yes   Are you worried about losing your housing? No   Lack of transportation? No   Unable to get utilities (heat,electricity)? No         2/13/2025    Fall Risk   Fallen 2 or more times in the past year? No    No   Trouble with walking or balance? No    No       Multiple values from one day are sorted in reverse-chronological order          2025   Activities of Daily Living- Home Safety   Needs help with the following daily activites None of the above   Safety concerns in the home None of the above         2025   Dental   Dentist two times every year? (!) NO         2025   Hearing Screening   Hearing concerns? (!) I NEED TO ASK PEOPLE TO SPEAK UP OR REPEAT THEMSELVES.    (!) IT'S HARD TO FOLLOW A CONVERSATION IN A NOISY RESTAURANT OR CROWDED ROOM.       Multiple values from one day are sorted in reverse-chronological order         2025   Driving Risk Screening   Patient/family members have concerns about driving No         2025   General Alertness/Fatigue Screening   Have you been more tired than usual lately? No         2025   Urinary Incontinence Screening   Bothered by leaking urine in past 6 months No         2024   TB Screening   Were you born outside of the [place]? No         Today's PHQ-2 Score:       2025     8:04 AM   PHQ-2 ( 1999 Pf[employer])   Q1: Little interest or pleasure in doing things 0   Q2: Feeling down, depressed or hopeless 0   PHQ-2 Score 0    Q1: Little interest or pleasure in doing things Not at all   Q2: Feeling down, depressed or hopeless Not at all   PHQ-2 Score 0       Patient-reported           2025   Substance Use   Alcohol more than 3/day or more than 7/wk No   Do you have a current opioid prescription? No   How severe/bad is pain from 1 to 10? 2/10   Do you use any other substances recreationally? No     Social History     Tobacco Use    Smoking status: Former     Current packs/day: 0.00     Types: Cigarettes     Quit date:      Years since quittin.1    Smokeless tobacco: Never   Vaping Use    Vaping status: Never Used   Substance Use Topics    Alcohol use: No    Drug use: No            "7/25/2024   LAST FHS-7 RESULTS   1st degree relative breast or ovarian cancer No   Any relative bilateral breast cancer No   Any male have breast cancer No   Any ONE woman have BOTH breast AND ovarian cancer No   Any woman with breast cancer before 50yrs No   2 or more relatives with breast AND/OR ovarian cancer No   2 or more relatives with breast AND/OR bowel cancer No        Mammogram Screening - Mammogram every 1-2 years updated in Health Maintenance based on mutual decision making    ASCVD Risk   The 10-year ASCVD risk score (Olivia VENEGAS, et al., 2019) is: 8.9%    Values used to calculate the score:      Age: 75 years      Sex: Female      Is Non- : No      Diabetic: No      Tobacco smoker: No      Systolic Blood Pressure: 98 mmHg      Is BP treated: No      HDL Cholesterol: 65 mg/dL      Total Cholesterol: 216 mg/dL            Reviewed and updated as needed this visit by Provider                    Past Medical History:   Diagnosis Date    Arthritis 1980    Blood glucose elevated     Cardiomyopathy (H)     Congestive heart failure (H)     Gastroesophageal reflux disease     Hyperlipidemia     LBBB (left bundle branch block)     Palpitations     PONV (postoperative nausea and vomiting)     Sleep apnea      Past Surgical History:   Procedure Laterality Date    ESOPHAGOSCOPY, GASTROSCOPY, DUODENOSCOPY (EGD), COMBINED N/A 9/22/2023    Procedure: ESOPHAGOGASTRODUODENOSCOPY WITH BIOPSY;  Surgeon: David Sorto MD;  Location: [facility]    EYE SURGERY  Nov/Dec 2019 and May 2020    Cataracts and eye lift    HC REMOVAL GALLBLADDER      Description: Cholecystectomy;  Proc Date: 01/01/2000;    ORTHOPEDIC SURGERY  06/2007    knee replacement    AR ARTHROPLASTY TIBIAL PLATEAU      Description: Knee Replacement;  Recorded: 06/25/2013;  Comments: 1969 = \"reconstruction\" per pt    ZZC LIGATE FALLOPIAN TUBE      Description: Tubal Ligation;  Recorded: 06/25/2013;     Current providers sharing in " "care for this patient include:  Patient Care Team:  Carrie Delgado MD as PCP - General  Carrie Delgado MD as Assigned PCP  Sanjuana Baird PA-C as Physician Assistant (Gastroenterology)  Sanjuana Baird PA-C as Assigned Gastroenterology Provider    The following health maintenance items are reviewed in Epic and correct as of today:  Health Maintenance   Topic Date Due    LUNG CANCER SCREENING  Never done    DTAP/TDAP/TD IMMUNIZATION (2 - Td or Tdap) 08/23/2020    HF ACTION PLAN  08/31/2024    ALT  02/07/2025    LIPID  02/07/2025    BMP  02/09/2025    ANNUAL REVIEW OF HM ORDERS  08/09/2025    COVID-19 Vaccine (9 - 2024-25 season) 03/19/2025    CBC  08/09/2025    COLORECTAL CANCER SCREENING  08/16/2025    MEDICARE ANNUAL WELLNESS VISIT  02/18/2026    FALL RISK ASSESSMENT  02/18/2026    MAMMO SCREENING  07/25/2026    GLUCOSE  08/09/2027    ADVANCE CARE PLANNING  02/18/2030    DEXA  02/07/2038    TSH W/FREE T4 REFLEX  Completed    PHQ-2 (once per calendar year)  Completed    INFLUENZA VACCINE  Completed    Pneumococcal Vaccine: 50+ Years  Completed    ZOSTER IMMUNIZATION  Completed    RSV VACCINE  Completed    HPV IMMUNIZATION  Aged Out    MENINGITIS IMMUNIZATION  Aged Out    HEPATITIS C SCREENING  Discontinued         Review of Systems  Constitutional, HEENT, cardiovascular, pulmonary, GI, , musculoskeletal, neuro, skin, endocrine and psych systems are negative, except as otherwise noted.     Objective    Exam  Ht 1.607 m (5' 3.25\")   Wt 98.2 kg (216 lb 6.4 oz)   BMI 38.03 kg/m     Estimated body mass index is 38.03 kg/m  as calculated from the following:    Height as of this encounter: 1.607 m (5' 3.25\").    Weight as of this encounter: 98.2 kg (216 lb 6.4 oz).    Physical Exam  Objective:  Vital signs reviewed and stable  General: No acute distress  Psych: Appropriate affect  HEENT: moist mucous membranes, pupils equal, round, reactive to light and accomodation, tympanic membranes are pearly grey " bilaterally  Lymph: no cervical or supraclavicular lymphadenopathy  Cardiovascular: regular rate and rhythm with no murmur  Pulmonary: clear to auscultation bilaterally with no wheeze  Abdomen: soft, non tender, non distended with normo-active bowel sounds  Extremities: warm and well perfused with no edema  Skin: warm and dry with no rash           2/18/2025   Mini Cog   Clock Draw Score 2 Normal   3 Item Recall 3 objects recalled   Mini Cog Total Score 5              Signed Electronically by: Carrie Delgado MD

## 2025-02-18 NOTE — PATIENT INSTRUCTIONS
Patient Education   Preventive Care Advice   This is general advice given by our system to help you stay healthy. However, your care team may have specific advice just for you. Please talk to your care team about your preventive care needs.  Nutrition  Eat 5 or more servings of fruits and vegetables each day.  Try wheat bread, brown rice and whole grain pasta (instead of white bread, rice, and pasta).  Get enough calcium and vitamin D. Check the label on foods and aim for 100% of the RDA (recommended daily allowance).  Lifestyle  Exercise at least 150 minutes each week  (30 minutes a day, 5 days a week).  Do muscle strengthening activities 2 days a week. These help control your weight and prevent disease.  No smoking.  Wear sunscreen to prevent skin cancer.  Have a dental exam and cleaning every 6 months.  Yearly exams  See your health care team every year to talk about:  Any changes in your health.  Any medicines your care team has prescribed.  Preventive care, family planning, and ways to prevent chronic diseases.  Shots (vaccines)   HPV shots (up to age 26), if you've never had them before.  Hepatitis B shots (up to age 59), if you've never had them before.  COVID-19 shot: Get this shot when it's due.  Flu shot: Get a flu shot every year.  Tetanus shot: Get a tetanus shot every 10 years.  Pneumococcal, hepatitis A, and RSV shots: Ask your care team if you need these based on your risk.  Shingles shot (for age 50 and up)  General health tests  Diabetes screening:  Starting at age 35, Get screened for diabetes at least every 3 years.  If you are younger than age 35, ask your care team if you should be screened for diabetes.  Cholesterol test: At age 39, start having a cholesterol test every 5 years, or more often if advised.  Bone density scan (DEXA): At age 50, ask your care team if you should have this scan for osteoporosis (brittle bones).  Hepatitis C: Get tested at least once in your life.  STIs (sexually  transmitted infections)  Before age 24: Ask your care team if you should be screened for STIs.  After age 24: Get screened for STIs if you're at risk. You are at risk for STIs (including HIV) if:  You are sexually active with more than one person.  You don't use condoms every time.  You or a partner was diagnosed with a sexually transmitted infection.  If you are at risk for HIV, ask about PrEP medicine to prevent HIV.  Get tested for HIV at least once in your life, whether you are at risk for HIV or not.  Cancer screening tests  Cervical cancer screening: If you have a cervix, begin getting regular cervical cancer screening tests starting at age 21.  Breast cancer scan (mammogram): If you've ever had breasts, begin having regular mammograms starting at age 40. This is a scan to check for breast cancer.  Colon cancer screening: It is important to start screening for colon cancer at age 45.  Have a colonoscopy test every 10 years (or more often if you're at risk) Or, ask your provider about stool tests like a FIT test every year or Cologuard test every 3 years.  To learn more about your testing options, visit:   .  For help making a decision, visit:   https://bit.ly/kx03672.  Prostate cancer screening test: If you have a prostate, ask your care team if a prostate cancer screening test (PSA) at age 55 is right for you.  Lung cancer screening: If you are a current or former smoker ages 50 to 80, ask your care team if ongoing lung cancer screenings are right for you.  For informational purposes only. Not to replace the advice of your health care provider. Copyright   2023 [facility] [employer]. All rights reserved. Clinically reviewed by the [facility] Transitions Program. Explore.To Yellow Pages 550952 - REV 01/24.  Hearing Loss: Care Instructions  Overview     Hearing loss is a sudden or slow decrease in how well you hear. It can range from slight to profound. Permanent hearing loss can occur with aging. It also can  happen when you are exposed long-term to loud noise. Examples include listening to loud music, riding motorcycles, or being around other loud machines.  Hearing loss can affect your work and home life. It can make you feel lonely or depressed. You may feel that you have lost your independence. But hearing aids and other devices can help you hear better and feel connected to others.  Follow-up care is a key part of your treatment and safety. Be sure to make and go to all appointments, and call your doctor if you are having problems. It's also a good idea to know your test results and keep a list of the medicines you take.  How can you care for yourself at home?  Avoid loud noises whenever possible. This helps keep your hearing from getting worse.  Always wear hearing protection around loud noises.  Wear a hearing aid as directed.  A professional can help you pick a hearing aid that will work best for you.  You can also get hearing aids over the counter for mild to moderate hearing loss.  Have hearing tests as your doctor suggests. They can show whether your hearing has changed. Your hearing aid may need to be adjusted.  Use other devices as needed. These may include:  Telephone amplifiers and hearing aids that can connect to a television, stereo, radio, or microphone.  Devices that use lights or vibrations. These alert you to the doorbell, a ringing telephone, or a baby monitor.  Television closed-captioning. This shows the words at the bottom of the screen. Most new TVs can do this.  TTY (text telephone). This lets you type messages back and forth on the telephone instead of talking or listening. These devices are also called TDD. When messages are typed on the keyboard, they are sent over the phone line to a receiving TTY. The message is shown on a monitor.  Use text messaging, social media, and email if it is hard for you to communicate by telephone.  Try to learn a listening technique called speechreading. It is  "not lipreading. You pay attention to people's gestures, expressions, posture, and tone of voice. These clues can help you understand what a person is saying. Face the person you are talking to, and have them face you. Make sure the lighting is good. You need to see the other person's face clearly.  Think about counseling if you need help to adjust to your hearing loss.  When should you call for help?  Watch closely for changes in your health, and be sure to contact your doctor if:    You think your hearing is getting worse.     You have new symptoms, such as dizziness or nausea.   Where can you learn more?  Go to https://www.[employer].net/patiented  Enter R798 in the search box to learn more about \"Hearing Loss: Care Instructions.\"  Current as of: September 27, 2023  Content Version: 14.3    2024 [email or web address].   Care instructions adapted under license by your healthcare professional. If you have questions about a medical condition or this instruction, always ask your healthcare professional. [email or web address] disclaims any warranty or liability for your use of this information.       "

## 2025-04-09 ENCOUNTER — HOSPITAL ENCOUNTER (OUTPATIENT)
Dept: BONE DENSITY | Facility: CLINIC | Age: 75
Discharge: HOME | End: 2025-04-09
Attending: FAMILY MEDICINE
Payer: COMMERCIAL

## 2025-04-09 DIAGNOSIS — Z78.0 POST-MENOPAUSAL: ICD-10-CM

## 2025-04-09 PROCEDURE — 77080 DXA BONE DENSITY AXIAL: CPT

## 2025-08-06 ENCOUNTER — PATIENT OUTREACH (OUTPATIENT)
Dept: CARE COORDINATION | Facility: CLINIC | Age: 75
End: 2025-08-06
Payer: COMMERCIAL

## 2025-08-07 DIAGNOSIS — Z12.11 COLON CANCER SCREENING: ICD-10-CM

## 2025-08-12 ENCOUNTER — HOSPITAL ENCOUNTER (OUTPATIENT)
Dept: MAMMOGRAPHY | Facility: CLINIC | Age: 75
Discharge: HOME | End: 2025-08-12
Attending: FAMILY MEDICINE
Payer: COMMERCIAL

## 2025-08-12 DIAGNOSIS — Z12.31 VISIT FOR SCREENING MAMMOGRAM: ICD-10-CM

## 2025-08-12 DIAGNOSIS — I42.9 CARDIOMYOPATHY, UNSPECIFIED TYPE (H): ICD-10-CM

## 2025-08-12 PROCEDURE — 77067 SCR MAMMO BI INCL CAD: CPT

## 2025-08-12 RX ORDER — CARVEDILOL 3.12 MG/1
3.12 TABLET ORAL DAILY
Qty: 90 TABLET | Refills: 1 | Status: SHIPPED | OUTPATIENT
Start: 2025-08-12

## 2025-08-14 ENCOUNTER — ANCILLARY PROCEDURE (OUTPATIENT)
Dept: MAMMOGRAPHY | Facility: CLINIC | Age: 75
End: 2025-08-14
Attending: FAMILY MEDICINE
Payer: COMMERCIAL

## 2025-08-14 DIAGNOSIS — R92.8 ABNORMAL MAMMOGRAM: ICD-10-CM

## 2025-08-14 PROCEDURE — 77065 DX MAMMO INCL CAD UNI: CPT | Mod: LT

## 2025-08-14 PROCEDURE — 76642 ULTRASOUND BREAST LIMITED: CPT | Mod: LT

## 2025-08-21 ENCOUNTER — ORDERS ONLY (AUTO-RELEASED) (OUTPATIENT)
Dept: URGENT CARE | Facility: CLINIC | Age: 75
End: 2025-08-21
Payer: COMMERCIAL

## 2025-08-21 DIAGNOSIS — Z12.11 COLON CANCER SCREENING: ICD-10-CM

## (undated) DEVICE — GOWN IMPERVIOUS 2XL BLUE

## (undated) DEVICE — KIT ENDO TURNOVER/PROCEDURE CARRY-ON 101822

## (undated) DEVICE — SOL WATER IRRIG 500ML BOTTLE 2F7113

## (undated) DEVICE — SYR 30ML SLIP TIP W/O NDL 302833

## (undated) DEVICE — TUBING SUCTION MEDI-VAC 1/4"X20' N620A

## (undated) DEVICE — GLOVE EXAM NITRILE LG PF LATEX FREE 5064

## (undated) DEVICE — SUCTION MANIFOLD NEPTUNE 2 SYS 1 PORT 702-025-000

## (undated) DEVICE — SUCTION CATH AIRLIFE TRI-FLO W/CONTROL PORT 14FR  T60C

## (undated) DEVICE — ENDO BITE BLOCK ADULT OMNI-BLOC